# Patient Record
Sex: MALE | Race: OTHER | HISPANIC OR LATINO | URBAN - METROPOLITAN AREA
[De-identification: names, ages, dates, MRNs, and addresses within clinical notes are randomized per-mention and may not be internally consistent; named-entity substitution may affect disease eponyms.]

---

## 2019-05-22 ENCOUNTER — EMERGENCY (EMERGENCY)
Facility: HOSPITAL | Age: 49
LOS: 1 days | Discharge: HOME | End: 2019-05-22
Admitting: EMERGENCY MEDICINE
Payer: MEDICAID

## 2019-05-22 VITALS
WEIGHT: 223.11 LBS | OXYGEN SATURATION: 99 % | TEMPERATURE: 98 F | HEART RATE: 78 BPM | SYSTOLIC BLOOD PRESSURE: 109 MMHG | RESPIRATION RATE: 16 BRPM | DIASTOLIC BLOOD PRESSURE: 77 MMHG

## 2019-05-22 PROCEDURE — 99282 EMERGENCY DEPT VISIT SF MDM: CPT

## 2019-05-22 RX ORDER — BACITRACIN ZINC 500 UNIT/G
1 OINTMENT IN PACKET (EA) TOPICAL ONCE
Refills: 0 | Status: COMPLETED | OUTPATIENT
Start: 2019-05-22 | End: 2019-05-22

## 2019-05-22 RX ADMIN — Medication 1 APPLICATION(S): at 14:40

## 2019-05-22 NOTE — ED PROVIDER NOTE - NSFOLLOWUPINSTRUCTIONS_ED_ALL_ED_FT
please keep the wound care    please place bacitracin on the wound 2 times a day for the next week    please follow up with your primary care doctor    Acute Wounds    WHAT YOU NEED TO KNOW:    An acute wound is an injury that causes a break in the skin. As your wound begins to heal, it is normal to have some swelling, pain, and redness. Your body's immune system is working to keep your wound from getting infected. Your wound may develop a scab. The scab protects your wound as it heals.     DISCHARGE INSTRUCTIONS:    Call 911 for the following:     You suddenly have trouble breathing or have chest pain.        Return to the emergency department if:     Blood soaks through your bandage.      You have pus or a foul odor coming from the wound.      Your stitches come apart or your wound reopens.    Contact your healthcare provider if:     You continue to have pain even after you have taken pain medicine.      You have muscle, joint, or body aches, sweating, or a fever.      You have increased swelling, redness, or bleeding in your wound.      Your skin is itchy, swollen, or you have a rash.      You have questions or concerns about your condition or care.    Medicines: You may need any of the following:     Antibiotics may be given to prevent or treat an infection.       Prescription pain medicine may be given. Ask your how to take this medicine safely.      NSAIDs help decrease swelling and pain or fever. This medicine is available with or without a doctor's order. NSAIDs can cause stomach bleeding or kidney problems in certain people. If you take blood thinner medicine, always ask your healthcare provider if NSAIDs are safe for you. Always read the medicine label and follow directions.      Take your medicine as directed. Contact your healthcare provider if you think your medicine is not helping or if you have side effects. Tell him or her if you are allergic to any medicine. Keep a list of the medicines, vitamins, and herbs you take. Include the amounts, and when and why you take them. Bring the list or the pill bottles to follow-up visits. Carry your medicine list with you in case of an emergency.    Care for your wound as directed: Acute wounds can be in different locations and caused by different injuries. Follow your healthcare provider's instructions on caring for your type of wound. The following care items are for most wounds:    Keep your wound covered with a clean and dry bandage. Change your bandage if it becomes wet or dirty. This will decrease the risk for infection in your wound. Follow your healthcare provider's instructions for changing your dressing.       Do not soak in a tub or swim until your healthcare provider says it is okay. Your wound may open if you get it too wet. Dirt from the water can also get into your wound and cause an infection.      Keep pets away from your wound. Pets carry germs that can cause a wound infection.       Do not pick or scratch scabs. Let scabs fall off on their own. You may damage new skin that is forming under the scab. You may have a worse scar after the damage.      Eat healthy foods and drink liquids as directed. Healthy foods give your body the nutrients it needs to heal your wound. Liquids prevent dehydration that can decrease the blood supply to your wound. Healthy foods include fruits, vegetables, grains (breads and cereals), dairy, and protein foods. Protein foods include meat, fish, nuts, and soy products. Protein, calories, vitamin C, and zinc help wounds heal. Ask your healthcare provider for more information about the foods you should eat to improve healing.     Follow up with your healthcare provider as directed: Write down your questions so you remember to ask them during your visits.        © Copyright [email or web address] 2019 All illustrations and images included in CareNotes are the copyrighted property of [employer] or [employer].      back to top                      © Copyright [email or web address] 2019

## 2019-05-22 NOTE — ED PROVIDER NOTE - PHYSICAL EXAMINATION
General survey: Patient is well developed and well nourished. Patient is lying in stretcher, not diaphoretic and does not appear in acute distress.    Pulm: Breath sounds present throughout all lung fields. Chest expansion symmetric bilaterally. No evidence of wheezes, rales, rhonchi or retraction.    Skin: left distal tibia 2cm horizontal wound, no drainage. surrouding erythenam, no ttp. Warm dry and intact. No note of any edema, pallor, jaundice, erythema, ecchymosis, or purpura    Psych: Mood and affect appropriate

## 2019-05-22 NOTE — ED PROVIDER NOTE - OBJECTIVE STATEMENT
states that he has been scratching his left lower leg over the past 2 weeks. states that there is a small wound. no fevers or chills nausea. states there is pain in the area of the cut. states that he cut happened 10 days ago. unsure of how. states that he is concerned for infection given history of diabetes. no history of mrsa.

## 2019-05-22 NOTE — ED ADULT NURSE NOTE - OBJECTIVE STATEMENT
Pt coming from home due to "wound" to left shin. pt denies any drainage, or fever. 1.5 cm Healing lac with mild redness to area

## 2019-05-26 DIAGNOSIS — Z79.2 LONG TERM (CURRENT) USE OF ANTIBIOTICS: ICD-10-CM

## 2019-05-26 DIAGNOSIS — S81.802A UNSPECIFIED OPEN WOUND, LEFT LOWER LEG, INITIAL ENCOUNTER: ICD-10-CM
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
## 2019-05-26 DIAGNOSIS — Z79.899 OTHER LONG TERM (CURRENT) DRUG THERAPY: ICD-10-CM
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
## 2019-06-15 ENCOUNTER — EMERGENCY (EMERGENCY)
Facility: HOSPITAL | Age: 49
LOS: 1 days | Discharge: HOME | End: 2019-06-15
Attending: EMERGENCY MEDICINE | Admitting: EMERGENCY MEDICINE
Payer: MEDICAID

## 2019-06-15 VITALS
TEMPERATURE: 98 F | HEART RATE: 76 BPM | RESPIRATION RATE: 18 BRPM | DIASTOLIC BLOOD PRESSURE: 82 MMHG | SYSTOLIC BLOOD PRESSURE: 120 MMHG | OXYGEN SATURATION: 97 %

## 2019-06-15 VITALS
WEIGHT: 210.98 LBS | DIASTOLIC BLOOD PRESSURE: 87 MMHG | RESPIRATION RATE: 17 BRPM | HEART RATE: 78 BPM | SYSTOLIC BLOOD PRESSURE: 133 MMHG | TEMPERATURE: 98 F | OXYGEN SATURATION: 98 %

## 2019-06-15 PROBLEM — E78.5 HYPERLIPIDEMIA, UNSPECIFIED: Chronic | Status: ACTIVE | Noted: 2019-05-22

## 2019-06-15 PROBLEM — I10 ESSENTIAL (PRIMARY) HYPERTENSION: Chronic | Status: ACTIVE | Noted: 2019-05-22

## 2019-06-15 PROCEDURE — 99283 EMERGENCY DEPT VISIT LOW MDM: CPT

## 2019-06-15 RX ORDER — IBUPROFEN 200 MG
600 TABLET ORAL ONCE
Refills: 0 | Status: COMPLETED | OUTPATIENT
Start: 2019-06-15 | End: 2019-06-15

## 2019-06-15 RX ADMIN — Medication 600 MILLIGRAM(S): at 12:18

## 2019-06-15 NOTE — ED PROVIDER NOTE - CLINICAL SUMMARY MEDICAL DECISION MAKING FREE TEXT BOX
49 yo M with pmh of asthma, seizures and DM c/o L leg swelling x 1 month with darkening of this skin. Denies fever, chills, cp, sob, trauma. Pt seen in ED on 5/22 given bacitracin. +smoking. Denies recent travel. No hx of VTE. LLE +hyperpigmentation of skin around ankle and shin with scaling, no erythema, no warmth, nontender, no calf swelling, DP pulse 2+. Likely stasis dermatitis, low suspicion for DVT, however given second visit and smoker sono ordered. PT griffiths not want to wait, discussed risks and pt will f/u as an outpatient.

## 2019-06-15 NOTE — ED ADULT NURSE NOTE - OBJECTIVE STATEMENT
patient alert and oriented x 3 came c/o left lower leg pain with patient alert and oriented x 3 came c/o left lower leg pain with swelling for 1 mos, denies any injury , no numbness nor tingling sensation . Seen and examined by TATY Avalos , awaiting to go for ultrasound . Medicated for pain , will continue to monitor . patient alert and oriented x 3 came c/o left lower leg pain with swelling for 1 mos, denies any injury , no numbness nor tingling sensation . Seen and examined by TATY Avalos , awaiting to go for ultrasound . Medicated for pain , will continue to monitor .No son nor chest pain .

## 2019-06-15 NOTE — ED PROVIDER NOTE - NSFOLLOWUPINSTRUCTIONS_ED_ALL_ED_FT
Stasis Dermatitis    WHAT YOU NEED TO KNOW:    Stasis dermatitis is a condition that develops when blood pools in your lower legs. It is caused by poor blood flow back to your heart.    DISCHARGE INSTRUCTIONS:    Call 911 for any of the following:     You feel lightheaded, short of breath, and have chest pain.      You cough up blood.    Return to the emergency department if:     Your leg feels warm, tender, and painful. It may look swollen and red.        Contact your healthcare provider if:     You have a fever.      Your pain is not getting better, even with treatment.      You have new or worse open sores.      Your sores are draining pus.      Your movement is limited.      You have questions or concerns about your condition or care.    Medicines:     Medicines help improve blood flow from your legs to your heart and decrease swelling.      Take your medicine as directed. Contact your healthcare provider if you think your medicine is not helping or if you have side effects. Tell him of her if you are allergic to any medicine. Keep a list of the medicines, vitamins, and herbs you take. Include the amounts, and when and why you take them. Bring the list or the pill bottles to follow-up visits. Carry your medicine list with you in case of an emergency.    Manage your symptoms:     Wear pressure stockings. These tight elastic stockings put pressure on your legs. This improves blood flow and prevents blood from collecting in your legs.Pressure Stockings            Elevate your legs above the level of your heart for 30 minutes, 4 times a day. This will help decrease swelling and pain. Prop your legs on pillows or blankets to keep them elevated comfortably.      Apply unscented lotions or creams to help keep your skin moist and decrease itching.      Do not scratch your legs. Your skin can break open if you scratch. This can lead to sores or an infection.    Lifestyle changes:     Maintain a healthy weight. Ask your healthcare provider how much you should weigh. Ask him to help you create a weight loss plan if you are overweight. This will help improve your blood flow.      Eat a variety of healthy foods. Healthy foods include fruits, vegetables, whole-grain breads, low-fat dairy products, beans, lean meats, and fish. You may need to eat foods that are low in salt to help decrease swelling in your legs.      Exercise regularly. Ask your healthcare provider about the best exercise plan for you. Exercise improves the blood flow in your legs.    Follow up with your healthcare provider as directed: Write down your questions so you remember to ask them during your visits.

## 2019-06-15 NOTE — ED ADULT NURSE REASSESSMENT NOTE - NS ED NURSE REASSESS COMMENT FT1
Patient a/oX 3, no new pain or symptom complaint , vital signs stable, refused US, TATY Avalos spoke w/ patient and informed of risks of not getting US.  Discharged to home in stable condition.

## 2019-06-15 NOTE — ED PROVIDER NOTE - PHYSICAL EXAMINATION
CONSTITUTIONAL: Well-appearing; well-nourished; in no apparent distress.   HEAD: Normocephalic; atraumatic.   EYES: PERRL; EOM intact; conjunctiva and sclera clear  ENT: normal nose; no rhinorrhea; normal pharynx with no erythema or lesions.   NECK: Supple; non-tender; no LAD  CARDIOVASCULAR: Normal S1, S2; no murmurs, rubs, or gallops. Regular rate and rhythm.   RESPIRATORY: Breathing easily; breath sounds clear and equal bilaterally; no wheezes, rhonchi, or rales.  GI: Soft; non-distended; non-tender; no palpable organomegaly.   MSK: FROM at all extremities, normal tone   EXT: LLE +hyperpigmentation of skin around ankle and shin with scaling, no erythema, no warmth, nontender, no calf swelling, DP pulse 2+  SKIN: Normal for age and race; warm; dry; good turgor; no apparent lesions or rash.   NEURO: A & O x 3; face symmetric; grossly unremarkable.   PSYCHOLOGICAL: The patient’s mood and manner are appropriate.

## 2019-06-15 NOTE — ED ADULT NURSE REASSESSMENT NOTE - NS ED NURSE REASSESS COMMENT FT1
pt. refusing to go for sonogram , TATY Avalos made aware , spoke to the patient the risk of not going to sonogram for possible DVT , pt. still insisting to go home . Dr. [place] came and evaluate the patient .

## 2019-06-15 NOTE — ED ADULT NURSE NOTE - CHPI ED NUR SYMPTOMS NEG
no stiffness/no numbness/no difficulty bearing weight/no tingling/no fever/no bruising/no weakness/no abrasion/no deformity

## 2019-06-15 NOTE — ED PROVIDER NOTE - OBJECTIVE STATEMENT
47 yo M with pmh of asthma, seizures and DM c/o L leg swelling x 1 month with darkening of this skin. Denies fever, chills, cp, sob, trauma. Pt seen in ED on 5/22 given bacitracin. +smoking. Denies recent travel. No hx of VTE.

## 2019-06-15 NOTE — ED PROVIDER NOTE - CARE PROVIDER_API CALL
Mac Lee)  Surgery; Vascular Surgery  [address], [address] [place] [address]  Phone: [phone]  Fax: [phone]  Follow Up Time:

## 2019-06-15 NOTE — ED PROVIDER NOTE - ATTENDING CONTRIBUTION TO CARE
48M pmh DM, asthma, sz, DM p/w L leg swelling, darkening of skin. Offered US for r/o dvt, concern stasis dermatitis, pt seeknig discharge without further w/u. Has plan for outpt f/u. Not consistnet w/ cellulitis or sepsis. No bullae or e/o deep tissue infection.

## 2019-06-19 DIAGNOSIS — I87.2 VENOUS INSUFFICIENCY (CHRONIC) (PERIPHERAL): ICD-10-CM

## 2019-06-19 DIAGNOSIS — M79.89 OTHER SPECIFIED SOFT TISSUE DISORDERS: ICD-10-CM

## 2019-06-20 ENCOUNTER — EMERGENCY (EMERGENCY)
Facility: HOSPITAL | Age: 49
LOS: 1 days | Discharge: LEFT BEFORE DISCHARGE | End: 2019-06-20
Attending: EMERGENCY MEDICINE | Admitting: EMERGENCY MEDICINE
Payer: MEDICAID

## 2019-06-20 VITALS
SYSTOLIC BLOOD PRESSURE: 117 MMHG | OXYGEN SATURATION: 98 % | TEMPERATURE: 98 F | HEART RATE: 84 BPM | DIASTOLIC BLOOD PRESSURE: 78 MMHG | RESPIRATION RATE: 18 BRPM

## 2019-06-20 VITALS
SYSTOLIC BLOOD PRESSURE: 117 MMHG | HEART RATE: 68 BPM | RESPIRATION RATE: 16 BRPM | DIASTOLIC BLOOD PRESSURE: 83 MMHG | OXYGEN SATURATION: 99 %

## 2019-06-20 DIAGNOSIS — M79.89 OTHER SPECIFIED SOFT TISSUE DISORDERS: ICD-10-CM

## 2019-06-20 DIAGNOSIS — R07.89 OTHER CHEST PAIN: ICD-10-CM

## 2019-06-20 DIAGNOSIS — R06.02 SHORTNESS OF BREATH: ICD-10-CM

## 2019-06-20 LAB
ALBUMIN SERPL ELPH-MCNC: 4.4 G/DL — SIGNIFICANT CHANGE UP (ref 3.3–5)
ALP SERPL-CCNC: 78 U/L — SIGNIFICANT CHANGE UP (ref 40–120)
ALT FLD-CCNC: 19 U/L — SIGNIFICANT CHANGE UP (ref 10–45)
ANION GAP SERPL CALC-SCNC: 11 MMOL/L — SIGNIFICANT CHANGE UP (ref 5–17)
APPEARANCE UR: CLEAR — SIGNIFICANT CHANGE UP
AST SERPL-CCNC: 24 U/L — SIGNIFICANT CHANGE UP (ref 10–40)
BASOPHILS # BLD AUTO: 0.04 K/UL — SIGNIFICANT CHANGE UP (ref 0–0.2)
BASOPHILS NFR BLD AUTO: 0.4 % — SIGNIFICANT CHANGE UP (ref 0–2)
BILIRUB SERPL-MCNC: 0.3 MG/DL — SIGNIFICANT CHANGE UP (ref 0.2–1.2)
BILIRUB UR-MCNC: NEGATIVE — SIGNIFICANT CHANGE UP
BUN SERPL-MCNC: 11 MG/DL — SIGNIFICANT CHANGE UP (ref 7–23)
CALCIUM SERPL-MCNC: 9.8 MG/DL — SIGNIFICANT CHANGE UP (ref 8.4–10.5)
CHLORIDE SERPL-SCNC: 102 MMOL/L — SIGNIFICANT CHANGE UP (ref 96–108)
CO2 SERPL-SCNC: 29 MMOL/L — SIGNIFICANT CHANGE UP (ref 22–31)
COLOR SPEC: YELLOW — SIGNIFICANT CHANGE UP
CREAT SERPL-MCNC: 0.91 MG/DL — SIGNIFICANT CHANGE UP (ref 0.5–1.3)
D DIMER BLD IA.RAPID-MCNC: 311 NG/ML DDU — HIGH
DIFF PNL FLD: NEGATIVE — SIGNIFICANT CHANGE UP
EOSINOPHIL # BLD AUTO: 0.43 K/UL — SIGNIFICANT CHANGE UP (ref 0–0.5)
EOSINOPHIL NFR BLD AUTO: 4.4 % — SIGNIFICANT CHANGE UP (ref 0–6)
GLUCOSE SERPL-MCNC: 86 MG/DL — SIGNIFICANT CHANGE UP (ref 70–99)
GLUCOSE UR QL: NEGATIVE — SIGNIFICANT CHANGE UP
HCT VFR BLD CALC: 46.1 % — SIGNIFICANT CHANGE UP (ref 39–50)
HGB BLD-MCNC: 14.8 G/DL — SIGNIFICANT CHANGE UP (ref 13–17)
IMM GRANULOCYTES NFR BLD AUTO: 0.2 % — SIGNIFICANT CHANGE UP (ref 0–1.5)
KETONES UR-MCNC: NEGATIVE — SIGNIFICANT CHANGE UP
LEUKOCYTE ESTERASE UR-ACNC: NEGATIVE — SIGNIFICANT CHANGE UP
LYMPHOCYTES # BLD AUTO: 2.98 K/UL — SIGNIFICANT CHANGE UP (ref 1–3.3)
LYMPHOCYTES # BLD AUTO: 30.5 % — SIGNIFICANT CHANGE UP (ref 13–44)
MCHC RBC-ENTMCNC: 29 PG — SIGNIFICANT CHANGE UP (ref 27–34)
MCHC RBC-ENTMCNC: 32.1 GM/DL — SIGNIFICANT CHANGE UP (ref 32–36)
MCV RBC AUTO: 90.2 FL — SIGNIFICANT CHANGE UP (ref 80–100)
MONOCYTES # BLD AUTO: 0.72 K/UL — SIGNIFICANT CHANGE UP (ref 0–0.9)
MONOCYTES NFR BLD AUTO: 7.4 % — SIGNIFICANT CHANGE UP (ref 2–14)
NEUTROPHILS # BLD AUTO: 5.57 K/UL — SIGNIFICANT CHANGE UP (ref 1.8–7.4)
NEUTROPHILS NFR BLD AUTO: 57.1 % — SIGNIFICANT CHANGE UP (ref 43–77)
NITRITE UR-MCNC: NEGATIVE — SIGNIFICANT CHANGE UP
NRBC # BLD: 0 /100 WBCS — SIGNIFICANT CHANGE UP (ref 0–0)
PH UR: 6.5 — SIGNIFICANT CHANGE UP (ref 5–8)
PLATELET # BLD AUTO: 238 K/UL — SIGNIFICANT CHANGE UP (ref 150–400)
POTASSIUM SERPL-MCNC: 4.1 MMOL/L — SIGNIFICANT CHANGE UP (ref 3.5–5.3)
POTASSIUM SERPL-SCNC: 4.1 MMOL/L — SIGNIFICANT CHANGE UP (ref 3.5–5.3)
PROT SERPL-MCNC: 7.8 G/DL — SIGNIFICANT CHANGE UP (ref 6–8.3)
PROT UR-MCNC: NEGATIVE MG/DL — SIGNIFICANT CHANGE UP
RBC # BLD: 5.11 M/UL — SIGNIFICANT CHANGE UP (ref 4.2–5.8)
RBC # FLD: 14.4 % — SIGNIFICANT CHANGE UP (ref 10.3–14.5)
SODIUM SERPL-SCNC: 142 MMOL/L — SIGNIFICANT CHANGE UP (ref 135–145)
SP GR SPEC: <=1.005 — SIGNIFICANT CHANGE UP (ref 1–1.03)
TROPONIN T SERPL-MCNC: <0.01 NG/ML — SIGNIFICANT CHANGE UP (ref 0–0.01)
UROBILINOGEN FLD QL: 0.2 E.U./DL — SIGNIFICANT CHANGE UP
WBC # BLD: 9.76 K/UL — SIGNIFICANT CHANGE UP (ref 3.8–10.5)
WBC # FLD AUTO: 9.76 K/UL — SIGNIFICANT CHANGE UP (ref 3.8–10.5)

## 2019-06-20 PROCEDURE — 99285 EMERGENCY DEPT VISIT HI MDM: CPT

## 2019-06-20 PROCEDURE — 85379 FIBRIN DEGRADATION QUANT: CPT

## 2019-06-20 PROCEDURE — 71046 X-RAY EXAM CHEST 2 VIEWS: CPT

## 2019-06-20 PROCEDURE — 80053 COMPREHEN METABOLIC PANEL: CPT

## 2019-06-20 PROCEDURE — 36415 COLL VENOUS BLD VENIPUNCTURE: CPT

## 2019-06-20 PROCEDURE — 71046 X-RAY EXAM CHEST 2 VIEWS: CPT | Mod: 26

## 2019-06-20 PROCEDURE — 84484 ASSAY OF TROPONIN QUANT: CPT

## 2019-06-20 PROCEDURE — 99283 EMERGENCY DEPT VISIT LOW MDM: CPT | Mod: 25

## 2019-06-20 PROCEDURE — 81003 URINALYSIS AUTO W/O SCOPE: CPT

## 2019-06-20 PROCEDURE — 85025 COMPLETE CBC W/AUTO DIFF WBC: CPT

## 2019-06-20 NOTE — ED ADULT TRIAGE NOTE - CHIEF COMPLAINT QUOTE
CP and SOB x2 days, +smoker; also with LLE pain and swelling, was supposed to have US last visit but did not want to wait

## 2019-06-20 NOTE — ED PROVIDER NOTE - CLINICAL SUMMARY MEDICAL DECISION MAKING FREE TEXT BOX
Pt in ED w concern for chest pain, lower extremity swelling - ongoing.  Seen last week in ED and signed himself out prior to completion of work up.  Patient non toxic, no active CP on arrival to ED.  No appreciable swelling to lower extremities.  Labs and imaging ordered.  Patient unwilling to wait for return/completion of imaging studies and eloped from ED.  Patient is asked to sign AMA papers, however elopes prior to signing documents.  He is aware this will now be the second time he has been seen in an ER and left without completion of care/evaluation.  He is unwilling to stay in ED at leaves against medical advice.

## 2019-06-20 NOTE — ED ADULT NURSE NOTE - OBJECTIVE STATEMENT
Pt presents with complaints of intermittent epigastric chest pain and shortness of breath x4 days and left lower extremity swelling. Pt denies any fevers, no lightheadedness, no dizziness, no headache, no cp at this time, no sob at this time, no n/v, no changes to bowels, no urinary complaints.

## 2019-06-20 NOTE — ED PROVIDER NOTE - OBJECTIVE STATEMENT
48 year old male with history of HLD, HTN, Schizophrenia and seizures (on keppra with last seizure 4 months ago) presents to ED with concern for chest pain, and swelling to bilateral LEs, left greater than right.  Patient states the chest discomfort is ongoing, and notes he has been previously evaluated for same symptoms.  He believes swelling to legs is new, and states he came to the ED several days ago, however signed out against medical advice prior to completion of US imaging.  Patient denies chest pain currently, shortness of breath, fever, chills, cough, headache, visual changes, abdominal pain, nausea, emesis, rashes, or any additional acute complaints or concerns at this time.

## 2019-06-20 NOTE — ED PROVIDER NOTE - PSYCHIATRIC [+], MLM
Pt states Margarita Williamson requested wrong medication. She is out of this medication and needs this today. Pt is upset with Margarita Williamson and not us. Can she get today? + history of schizophrenia

## 2019-06-20 NOTE — ED PROVIDER NOTE - MUSCULOSKELETAL, MLM
Spine appears normal, range of motion is not limited, no muscle or joint tenderness.  there is no edema present to bilateral LEs.

## 2019-06-20 NOTE — ED ADULT NURSE NOTE - CHPI ED NUR SYMPTOMS NEG
no fever/no dizziness/no nausea/no weakness/no vomiting/no tingling/no chills/no decreased eating/drinking

## 2019-06-25 PROBLEM — Z00.00 ENCOUNTER FOR PREVENTIVE HEALTH EXAMINATION: Status: ACTIVE | Noted: 2019-06-25

## 2019-06-28 ENCOUNTER — APPOINTMENT (OUTPATIENT)
Dept: VASCULAR SURGERY | Facility: CLINIC | Age: 49
End: 2019-06-28
Payer: MEDICAID

## 2019-06-28 PROCEDURE — 99203 OFFICE O/P NEW LOW 30 MIN: CPT

## 2019-07-01 NOTE — PHYSICAL EXAM
[JVD] : no jugular venous distention  [2+] : left 2+ [Ankle Swelling (On Exam)] : present [Ankle Swelling Bilaterally] : bilaterally  [Ankle Swelling On The Right] : mild [Varicose Veins Of Lower Extremities] : not present [] : not present [de-identified] : quite

## 2019-07-01 NOTE — HISTORY OF PRESENT ILLNESS
[FreeTextEntry1] : pt comes in for evaluation of a bug bite on the left leg and discoloration of the leg\par no hs of dvt or svt\par no claudication \par no hx of ulcers\par \par pt states that he killed a centipede in his house and sometimes he sees other bugs\par

## 2021-09-02 NOTE — ED PROVIDER NOTE - NS ED NOTE AC HIGH RISK COUNTRIES
Lab Results   Component Value Date    EGFR 38 09/05/2021    EGFR 31 09/04/2021    EGFR 41 09/03/2021    CREATININE 1 33 (H) 09/05/2021    CREATININE 1 56 (H) 09/04/2021    CREATININE 1 24 09/03/2021     · Cotinue with home medications  · Avoid Nephrotoxic drugs  · Hydrate patient with 100cc/hr of 1/2 NS  No

## 2023-03-21 NOTE — ED ADULT NURSE NOTE - NSIMPLEMENTINTERV_GEN_ALL_ED
Patient has upcoming ureteroscopy on 3/24/2023. Patient complains of pain rating 9/10 and seeing very small blood clots 2 hours to 45 mins ago. Patient's pain is now a 1-2/10. Patient did take a tramadol as he did not want to take a narcotic while watching his grandson. Patient does have NORCO to take if needed for tonight. No fevers/chills. Patient did have nausea that came with his pain, but denies vomiting. Patient states he has been straining his urine religiously. MD to advise further if patient needs repeat imaging prior to surgery. Patient advised to stay well hydrated and continue to strain urine. Patient aware he will need to go to the ER with any inability to urinate, fever/chills, nausea with vomiting, and uncontrollable pain. Patient agrees with plan and will wait for call back regarding MD's recommendations.      Implemented All Universal Safety Interventions:  [place] to call system. Call bell, personal items and telephone within reach. Instruct patient to call for assistance. Room bathroom lighting operational. Non-slip footwear when patient is off stretcher. Physically safe environment: no spills, clutter or unnecessary equipment. Stretcher in lowest position, wheels locked, appropriate side rails in place.

## 2023-08-09 ENCOUNTER — INPATIENT (INPATIENT)
Facility: HOSPITAL | Age: 53
LOS: 1 days | Discharge: PSYCHIATRIC OR SUBSTANCE USE FACILITY | DRG: 311 | End: 2023-08-11
Attending: INTERNAL MEDICINE | Admitting: INTERNAL MEDICINE
Payer: MEDICAID

## 2023-08-09 VITALS
HEART RATE: 89 BPM | OXYGEN SATURATION: 98 % | TEMPERATURE: 99 F | HEIGHT: 66 IN | SYSTOLIC BLOOD PRESSURE: 128 MMHG | WEIGHT: 175.05 LBS | RESPIRATION RATE: 17 BRPM | DIASTOLIC BLOOD PRESSURE: 74 MMHG

## 2023-08-09 LAB
ANION GAP SERPL CALC-SCNC: 7 MMOL/L — SIGNIFICANT CHANGE UP (ref 5–17)
BASOPHILS # BLD AUTO: 0.03 K/UL — SIGNIFICANT CHANGE UP (ref 0–0.2)
BASOPHILS NFR BLD AUTO: 0.3 % — SIGNIFICANT CHANGE UP (ref 0–2)
BUN SERPL-MCNC: 18 MG/DL — SIGNIFICANT CHANGE UP (ref 7–23)
CALCIUM SERPL-MCNC: 9.7 MG/DL — SIGNIFICANT CHANGE UP (ref 8.4–10.5)
CHLORIDE SERPL-SCNC: 97 MMOL/L — SIGNIFICANT CHANGE UP (ref 96–108)
CK MB CFR SERPL CALC: 3.2 NG/ML — SIGNIFICANT CHANGE UP (ref 0–6.7)
CK SERPL-CCNC: 357 U/L — HIGH (ref 30–200)
CO2 SERPL-SCNC: 29 MMOL/L — SIGNIFICANT CHANGE UP (ref 22–31)
CREAT SERPL-MCNC: 0.86 MG/DL — SIGNIFICANT CHANGE UP (ref 0.5–1.3)
EGFR: 103 ML/MIN/1.73M2 — SIGNIFICANT CHANGE UP
EOSINOPHIL # BLD AUTO: 0.13 K/UL — SIGNIFICANT CHANGE UP (ref 0–0.5)
EOSINOPHIL NFR BLD AUTO: 1.2 % — SIGNIFICANT CHANGE UP (ref 0–6)
GLUCOSE SERPL-MCNC: 106 MG/DL — HIGH (ref 70–99)
HCT VFR BLD CALC: 32.1 % — LOW (ref 39–50)
HGB BLD-MCNC: 11.2 G/DL — LOW (ref 13–17)
IMM GRANULOCYTES NFR BLD AUTO: 0.2 % — SIGNIFICANT CHANGE UP (ref 0–0.9)
LIDOCAIN IGE QN: 32 U/L — SIGNIFICANT CHANGE UP (ref 7–60)
LYMPHOCYTES # BLD AUTO: 2.3 K/UL — SIGNIFICANT CHANGE UP (ref 1–3.3)
LYMPHOCYTES # BLD AUTO: 22 % — SIGNIFICANT CHANGE UP (ref 13–44)
MCHC RBC-ENTMCNC: 30.6 PG — SIGNIFICANT CHANGE UP (ref 27–34)
MCHC RBC-ENTMCNC: 34.9 GM/DL — SIGNIFICANT CHANGE UP (ref 32–36)
MCV RBC AUTO: 87.7 FL — SIGNIFICANT CHANGE UP (ref 80–100)
MONOCYTES # BLD AUTO: 0.8 K/UL — SIGNIFICANT CHANGE UP (ref 0–0.9)
MONOCYTES NFR BLD AUTO: 7.7 % — SIGNIFICANT CHANGE UP (ref 2–14)
NEUTROPHILS # BLD AUTO: 7.17 K/UL — SIGNIFICANT CHANGE UP (ref 1.8–7.4)
NEUTROPHILS NFR BLD AUTO: 68.6 % — SIGNIFICANT CHANGE UP (ref 43–77)
NRBC # BLD: 0 /100 WBCS — SIGNIFICANT CHANGE UP (ref 0–0)
NT-PROBNP SERPL-SCNC: 413 PG/ML — HIGH (ref 0–300)
PLATELET # BLD AUTO: 206 K/UL — SIGNIFICANT CHANGE UP (ref 150–400)
POTASSIUM SERPL-MCNC: 3.6 MMOL/L — SIGNIFICANT CHANGE UP (ref 3.5–5.3)
POTASSIUM SERPL-SCNC: 3.6 MMOL/L — SIGNIFICANT CHANGE UP (ref 3.5–5.3)
RBC # BLD: 3.66 M/UL — LOW (ref 4.2–5.8)
RBC # FLD: 13.4 % — SIGNIFICANT CHANGE UP (ref 10.3–14.5)
SODIUM SERPL-SCNC: 133 MMOL/L — LOW (ref 135–145)
TROPONIN T, HIGH SENSITIVITY RESULT: 11 NG/L — SIGNIFICANT CHANGE UP (ref 0–51)
TROPONIN T, HIGH SENSITIVITY RESULT: 11 NG/L — SIGNIFICANT CHANGE UP (ref 0–51)
WBC # BLD: 10.45 K/UL — SIGNIFICANT CHANGE UP (ref 3.8–10.5)
WBC # FLD AUTO: 10.45 K/UL — SIGNIFICANT CHANGE UP (ref 3.8–10.5)

## 2023-08-09 PROCEDURE — 99285 EMERGENCY DEPT VISIT HI MDM: CPT

## 2023-08-09 PROCEDURE — 71045 X-RAY EXAM CHEST 1 VIEW: CPT | Mod: 26

## 2023-08-09 RX ORDER — DEXTROSE 50 % IN WATER 50 %
25 SYRINGE (ML) INTRAVENOUS ONCE
Refills: 0 | Status: DISCONTINUED | OUTPATIENT
Start: 2023-08-09 | End: 2023-08-11

## 2023-08-09 RX ORDER — NITROGLYCERIN 6.5 MG
0.4 CAPSULE, EXTENDED RELEASE ORAL
Refills: 0 | Status: DISCONTINUED | OUTPATIENT
Start: 2023-08-09 | End: 2023-08-11

## 2023-08-09 RX ORDER — INSULIN LISPRO 100/ML
VIAL (ML) SUBCUTANEOUS
Refills: 0 | Status: DISCONTINUED | OUTPATIENT
Start: 2023-08-09 | End: 2023-08-11

## 2023-08-09 RX ORDER — ASPIRIN/CALCIUM CARB/MAGNESIUM 324 MG
162 TABLET ORAL DAILY
Refills: 0 | Status: DISCONTINUED | OUTPATIENT
Start: 2023-08-09 | End: 2023-08-10

## 2023-08-09 RX ORDER — GLUCAGON INJECTION, SOLUTION 0.5 MG/.1ML
1 INJECTION, SOLUTION SUBCUTANEOUS ONCE
Refills: 0 | Status: DISCONTINUED | OUTPATIENT
Start: 2023-08-09 | End: 2023-08-11

## 2023-08-09 RX ORDER — NITROGLYCERIN 6.5 MG
0.4 CAPSULE, EXTENDED RELEASE ORAL
Refills: 0 | Status: DISCONTINUED | OUTPATIENT
Start: 2023-08-09 | End: 2023-08-09

## 2023-08-09 RX ORDER — DEXTROSE 50 % IN WATER 50 %
12.5 SYRINGE (ML) INTRAVENOUS ONCE
Refills: 0 | Status: DISCONTINUED | OUTPATIENT
Start: 2023-08-09 | End: 2023-08-11

## 2023-08-09 RX ORDER — SODIUM CHLORIDE 9 MG/ML
1000 INJECTION, SOLUTION INTRAVENOUS
Refills: 0 | Status: DISCONTINUED | OUTPATIENT
Start: 2023-08-09 | End: 2023-08-11

## 2023-08-09 RX ORDER — DEXTROSE 50 % IN WATER 50 %
15 SYRINGE (ML) INTRAVENOUS ONCE
Refills: 0 | Status: DISCONTINUED | OUTPATIENT
Start: 2023-08-09 | End: 2023-08-11

## 2023-08-09 RX ADMIN — Medication 0.4 MILLIGRAM(S): at 22:43

## 2023-08-09 RX ADMIN — Medication 30 MILLILITER(S): at 21:02

## 2023-08-09 RX ADMIN — Medication 162 MILLIGRAM(S): at 21:02

## 2023-08-09 NOTE — H&P ADULT - PROBLEM SELECTOR PLAN 1
Class lll Angina  -Telemetry, ECG, serial CE   Troponin T Sensitivity neg X1   -non compliance with medication; does not take Atorvastatin 40mg daily (was prescribed in past)  -Aspirin 324mg PO X1 and ASA Ec 81mg PO daily  -NPO for further cardiac workup  -TTE in AM

## 2023-08-09 NOTE — H&P ADULT - NSICDXPASTMEDICALHX_GEN_ALL_CORE_FT
PAST MEDICAL HISTORY:  CAD (coronary artery disease)     DM2 (diabetes mellitus, type 2)     Dyslipidemia     HTN (hypertension)     Schizophrenia     Seizures

## 2023-08-09 NOTE — ED ADULT NURSE NOTE - OBJECTIVE STATEMENT
Pt presents to ED c/o chest pain "all over" since yesterday.   PMH HTN, DM and seizures. Denies sob,. abd pain, fever, chills, n/v/d, HA, dizziness. EKG complete. Pt presents to ED c/o chest pain "all over" and abdominal pain since yesterday. Pt takes Cardizem and aspirin daily. SOB on exertion. Last BM 2 days ago.  PMH HTN, angina, Afib, DM and seizures. Denies any radiation down extremities, fever, chills, n/v/d, HA, dizziness. EKG complete, ccm initiated, large bore IV access obtained and labs sent.

## 2023-08-09 NOTE — ED ADULT NURSE NOTE - CHIEF COMPLAINT QUOTE
Pt presents to ED c/o chest pain over a day. Pt has hx of HTN, DM and [id number]. Denies sob,. abd pain, fever, chills, n/v/d, HA, dizziness. EKG in prog.

## 2023-08-09 NOTE — H&P ADULT - NSHPSOCIALHISTORY_GEN_ALL_CORE
Single, lives alone, current smoker, 1ppd X 25yrs, former opioid abuse on Methadone 60mg daily [facility] [phone].denies EtOH use

## 2023-08-09 NOTE — ED PROVIDER NOTE - OBJECTIVE STATEMENT
51 yo M w PMH of CAD s/p stents, DM, HTN, seizure disorder, afib on ASA, p/w chest pain since last night. Pain started when he was walking in the park, has persisted since then at rest. He describes it as substernal, non-radiating, similar to prior CP but more intense. No SOB, n/v, abd pain, or lightheadedness. He sometimes takes nitro for angina, however did not take any today. No leg swelling, cough, hemoptysis, exogenous estrogen, recent travel, surgery, malignancy, personal or FHx of VTE.

## 2023-08-09 NOTE — H&P ADULT - NSHPOUTPATIENTPROVIDERS_GEN_ALL_CORE
Pharmacy [facility] [phone]  Dr. Eloy Rosen [facility]   [facility] [place] [address] [phone] ext [phone]

## 2023-08-09 NOTE — H&P ADULT - HISTORY OF PRESENT ILLNESS
A&O X3 Full Code    53 yo Male  w/PMHx of CAD s/p stents, DM2, HTN, seizure disorder, afib on ASA presents to [facility] ER this evening, 8/9/23, complaining of  chest pain X1 day.      In speaking with patient, he reports he was walking in the park when suddenly he began to experience intermittent, non-radiating substernal chest pressure, rated      lasting  with no associated symptoms.   A&O X3 Full Code    51 y/o male, current smoker (1ppd X25 yrs ) POOR HISTORIAN 2/2 to Schizophrenia   w/PMHx of Schizophrenia, hx of opioid abuse on Methadone 60mg daily ([facility])  CAD s/p stents, DM2, HTN, seizure disorder (pt. reports no longer taking Keppra, not listed from prior hx of medication, will need to confirm w/ pharmacy), afib on ASA presents to [facility] ER this evening, 8/9/23, complaining of  intermittent, exertional, non radiating,  substernal chest pain  X1 day.      In speaking with patient, he reports he was walking in the park, three to four blocks,  when suddenly he began to experience intermittent,   non-radiating substernal chest pain, described as "sharp", rated 6/10 lasting 5minuts  with no associated symptoms.  Patient also endorses b/l LE pain with ambulating 3-4 blocks with relief at rest, he denies numbness, temperature changes.    Pt. states he has not taken his medication for over a week and does not recall names or dosage of medication expect some of his Psych medication and Methadone 60mg daily ([facility] [phone]).  Pt. has poor medical follow up, he reports visiting several hospitals this week and signs out AMA. Patient denies fever, chills, HA, current chest pain, palpitations, SOB, dizziness, diaphoresis, abdominal discomfort and n/v/d.    In ER ECG reveals NSR@84bpm with non specific ST-T wave changes, Troponin T Sensitivity (11) negative X1, , CKMB 3.2,   , H/H 11.2/32.1 Plts 206, Na 133, BUN/Cr 18/0.86, Blood Glucose 166.  VSS Temp 97.6F, /72; HR 80, RR 18  O2 RA 97%       A&O X3 Full Code    51 y/o male, current smoker (1ppd X25 yrs ) POOR HISTORIAN 2/2 to Schizophrenia   w/PMHx of Schizophrenia, hx of opioid abuse on Methadone 60mg daily ([facility])  CAD s/p stents (does not recall when), DM2, HTN, seizure disorder (pt. reports no longer taking Keppra, not listed from prior hx of medication, will need to confirm w/ pharmacy), afib on ASA presents to [facility] ER this evening, 8/9/23, complaining of  intermittent, exertional, non radiating,  substernal chest pain  X1 day.      In speaking with patient, he reports he was walking in the park, three to four blocks,  when suddenly he began to experience intermittent,   non-radiating substernal chest pain, described as "sharp", rated 6/10 lasting 5minuts  with no associated symptoms.  Patient also endorses b/l LE pain with ambulating 3-4 blocks with relief at rest, he denies numbness, temperature changes.    Pt. states he has not taken his medication for over a week and does not recall names or dosage of medication expect some of his Psych medication and Methadone 60mg daily ([facility] [phone]).  Pt. has poor medical follow up, he reports visiting several hospitals this week and signs out AMA. Patient denies fever, chills, HA, current chest pain, palpitations, SOB, dizziness, diaphoresis, abdominal discomfort and n/v/d.    In ER ECG reveals NSR@84bpm with non specific ST-T wave changes, Troponin T Sensitivity (11) negative X1, , CKMB 3.2,   , H/H 11.2/32.1 Plts 206, Na 133, BUN/Cr 18/0.86, Blood Glucose 166.  VSS Temp 97.6F, /72; HR 80, RR 18  O2 RA 97%  Pt. received ASA 162mg X1 Maalox 30mgPO X 1 in ER.    In light of patient's risk factors and Class lll Angina symptoms, pt. is admitted to [facility], Cardiology for further cardiac workup to rule out ACS.

## 2023-08-09 NOTE — ED PROVIDER NOTE - CLINICAL SUMMARY MEDICAL DECISION MAKING FREE TEXT BOX
Concerned for ACS, given concerning history and physical and increased risk factors. ECG shows NHUNG in V1. Low suspicion for acute PE ([facility] low risk), PTX, aortic dissection, cardiac effusion/tamponade. Will most likely admit for inpatient risk stratification and possible stress testing with Cardiology.    Plan: Cardiac monitor, EKG, trop, CXR, ASA, pain control, reassess, Cardiology admit for unstable angina

## 2023-08-09 NOTE — H&P ADULT - PROBLEM SELECTOR PLAN 4
F/U Lipid panel and LFT's  -Started patient on Atorvastatin 40mg PO daily (was on in past)     Call patient's pharmacy for medication list

## 2023-08-09 NOTE — H&P ADULT - NSHPLABSRESULTS_GEN_ALL_CORE
11.2   10.45 )-----------( 206      ( 09 Aug 2023 20:33 )             32.1       08-09    133<L>  |  97  |  18  ----------------------------<  106<H>  3.6   |  29  |  0.86    Ca    9.7      09 Aug 2023 20:33            CARDIAC MARKERS ( 09 Aug 2023 20:33 )  x     / x     / 357 U/L / x     / 3.2 ng/mL        Urinalysis Basic - ( 09 Aug 2023 20:33 )    Color: x / Appearance: x / SG: x / pH: x  Gluc: 106 mg/dL / Ketone: x  / Bili: x / Urobili: x   Blood: x / Protein: x / Nitrite: x   Leuk Esterase: x / RBC: x / WBC x   Sq Epi: x / Non Sq Epi: x / Bacteria: x        EKG:NSR@84bpm with non specific ST-T wave changes

## 2023-08-09 NOTE — H&P ADULT - ASSESSMENT
53 y/o male, current smoker (1ppd X25 yrs ) POOR HISTORIAN 2/2 to Schizophrenia   w/PMHx of Schizophrenia, hx of opioid abuse on [facility]adone [facility]mg daily ([facility])  CAD s/p stents (does not recall when), DM2, HTN, seizure disorder (pt. reports no longer taking Keppra, not listed from prior hx of medication, will need to confirm w/ pharmacy), afib on ASA presents to [facility] ER this evening, 8/9/23, complaining of  intermittent, exertional, non radiating,  substernal chest pain  X1 day.

## 2023-08-09 NOTE — H&P ADULT - NSHPREVIEWOFSYSTEMS_GEN_ALL_CORE
GENERAL, CONSTITUTIONAL : denies recent weight loss, fever, chills  EYES, VISION: denies changes in vision   EARS, NOSE, THROAT: denies hearing loss  HEART, CARDIOVASCULAR: admtis to  chest pain, arrhythmia, palpitations,  RESPIRATORY: Denies cough, SOB, wheezing, PND, orthopnea  GASTROINTESTINAL: Denies abdominal pain, heartburn, bloody stool, dark tarry stool  GENITOURINARY: Denies frequent urination, urgency  MUSCULOSKELETAL denies joint pain or swelling, restricted motion, musculoskeletal pain.   SKIN & INTEGUMENTARY Denies rashes, sores, blisters, blisters, growths.  NEUROLOGICAL: Denies numbness or tingling sensations, sensation loss, burning.   PSYCHIATRIC: Denies nervousness, anxiety, depression  ENDOCRINE Denies heat or cold intolerance, excessive thirst  HEMATOLOGIC/LYMPHATIC: Denies abnormal bleeding, bleeding of any kind GENERAL, CONSTITUTIONAL : denies recent weight loss, fever, chills  EYES, VISION: denies changes in vision   EARS, NOSE, THROAT: denies hearing loss  HEART, CARDIOVASCULAR: admtis to  chest pain, arrhythmia, palpitations,  RESPIRATORY: Denies cough, SOB, wheezing, PND, orthopnea  GASTROINTESTINAL: Denies abdominal pain, heartburn, bloody stool, dark tarry stool  GENITOURINARY: Denies frequent urination, urgency  MUSCULOSKELETAL  admits to  b/l leg pain with ambulation denies  joint pain or swelling, restricted motion, musculoskeletal pain.   SKIN & INTEGUMENTARY Denies rashes, sores, blisters, blisters, growths.  NEUROLOGICAL: Denies numbness or tingling sensations, sensation loss, burning.   PSYCHIATRIC: Denies nervousness, anxiety, depression  ENDOCRINE Denies heat or cold intolerance, excessive thirst  HEMATOLOGIC/LYMPHATIC: Denies abnormal bleeding, bleeding of any kind

## 2023-08-09 NOTE — ED ADULT TRIAGE NOTE - CHIEF COMPLAINT QUOTE
Pt presents to ED c/o chest pain over a day. Pt has hx of HTN, DM and [id number]. Denies sob,. abd pain, fever, chills, n/v/d, HA, dizziness. EKG in prog.

## 2023-08-09 NOTE — H&P ADULT - NSHPPHYSICALEXAM_GEN_ALL_CORE
T(C): 37.1 (08-09-23 @ 19:56), Max: 37.1 (08-09-23 @ 19:56)  HR: 87 (08-09-23 @ 22:46) (87 - 89)  BP: 117/65 (08-09-23 @ 22:46) (117/65 - 128/74)  RR: 17 (08-09-23 @ 22:46) (17 - 17)  SpO2: 100% (08-09-23 @ 22:46) (98% - 100%)  Wt(kg): --    Appearance: NAD  HEENT:   Normal oral mucosa,  EOMI	  Neck: Supple,  - JVD; No Carotid Bruit and 2+ pulses B/L  Cardiovascular: Normal S1 S2, No JVD, No murmurs  Respiratory: Lungs clear to auscultation//No Rales, Rhonchi, Wheezing	  Gastrointestinal:  Soft, Non-tender, + BS	  Skin: No rashes, No ecchymoses, No cyanosis  Extremities: Normal range of motion, No clubbing, cyanosis or edema  Vascular: Femoral pulses 2+ b/l without bruit, DP 1+ b/l, PT 1+ b/l  Neurologic: Non-focal  Psychiatry: A & O x 3, Mood & affect appropriate T(C): 37.1 (08-09-23 @ 19:56), Max: 37.1 (08-09-23 @ 19:56)  HR: 87 (08-09-23 @ 22:46) (87 - 89)  BP: 117/65 (08-09-23 @ 22:46) (117/65 - 128/74)  RR: 17 (08-09-23 @ 22:46) (17 - 17)  SpO2: 100% (08-09-23 @ 22:46) (98% - 100%)  Wt(kg): --    Appearance: NAD  HEENT:   Normal oral mucosa,  EOMI	  Neck: Supple,  - JVD; No Carotid Bruit and 2+ pulses B/L  Cardiovascular: Normal S1 S2, No JVD, No murmurs  Respiratory: Lungs clear to auscultation//No Rales, Rhonchi, Wheezing	  Gastrointestinal:  Soft, Non-tender, + BS	  Skin: No rashes, No ecchymoses, No cyanosis  Extremities: Normal range of motion, No clubbing, cyanosis or edema  Vascular: Femoral pulses 2+ b/l without bruit, DP 2+ b/l, PT 1+ b/l  Neurologic: Non-focal  Psychiatry: A & O x 3, Mood & affect appropriate

## 2023-08-09 NOTE — H&P ADULT - PROBLEM SELECTOR PLAN 5
Monitor FS; F/U Hgb A1c  Insulin Lispro Med dose sliding scale  (past hx on Metformin 500mg daily)    Confirm medication list

## 2023-08-09 NOTE — ED ADULT NURSE NOTE - NSFALLUNIVINTERV_ED_ALL_ED
Bed/Stretcher in lowest position, wheels locked, appropriate side rails in place/Call bell, personal items and telephone in reach/Instruct patient to call for assistance before getting out of bed/chair/stretcher/Non-slip footwear applied when patient is off stretcher/[place] to call system/Physically safe environment - no spills, clutter or unnecessary equipment/Purposeful proactive rounding/Room/bathroom lighting operational, light cord in reach

## 2023-08-10 ENCOUNTER — TRANSCRIPTION ENCOUNTER (OUTPATIENT)
Age: 53
End: 2023-08-10

## 2023-08-10 DIAGNOSIS — I10 ESSENTIAL (PRIMARY) HYPERTENSION: ICD-10-CM

## 2023-08-10 DIAGNOSIS — E78.5 HYPERLIPIDEMIA, UNSPECIFIED: ICD-10-CM

## 2023-08-10 DIAGNOSIS — F11.20 OPIOID DEPENDENCE, UNCOMPLICATED: ICD-10-CM

## 2023-08-10 DIAGNOSIS — I48.0 PAROXYSMAL ATRIAL FIBRILLATION: ICD-10-CM

## 2023-08-10 DIAGNOSIS — Z78.9 OTHER SPECIFIED HEALTH STATUS: ICD-10-CM

## 2023-08-10 DIAGNOSIS — I20.9 ANGINA PECTORIS, UNSPECIFIED: ICD-10-CM

## 2023-08-10 DIAGNOSIS — G40.909 EPILEPSY, UNSPECIFIED, NOT INTRACTABLE, WITHOUT STATUS EPILEPTICUS: ICD-10-CM

## 2023-08-10 DIAGNOSIS — E11.9 TYPE 2 DIABETES MELLITUS WITHOUT COMPLICATIONS: ICD-10-CM

## 2023-08-10 DIAGNOSIS — I25.10 ATHEROSCLEROTIC HEART DISEASE OF NATIVE CORONARY ARTERY WITHOUT ANGINA PECTORIS: ICD-10-CM

## 2023-08-10 DIAGNOSIS — F20.9 SCHIZOPHRENIA, UNSPECIFIED: ICD-10-CM

## 2023-08-10 LAB
A1C WITH ESTIMATED AVERAGE GLUCOSE RESULT: 5.4 % — SIGNIFICANT CHANGE UP (ref 4–5.6)
ALBUMIN SERPL ELPH-MCNC: 3.6 G/DL — SIGNIFICANT CHANGE UP (ref 3.3–5)
ALP SERPL-CCNC: 74 U/L — SIGNIFICANT CHANGE UP (ref 40–120)
ALT FLD-CCNC: 16 U/L — SIGNIFICANT CHANGE UP (ref 10–45)
ANION GAP SERPL CALC-SCNC: 6 MMOL/L — SIGNIFICANT CHANGE UP (ref 5–17)
APTT BLD: 27.7 SEC — SIGNIFICANT CHANGE UP (ref 24.5–35.6)
AST SERPL-CCNC: 20 U/L — SIGNIFICANT CHANGE UP (ref 10–40)
BASOPHILS # BLD AUTO: 0.03 K/UL — SIGNIFICANT CHANGE UP (ref 0–0.2)
BASOPHILS NFR BLD AUTO: 0.5 % — SIGNIFICANT CHANGE UP (ref 0–2)
BILIRUB DIRECT SERPL-MCNC: 0.2 MG/DL — SIGNIFICANT CHANGE UP (ref 0–0.3)
BILIRUB INDIRECT FLD-MCNC: 0.2 MG/DL — SIGNIFICANT CHANGE UP (ref 0.2–1)
BILIRUB SERPL-MCNC: 0.4 MG/DL — SIGNIFICANT CHANGE UP (ref 0.2–1.2)
BUN SERPL-MCNC: 15 MG/DL — SIGNIFICANT CHANGE UP (ref 7–23)
CALCIUM SERPL-MCNC: 8.4 MG/DL — SIGNIFICANT CHANGE UP (ref 8.4–10.5)
CHLORIDE SERPL-SCNC: 98 MMOL/L — SIGNIFICANT CHANGE UP (ref 96–108)
CHOLEST SERPL-MCNC: 110 MG/DL — SIGNIFICANT CHANGE UP
CK MB CFR SERPL CALC: 2.2 NG/ML — SIGNIFICANT CHANGE UP (ref 0–6.7)
CK SERPL-CCNC: 214 U/L — HIGH (ref 30–200)
CO2 SERPL-SCNC: 31 MMOL/L — SIGNIFICANT CHANGE UP (ref 22–31)
CREAT SERPL-MCNC: 0.78 MG/DL — SIGNIFICANT CHANGE UP (ref 0.5–1.3)
EGFR: 106 ML/MIN/1.73M2 — SIGNIFICANT CHANGE UP
EOSINOPHIL # BLD AUTO: 0.15 K/UL — SIGNIFICANT CHANGE UP (ref 0–0.5)
EOSINOPHIL NFR BLD AUTO: 2.3 % — SIGNIFICANT CHANGE UP (ref 0–6)
ESTIMATED AVERAGE GLUCOSE: 108 MG/DL — SIGNIFICANT CHANGE UP (ref 68–114)
GLUCOSE BLDC GLUCOMTR-MCNC: 107 MG/DL — HIGH (ref 70–99)
GLUCOSE BLDC GLUCOMTR-MCNC: 113 MG/DL — HIGH (ref 70–99)
GLUCOSE BLDC GLUCOMTR-MCNC: 119 MG/DL — HIGH (ref 70–99)
GLUCOSE BLDC GLUCOMTR-MCNC: 95 MG/DL — SIGNIFICANT CHANGE UP (ref 70–99)
GLUCOSE SERPL-MCNC: 90 MG/DL — SIGNIFICANT CHANGE UP (ref 70–99)
HCT VFR BLD CALC: 32.2 % — LOW (ref 39–50)
HDLC SERPL-MCNC: 49 MG/DL — SIGNIFICANT CHANGE UP
HGB BLD-MCNC: 10.7 G/DL — LOW (ref 13–17)
IMM GRANULOCYTES NFR BLD AUTO: 0.2 % — SIGNIFICANT CHANGE UP (ref 0–0.9)
INR BLD: 0.99 — SIGNIFICANT CHANGE UP (ref 0.85–1.18)
LIPID PNL WITH DIRECT LDL SERPL: 48 MG/DL — SIGNIFICANT CHANGE UP
LYMPHOCYTES # BLD AUTO: 1.93 K/UL — SIGNIFICANT CHANGE UP (ref 1–3.3)
LYMPHOCYTES # BLD AUTO: 29.6 % — SIGNIFICANT CHANGE UP (ref 13–44)
MAGNESIUM SERPL-MCNC: 2.3 MG/DL — SIGNIFICANT CHANGE UP (ref 1.6–2.6)
MCHC RBC-ENTMCNC: 29.8 PG — SIGNIFICANT CHANGE UP (ref 27–34)
MCHC RBC-ENTMCNC: 33.2 GM/DL — SIGNIFICANT CHANGE UP (ref 32–36)
MCV RBC AUTO: 89.7 FL — SIGNIFICANT CHANGE UP (ref 80–100)
MONOCYTES # BLD AUTO: 0.57 K/UL — SIGNIFICANT CHANGE UP (ref 0–0.9)
MONOCYTES NFR BLD AUTO: 8.7 % — SIGNIFICANT CHANGE UP (ref 2–14)
NEUTROPHILS # BLD AUTO: 3.83 K/UL — SIGNIFICANT CHANGE UP (ref 1.8–7.4)
NEUTROPHILS NFR BLD AUTO: 58.7 % — SIGNIFICANT CHANGE UP (ref 43–77)
NON HDL CHOLESTEROL: 61 MG/DL — SIGNIFICANT CHANGE UP
NRBC # BLD: 0 /100 WBCS — SIGNIFICANT CHANGE UP (ref 0–0)
PLATELET # BLD AUTO: 187 K/UL — SIGNIFICANT CHANGE UP (ref 150–400)
POTASSIUM SERPL-MCNC: 3.6 MMOL/L — SIGNIFICANT CHANGE UP (ref 3.5–5.3)
POTASSIUM SERPL-SCNC: 3.6 MMOL/L — SIGNIFICANT CHANGE UP (ref 3.5–5.3)
PROT SERPL-MCNC: 5.8 G/DL — LOW (ref 6–8.3)
PROTHROM AB SERPL-ACNC: 11.3 SEC — SIGNIFICANT CHANGE UP (ref 9.5–13)
RBC # BLD: 3.59 M/UL — LOW (ref 4.2–5.8)
RBC # FLD: 13.6 % — SIGNIFICANT CHANGE UP (ref 10.3–14.5)
SODIUM SERPL-SCNC: 135 MMOL/L — SIGNIFICANT CHANGE UP (ref 135–145)
TRIGL SERPL-MCNC: 66 MG/DL — SIGNIFICANT CHANGE UP
TROPONIN T, HIGH SENSITIVITY RESULT: 9 NG/L — SIGNIFICANT CHANGE UP (ref 0–51)
TSH SERPL-MCNC: 3.18 UIU/ML — SIGNIFICANT CHANGE UP (ref 0.27–4.2)
WBC # BLD: 6.52 K/UL — SIGNIFICANT CHANGE UP (ref 3.8–10.5)
WBC # FLD AUTO: 6.52 K/UL — SIGNIFICANT CHANGE UP (ref 3.8–10.5)

## 2023-08-10 PROCEDURE — 93306 TTE W/DOPPLER COMPLETE: CPT | Mod: 26

## 2023-08-10 PROCEDURE — 99223 1ST HOSP IP/OBS HIGH 75: CPT

## 2023-08-10 RX ORDER — BENZTROPINE MESYLATE 1 MG
1 TABLET ORAL THREE TIMES A DAY
Refills: 0 | Status: DISCONTINUED | OUTPATIENT
Start: 2023-08-10 | End: 2023-08-11

## 2023-08-10 RX ORDER — ENOXAPARIN SODIUM 100 MG/ML
40 INJECTION SUBCUTANEOUS EVERY 24 HOURS
Refills: 0 | Status: DISCONTINUED | OUTPATIENT
Start: 2023-08-10 | End: 2023-08-11

## 2023-08-10 RX ORDER — CLONAZEPAM 1 MG
1 TABLET ORAL THREE TIMES A DAY
Refills: 0 | Status: DISCONTINUED | OUTPATIENT
Start: 2023-08-10 | End: 2023-08-11

## 2023-08-10 RX ORDER — ASPIRIN/CALCIUM CARB/MAGNESIUM 324 MG
81 TABLET ORAL DAILY
Refills: 0 | Status: DISCONTINUED | OUTPATIENT
Start: 2023-08-10 | End: 2023-08-11

## 2023-08-10 RX ORDER — QUETIAPINE FUMARATE 200 MG/1
100 TABLET, FILM COATED ORAL AT BEDTIME
Refills: 0 | Status: DISCONTINUED | OUTPATIENT
Start: 2023-08-10 | End: 2023-08-11

## 2023-08-10 RX ORDER — ASPIRIN/CALCIUM CARB/MAGNESIUM 324 MG
324 TABLET ORAL ONCE
Refills: 0 | Status: DISCONTINUED | OUTPATIENT
Start: 2023-08-10 | End: 2023-08-10

## 2023-08-10 RX ORDER — AMLODIPINE BESYLATE 2.5 MG/1
10 TABLET ORAL DAILY
Refills: 0 | Status: DISCONTINUED | OUTPATIENT
Start: 2023-08-10 | End: 2023-08-11

## 2023-08-10 RX ORDER — METHADONE HYDROCHLORIDE 40 MG/1
60 TABLET ORAL DAILY
Refills: 0 | Status: DISCONTINUED | OUTPATIENT
Start: 2023-08-10 | End: 2023-08-11

## 2023-08-10 RX ORDER — ASPIRIN/CALCIUM CARB/MAGNESIUM 324 MG
162 TABLET ORAL ONCE
Refills: 0 | Status: COMPLETED | OUTPATIENT
Start: 2023-08-10 | End: 2023-08-10

## 2023-08-10 RX ORDER — LEVETIRACETAM 250 MG/1
1000 TABLET, FILM COATED ORAL
Refills: 0 | Status: DISCONTINUED | OUTPATIENT
Start: 2023-08-10 | End: 2023-08-10

## 2023-08-10 RX ORDER — GABAPENTIN 400 MG/1
100 CAPSULE ORAL THREE TIMES A DAY
Refills: 0 | Status: DISCONTINUED | OUTPATIENT
Start: 2023-08-10 | End: 2023-08-10

## 2023-08-10 RX ORDER — ATORVASTATIN CALCIUM 80 MG/1
40 TABLET, FILM COATED ORAL AT BEDTIME
Refills: 0 | Status: DISCONTINUED | OUTPATIENT
Start: 2023-08-10 | End: 2023-08-11

## 2023-08-10 RX ORDER — ARIPIPRAZOLE 15 MG/1
15 TABLET ORAL DAILY
Refills: 0 | Status: DISCONTINUED | OUTPATIENT
Start: 2023-08-10 | End: 2023-08-11

## 2023-08-10 RX ADMIN — Medication 1 MILLIGRAM(S): at 13:43

## 2023-08-10 RX ADMIN — Medication 0.3 MILLIGRAM(S): at 01:17

## 2023-08-10 RX ADMIN — METHADONE HYDROCHLORIDE 60 MILLIGRAM(S): 40 TABLET ORAL at 14:24

## 2023-08-10 RX ADMIN — Medication 1 MILLIGRAM(S): at 21:03

## 2023-08-10 RX ADMIN — ARIPIPRAZOLE 15 MILLIGRAM(S): 15 TABLET ORAL at 11:12

## 2023-08-10 RX ADMIN — ENOXAPARIN SODIUM 40 MILLIGRAM(S): 100 INJECTION SUBCUTANEOUS at 17:22

## 2023-08-10 RX ADMIN — QUETIAPINE FUMARATE 100 MILLIGRAM(S): 200 TABLET, FILM COATED ORAL at 21:04

## 2023-08-10 RX ADMIN — AMLODIPINE BESYLATE 10 MILLIGRAM(S): 2.5 TABLET ORAL at 17:22

## 2023-08-10 RX ADMIN — ATORVASTATIN CALCIUM 40 MILLIGRAM(S): 80 TABLET, FILM COATED ORAL at 21:04

## 2023-08-10 RX ADMIN — GABAPENTIN 100 MILLIGRAM(S): 400 CAPSULE ORAL at 06:41

## 2023-08-10 RX ADMIN — Medication 162 MILLIGRAM(S): at 06:41

## 2023-08-10 RX ADMIN — Medication 1 MILLIGRAM(S): at 06:41

## 2023-08-10 RX ADMIN — GABAPENTIN 100 MILLIGRAM(S): 400 CAPSULE ORAL at 13:42

## 2023-08-10 RX ADMIN — Medication 81 MILLIGRAM(S): at 11:12

## 2023-08-10 RX ADMIN — Medication 1 MILLIGRAM(S): at 21:04

## 2023-08-10 NOTE — PATIENT PROFILE ADULT - FALL HARM RISK - HARM RISK INTERVENTIONS
Assistance OOB with selected safe patient handling equipment/Communicate Risk of Fall with Harm to all staff/Discuss with provider need for PT consult/Monitor gait and stability/Provide patient with walking aids - walker, cane, crutches/Reinforce activity limits and safety measures with patient and family/Review medications for side effects contributing to fall risk/Sit up slowly, dangle for a short time, stand at bedside before walking/Tailored Fall Risk Interventions/Toileting schedule using arm’s reach rule for commode and bathroom/Use of alarms - bed, chair and/or voice tab/Visual Cue: Yellow wristband and red socks/Bed in lowest position, wheels locked, appropriate side rails in place/Call bell, personal items and telephone in reach/Instruct patient to call for assistance before getting out of bed or chair/Non-slip footwear when patient is out of bed/[place] to call system/Physically safe environment - no spills, clutter or unnecessary equipment/Purposeful Proactive Rounding/Room/bathroom lighting operational, light cord in reach

## 2023-08-10 NOTE — PROGRESS NOTE ADULT - PROBLEM SELECTOR PLAN 1
Pt complaining of CP x 1 day that lasted 5 minutes occurring ambulation. Pt reports no CP since being on the floor.   Pt has hx of CAD with prior stents documented but pt does not recall.   -EKG NSR with rate of 85. NHUNG in V1. T wave flattening in V3 and V5. QTc 479ms.   -Trop T 11-->9. .   -CXR 8/9/23: No infiltrate pleural effusion or pneumothorax. Unremarkable cardiac silhouette. No acute bone abnormality. Similar appearance to prior exam 6/20/2019.  -f/u TTE results  -continue with ASA 81mg QD, Atorvastatin 40mg QHS

## 2023-08-10 NOTE — DISCHARGE NOTE PROVIDER - NSDCCPCAREPLAN_GEN_ALL_CORE_FT
PRINCIPAL DISCHARGE DIAGNOSIS  Diagnosis: Angina pectoris  Assessment and Plan of Treatment: You came to the ED complaining of chest pain. However,  your EKG, blood troponin levels showed no signs of a heart attack. You also got an echocardiogram of your heart that showed no signs of heart disease or heart failure.  Please continue Aspirin 81mg daily due to prior history of stents.      SECONDARY DISCHARGE DIAGNOSES  Diagnosis: Essential hypertension  Assessment and Plan of Treatment: You have a history of HTN. Please continue amlodipine 10mg daily.    Diagnosis: DM2 (diabetes mellitus, type 2)  Assessment and Plan of Treatment: Your Hemoglobin A1c is 5.8 and your goal A1c is less than 7.0%. This number measures your average blood sugar level over the last three months.  Maintain a low carbohydrate, low sugar diet, exercise, monitor your fingerstick blood sugars regarly and follow up with your Endocrinologist/Primary Care Doctor.    Diagnosis: Hyperlipidemia  Assessment and Plan of Treatment: Please continue atorvastatin 40mg at bedtime to keep your cholesterol low. High cholesterol contributes to heart disease.    Diagnosis: Chronic schizophrenia  Assessment and Plan of Treatment:     Diagnosis: Opioid dependence  Assessment and Plan of Treatment: Please continue methadone 60mg daily.    Diagnosis: Seizure disorder  Assessment and Plan of Treatment:     Diagnosis: Asthma  Assessment and Plan of Treatment:      PRINCIPAL DISCHARGE DIAGNOSIS  Diagnosis: Angina pectoris  Assessment and Plan of Treatment: You came to the ED complaining of chest pain. However,  your EKG, blood troponin levels showed no signs of a heart attack. You also got an echocardiogram of your heart that showed no signs of heart disease or heart failure.  Please continue Aspirin 81mg daily due to prior history of stents.      SECONDARY DISCHARGE DIAGNOSES  Diagnosis: Essential hypertension  Assessment and Plan of Treatment: You have a history of HTN. Please continue amlodipine 10mg daily.    Diagnosis: DM2 (diabetes mellitus, type 2)  Assessment and Plan of Treatment: Your Hemoglobin A1c is 5.8 and your goal A1c is less than 7.0%. This number measures your average blood sugar level over the last three months.  Maintain a low carbohydrate, low sugar diet, exercise, monitor your fingerstick blood sugars regarly and follow up with your Endocrinologist/Primary Care Doctor.    Diagnosis: Hyperlipidemia  Assessment and Plan of Treatment: Please continue atorvastatin 40mg at bedtime to keep your cholesterol low. High cholesterol contributes to heart disease.    Diagnosis: Chronic schizophrenia  Assessment and Plan of Treatment: You have a a history of schizophrenia. Please continue your home psychiatry meds: Seroquel 100mg at bedtime, clonazepam 1mg three times a day, aripiprazole 15mg oral daily.    Diagnosis: Opioid dependence  Assessment and Plan of Treatment: Please continue methadone 60mg daily.

## 2023-08-10 NOTE — PROGRESS NOTE ADULT - PROBLEM SELECTOR PLAN 3
LDL 48. Total Cholesterol 110.  -Pt not on atorvastatin 40mg at home, but has been on it in the past  -Continue atorvastatin 40mg QHS LDL 48. Total Cholesterol 110.  -Continue atorvastatin 40mg QHS due to ?history of CAD

## 2023-08-10 NOTE — DISCHARGE NOTE PROVIDER - NSDCFUADDINST_GEN_ALL_CORE_FT
Please continue to follow a heart healthy diet, low in sodium, cholesterol and fats.   We recommend a Mediterranean diet:  -Incorporate 2 or more servings per day of vegetables, fruits, and whole grains  -Increase intake of fish and legumes/beans to 2 or more servings per week  -Increase intake of healthy fats, such as olive oil and avocados, and have a handful of nuts/seeds most days  -Reduce red/processed meat consumption to 2 or fewer times per week

## 2023-08-10 NOTE — DISCHARGE NOTE PROVIDER - CARE PROVIDER_API CALL
Rabia Dodge  Cardiology  [address] [address]  Phone: [phone]  Fax: [phone]  Follow Up Time: 1 month

## 2023-08-10 NOTE — PROGRESS NOTE ADULT - PROBLEM SELECTOR PLAN 4
Pt has known history of schizophrenia  - Pt has known history of schizophrenia  -Continue Aripiprazole 15mg QD, benztropine 1mg TID, Quetiapine 100mg QHS, clonazepam 1mg TID  -start Sinequan 100mg daily at bedtime (home med per pharmacy)  -monitor QTC with daily EKGs (QTC currently 479) Pt has known history of schizophrenia  -Continue Aripiprazole 15mg QD, benztropine 1mg TID, Quetiapine 100mg QHS, clonazepam 1mg TID  -Home med: Sinequan 100mg daily at bedtime (home med per pharmacy) - non formulary   -monitor QTC with daily EKGs (QTC currently 479)

## 2023-08-10 NOTE — PROGRESS NOTE ADULT - SUBJECTIVE AND OBJECTIVE BOX
Interventional Cardiology PA Adult Progress Note    INCOMPLETE     C.C.:     Subjective Assessment:      ROS Negative except as per Subjective and HPI  	  MEDICATIONS:  cloNIDine 0.3 milliGRAM(s) Oral daily  nitroglycerin     SubLingual 0.4 milliGRAM(s) SubLingual every 5 minutes PRN        ARIPiprazole 15 milliGRAM(s) Oral daily  benztropine 1 milliGRAM(s) Oral three times a day  clonazePAM  Tablet 1 milliGRAM(s) Oral three times a day  gabapentin 100 milliGRAM(s) Oral three times a day  QUEtiapine 100 milliGRAM(s) Oral at bedtime      atorvastatin 40 milliGRAM(s) Oral at bedtime  dextrose 50% Injectable 25 Gram(s) IV Push once  dextrose 50% Injectable 25 Gram(s) IV Push once  dextrose 50% Injectable 12.5 Gram(s) IV Push once  dextrose Oral Gel 15 Gram(s) Oral once PRN  glucagon  Injectable 1 milliGRAM(s) IntraMuscular once  insulin lispro (ADMELOG) corrective regimen sliding scale   SubCutaneous Before meals and at bedtime    aspirin enteric coated 81 milliGRAM(s) Oral daily  dextrose 5%. 1000 milliLiter(s) IV Continuous <Continuous>  dextrose 5%. 1000 milliLiter(s) IV Continuous <Continuous>  enoxaparin Injectable 40 milliGRAM(s) SubCutaneous every 24 hours      	    [PHYSICAL EXAM:  TELEMETRY:  T(C): 36.7 (08-10-23 @ 08:30), Max: 37.1 (08-09-23 @ 19:56)  HR: 65 (08-10-23 @ 08:30) (65 - 89)  BP: 92/54 (08-10-23 @ 08:30) (92/54 - 128/74)  RR: 18 (08-10-23 @ 08:30) (17 - 18)  SpO2: 100% (08-10-23 @ 08:30) (97% - 100%)  Wt(kg): --  I&O's Summary    09 Aug 2023 07:01  -  10 Aug 2023 07:00  --------------------------------------------------------  IN: 0 mL / OUT: 0 mL / NET: 0 mL    10 Aug 2023 07:01  -  10 Aug 2023 10:44  --------------------------------------------------------  IN: 0 mL / OUT: 0 mL / NET: 0 mL      Height (cm): 167.6 (08-10 @ 00:22)  Weight (kg): 79.7 (08-10 @ 00:22)  BMI (kg/m2): 28.4 (08-10 @ 00:22)  BSA (m2): 1.89 (08-10 @ 00:22)  Mae:  Central/PICC/Mid Line:                                         Appearance: Normal	  HEENT:   Normal oral mucosa, PERRL, EOMI	  Neck: Supple, + JVD/ - JVD; Carotid Bruit   Cardiovascular: Normal S1 S2, No JVD, No murmurs,   Respiratory: Lungs clear to auscultation/Decreased Breath Sounds/No Rales, Rhonchi, Wheezing	  Gastrointestinal:  Soft, Non-tender, + BS	  Skin: No rashes, No ecchymoses, No cyanosis  Extremities: Normal range of motion, No clubbing, cyanosis or edema  Vascular: Peripheral pulses palpable 2+ bilaterally  Neurologic: Non-focal  Psychiatry: A & O x 3, Mood & affect appropriate      	    ECG:  	  RADIOLOGY:   DIAGNOSTIC TESTING:  [ ] Echocardiogram:  [ ]  Catheterization:  [ ] Stress Test:    [ ] CRISTINA  OTHER: 	    LABS:	 	  CARDIAC MARKERS:                                  10.7   6.52  )-----------( 187      ( 10 Aug 2023 05:30 )             32.2     08-10    135  |  98  |  15  ----------------------------<  90  3.6   |  31  |  0.78    Ca    8.4      10 Aug 2023 05:30  Mg     2.3     08-10    TPro  5.8<L>  /  Alb  3.6  /  TBili  0.4  /  DBili  0.2  /  AST  20  /  ALT  16  /  AlkPhos  74  08-10    proBNP:   Lipid Profile:   HgA1c:   TSH:   PT/INR - ( 10 Aug 2023 05:30 )   PT: 11.3 sec;   INR: 0.99          PTT - ( 10 Aug 2023 05:30 )  PTT:27.7 sec    ASSESSMENT/PLAN: 	        DVT ppx:  Dispo:     Interventional Cardiology PA Adult Progress Note        C.C.: Chest pain    Subjective Assessment:  Pt is sleeping in bed. Pt endorses headache and mild SOB. Pt denies CP, nausea, vomiting, palpitations.       ROS Negative except as per Subjective and HPI  	  MEDICATIONS:  cloNIDine 0.3 milliGRAM(s) Oral daily  nitroglycerin     SubLingual 0.4 milliGRAM(s) SubLingual every 5 minutes PRN        ARIPiprazole 15 milliGRAM(s) Oral daily  benztropine 1 milliGRAM(s) Oral three times a day  clonazePAM  Tablet 1 milliGRAM(s) Oral three times a day  gabapentin 100 milliGRAM(s) Oral three times a day  QUEtiapine 100 milliGRAM(s) Oral at bedtime      atorvastatin 40 milliGRAM(s) Oral at bedtime  dextrose 50% Injectable 25 Gram(s) IV Push once  dextrose 50% Injectable 25 Gram(s) IV Push once  dextrose 50% Injectable 12.5 Gram(s) IV Push once  dextrose Oral Gel 15 Gram(s) Oral once PRN  glucagon  Injectable 1 milliGRAM(s) IntraMuscular once  insulin lispro (ADMELOG) corrective regimen sliding scale   SubCutaneous Before meals and at bedtime    aspirin enteric coated 81 milliGRAM(s) Oral daily  dextrose 5%. 1000 milliLiter(s) IV Continuous <Continuous>  dextrose 5%. 1000 milliLiter(s) IV Continuous <Continuous>  enoxaparin Injectable 40 milliGRAM(s) SubCutaneous every 24 hours      	    [PHYSICAL EXAM:  TELEMETRY:  T(C): 36.7 (08-10-23 @ 08:30), Max: 37.1 (08-09-23 @ 19:56)  HR: 65 (08-10-23 @ 08:30) (65 - 89)  BP: 92/54 (08-10-23 @ 08:30) (92/54 - 128/74)  RR: 18 (08-10-23 @ 08:30) (17 - 18)  SpO2: 100% (08-10-23 @ 08:30) (97% - 100%)  Wt(kg): --  I&O's Summary    09 Aug 2023 07:01  -  10 Aug 2023 07:00  --------------------------------------------------------  IN: 0 mL / OUT: 0 mL / NET: 0 mL    10 Aug 2023 07:01  -  10 Aug 2023 10:44  --------------------------------------------------------  IN: 0 mL / OUT: 0 mL / NET: 0 mL      Height (cm): 167.6 (08-10 @ 00:22)  Weight (kg): 79.7 (08-10 @ 00:22)  BMI (kg/m2): 28.4 (08-10 @ 00:22)  BSA (m2): 1.89 (08-10 @ 00:22)                                       Appearance: Normal	  HEENT:   Normal oral mucosa, PERRL, EOMI	  Neck: Supple, - JVD   Cardiovascular: Normal S1 S2, No JVD, No murmurs  Respiratory: Lungs clear to auscultation  Gastrointestinal:  Soft, Non-tender, + BS	  Skin: No rashes, No ecchymoses, No cyanosis  Extremities: Normal range of motion, No clubbing, cyanosis or edema  Vascular: Peripheral pulses palpable 2+ bilaterally  Neurologic: Non-focal  Psychiatry: A & O x 3, Mood & affect appropriate                                      10.7   6.52  )-----------( 187      ( 10 Aug 2023 05:30 )             32.2     08-10    135  |  98  |  15  ----------------------------<  90  3.6   |  31  |  0.78    Ca    8.4      10 Aug 2023 05:30  Mg     2.3     08-10    TPro  5.8<L>  /  Alb  3.6  /  TBili  0.4  /  DBili  0.2  /  AST  20  /  ALT  16  /  AlkPhos  74  08-10    proBNP:   Lipid Profile:   HgA1c:   TSH:   PT/INR - ( 10 Aug 2023 05:30 )   PT: 11.3 sec;   INR: 0.99          PTT - ( 10 Aug 2023 05:30 )  PTT:27.7 sec    ASSESSMENT/PLAN: 	        DVT ppx:  Dispo:

## 2023-08-10 NOTE — DISCHARGE NOTE PROVIDER - NSDCMRMEDTOKEN_GEN_ALL_CORE_FT
amoxicillin-clavulanate 875 mg-125 mg oral tablet: 1 tab(s) orally every 12 hours  Keppra:  orally    amLODIPine 10 mg oral tablet: 1 tab(s) orally once a day  ARIPiprazole 15 mg oral tablet: 1 tab(s) orally once a day  aspirin 81 mg oral delayed release tablet: 1 tab(s) orally once a day  atorvastatin 40 mg oral tablet: 1 tab(s) orally once a day (at bedtime)  benztropine 1 mg oral tablet: 1 tab(s) orally 3 times a day  clonazePAM 1 mg oral tablet: 1 tab(s) orally 3 times a day  methadone 10 mg oral tablet: 6 tab(s) orally once a day  QUEtiapine 100 mg oral tablet: 1 tab(s) orally once a day (at bedtime)   amLODIPine 10 mg oral tablet: 1 tab(s) orally once a day  ARIPiprazole 15 mg oral tablet: 1 tab(s) orally once a day  aspirin 81 mg oral delayed release tablet: 1 tab(s) orally once a day  atorvastatin 40 mg oral tablet: 1 tab(s) orally once a day (at bedtime)  benztropine 1 mg oral tablet: 1 tab(s) orally 3 times a day  clonazePAM 1 mg oral tablet: 1 tab(s) orally 3 times a day  doxepin 100 mg oral capsule: 1 cap(s) orally once a day (at bedtime)  methadone 10 mg oral tablet: 6 tab(s) orally once a day  QUEtiapine 100 mg oral tablet: 1 tab(s) orally once a day (at bedtime)

## 2023-08-10 NOTE — PROGRESS NOTE ADULT - PROBLEM SELECTOR PLAN 7
Pt known to Methadone [facility] clinic.   -Continue with T Th S- 60mg schedule (confirmed dose with ALIZA Tsang LPN at [facility] [phone])    F: None  E: Replete if K<4 or Mag<2  N: DASH Diet  VTEppx: Lovenox  Dispo: pending clinical course Pt known to Methadone [facility] clinic.   -Continue 60mg Methadone daily schedule (confirmed dose with ALIZA Tsang LPN at [facility] [phone])  -monitor QTC with daily EKGs (QTC currently 479)      F: None  E: Replete if K<4 or Mag<2  N: DASH Diet  VTEppx: Lovenox  Dispo: pending clinical course

## 2023-08-10 NOTE — DISCHARGE NOTE PROVIDER - HOSPITAL COURSE
INCOMPLETE      51 y/o male, current smoker (1ppd X25 yrs ) POOR HISTORIAN 2/2 to Schizophrenia   w/PMHx of Schizophrenia, hx of opioid abuse on Methadone 60mg daily ([facility])  CAD s/p stents (does not recall when), DM2, HTN, seizure disorder (pt. reports no longer taking Keppra, not listed from prior hx of medication, will need to confirm w/ pharmacy), afib on ASA presents to [facility] ER this evening, 8/9/23, complaining of  intermittent, exertional, non radiating,  substernal chest pain  X1 day.      In speaking with patient, he reports he was walking in the park, three to four blocks,  when suddenly he began to experience intermittent,   non-radiating substernal chest pain, described as "sharp", rated 6/10 lasting 5minuts  with no associated symptoms.  Patient also endorses b/l LE pain with ambulating 3-4 blocks with relief at rest, he denies numbness, temperature changes.    Pt. states he has not taken his medication for over a week and does not recall names or dosage of medication expect some of his Psych medication and Methadone 60mg daily ([facility] [phone]).  Pt. has poor medical follow up, he reports visiting several hospitals this week and signs out AMA. Patient denies fever, chills, HA, current chest pain, palpitations, SOB, dizziness, diaphoresis, abdominal discomfort and n/v/d.    In ER ECG reveals NSR@84bpm with non specific ST-T wave changes, Troponin T Sensitivity (11) negative X1, , CKMB 3.2,   , H/H 11.2/32.1 Plts 206, Na 133, BUN/Cr 18/0.86, Blood Glucose 166.  VSS Temp 97.6F, /72; HR 80, RR 18  O2 RA 97%  Pt. received ASA 162mg X1 Maalox 30mgPO X 1 in ER. In light of patient's risk factors and Class lll Angina symptoms, pt. is admitted to [facility], Cardiology for further cardiac workup to rule out ACS.    Pt seen and examined at bedside this AM without any complaints or events overnight, VSS, labs and telemetry reviewed and pt stable for discharge as discussed with  ___. Pt has received appropriate discharge instructions, including medication regimen, access site management and follow up with  __ in 1-2 weeks.    Discharge medications: ASA 81mg QD, atorvastatin 40mg QD, aripiprazole 15mg QD, benztropine 1mg oral TID, amlodipine 10mg QD, doxepin 100mgQD   INCOMPLETE      53 y/o male, current smoker (1ppd X25 yrs ) POOR HISTORIAN 2/2 to Schizophrenia   w/PMHx of Schizophrenia, hx of opioid abuse on Methadone 60mg daily ([facility])  CAD s/p stents (does not recall when), DM2, HTN, seizure disorder (pt. reports no longer taking Keppra, not listed from prior hx of medication, afib on ASA presents to [facility] ER 8/9/23, complaining of  intermittent, exertional, non radiating,  substernal chest pain  X1 day.      In speaking with patient, he reports he was walking in the park, three to four blocks,  when suddenly he began to experience intermittent,   non-radiating substernal chest pain, described as "sharp", rated 6/10 lasting 5 minutes with no associated symptoms.  Patient also endorses b/l LE pain with ambulating 3-4 blocks with relief at rest, he denies numbness, temperature changes. Pt states he has not taken his medication for over a week and does not recall names or dosage of medication expect some of his Psych medication and Methadone 60mg daily ([facility] [phone]).  Pt has poor medical follow up and endorsed multiple visits to the  Patient denies fever, chills, HA, current chest pain, palpitations, SOB, dizziness, diaphoresis, abdominal discomfort and n/v/d.    In ER ECG reveals NSR@84bpm with NHUNG in V1. T wave flattening in V3 and V5. QTc 479ms. Troponin T Sensitivity (11) negative X1, , CKMB 3.2,   , H/H 11.2/32.1 Plts 206, Na 133, BUN/Cr 18/0.86, Blood Glucose 166.  VSS Temp 97.6F, /72; HR 80, RR 18  O2 RA 97%  Pt. received ASA 162mg X1 Maalox 30mgPO X 1 in ER. In light of patient's risk factors and Class lll Angina symptoms, pt. is admitted to [facility], Cardiology for further cardiac workup to rule out ACS.    Pt was admitted to cardiac telemetry, remained CP free and HD stable. Pt got a TTE 8/10/23 EF=60-65%, normal RV size and systolic fxn, normal atria, mild AR, aortic sclerosis without significant stenosis, no pericardial effusion. Pt was started on ASA 81mg QD and atorvastatin 40mg due to prior documented history of CAD.     Pt seen and examined at bedside this AM without any complaints or events overnight, VSS, labs and telemetry reviewed and pt stable for discharge as discussed with Dr. Mckeon.  Pt has received appropriate discharge instructions, including medication regimen, access site management and follow up with Dr. Dodge in 1-2 weeks.    Discharge medications: ASA 81mg QD, atorvastatin 40mg QD, amlodipine 10mg QD   53 y/o male, current smoker (1ppd X25 yrs ) POOR HISTORIAN 2/2 to Schizophrenia  w/PMHx of Schizophrenia, hx of opioid abuse on Methadone 60mg daily ([facility])  CAD s/p stents (does not recall when), DM2, HTN, seizure disorder (pt. reports no longer taking Keppra, not listed from prior hx of medication, afib on ASA presents to [facility] ER 8/9/23, complaining of  intermittent, exertional, non radiating,  substernal chest pain  X1 day.      In speaking with patient, he reports he was walking in the park, three to four blocks,  when suddenly he began to experience intermittent,   non-radiating substernal chest pain, described as "sharp", rated 6/10 lasting 5 minutes with no associated symptoms.  Patient also endorses b/l LE pain with ambulating 3-4 blocks with relief at rest, he denies numbness, temperature changes. Pt states he has not taken his medication for over a week and does not recall names or dosage of medication expect some of his Psych medication and Methadone 60mg daily ([facility] [phone]).  Pt has poor medical follow up and endorsed multiple visits to the ER at several hospitals. Patient denies fever, chills, HA, current chest pain, palpitations, SOB, dizziness, diaphoresis, abdominal discomfort and n/v/d.    In ER ECG reveals NSR@84bpm with NHUNG in V1. T wave flattening in V3 and V5. QTc 479ms. Troponin T Sensitivity (11) negative X1, , CKMB 3.2,   , H/H 11.2/32.1 Plts 206, Na 133, BUN/Cr 18/0.86, Blood Glucose 166.  VSS Temp 97.6F, /72; HR 80, RR 18  O2 RA 97%  Pt. received ASA 162mg X1 Maalox 30mgPO X 1 in ER. In light of patient's risk factors and Class lll Angina symptoms, pt was admitted to [facility], Cardiology for further cardiac workup to rule out ACS.    Upon arrival to the floor, pt remained CP free and HD stable. Pt got a TTE 8/10/23 EF=60-65%, normal RV size and systolic fxn, normal atria, mild AR, aortic sclerosis without significant stenosis, no pericardial effusion. Pt was started on ASA 81mg QD and atorvastatin 40mg due to prior documented history of CAD.     Pt seen and examined at bedside this AM without any acute complaints or events overnight, VSS, labs and telemetry reviewed and pt stable medically as discussed with Dr. Mckeon.  Pt has received appropriate discharge instructions, including medication regimen and follow up with Dr. Dodge in 1-2 weeks.     Pt prior to discharge started to express suicidal ideations with intent to kill himself if he were to leave the hospital. Pt was evaluated by the psychiatry team who accepted him for an inpatient psych admission.    Discharge medications: ASA 81mg QD, atorvastatin 40mg QD, amlodipine 10mg QD   51 y/o male, current smoker (1ppd X25 yrs ) POOR HISTORIAN 2/2 to Schizophrenia  w/PMHx of Schizophrenia, hx of opioid abuse on Methadone 60mg daily ([facility])  CAD s/p stents (does not recall when), DM2, HTN, seizure disorder (pt. reports no longer taking Keppra, not listed from prior hx of medication, afib on ASA presents to [facility] ER 8/9/23, complaining of  intermittent, exertional, non radiating,  substernal chest pain lasting 5 minutes with no associated symptoms. Patient also endorsed b/l LE pain with ambulating 3-4 blocks with relief at rest, he denies numbness, temperature changes. Pt states he has not taken his medication for over a week and does not recall names or dosage of medication expect some of his Psych medication and Methadone 60mg daily ([facility] [phone]).  Pt has poor medical follow up and endorsed multiple visits to the ER at several hospitals.     In ER ECG reveals NSR@84bpm with NHUNG in V1. T wave flattening in V3 and V5. QTc 479ms. Troponin T Sensitivity (11) negative X1, , CKMB 3.2,   , H/H 11.2/32.1 Plts 206, Na 133, BUN/Cr 18/0.86, Blood Glucose 166.  VSS Temp 97.6F, /72; HR 80, RR 18  O2 RA 97%  Pt. received ASA 162mg X1 Maalox 30mgPO X 1 in ER. In light of patient's risk factors and Class lll Angina symptoms, pt was admitted to [facility], Cardiology for further cardiac workup to rule out ACS.    Upon arrival to the floor, pt remained CP free and HD stable. Pt got a TTE 8/10/23 EF=60-65%, normal RV size and systolic fxn, normal atria, mild AR, aortic sclerosis without significant stenosis, no pericardial effusion. Pt was started on ASA 81mg QD and atorvastatin 40mg due to prior documented history of CAD. Pt refusing any further ischemic workup and chest pain unlikely to be cardiac in nature.     Pt prior to discharge started to express suicidal ideations with intent to kill himself if he were to leave the hospital. Pt was evaluated by the psychiatry team who accepted him for an inpatient psych admission.    Pt seen and examined at bedside this AM without any acute complaints or events overnight, VSS, labs and telemetry reviewed and pt stable medically as discussed with Dr. Mckeon.  Pt has received appropriate discharge instructions, including medication regimen and follow up with Dr. Dodge in 1-2 weeks.       Discharge medications: ASA 81mg QD, atorvastatin 40mg QD, amlodipine 10mg QD

## 2023-08-10 NOTE — PROGRESS NOTE ADULT - PROBLEM SELECTOR PLAN 2
Pt has history of HTN. /73.  - Pt has history of HTN. /73.  -Home meds: amlodipine 10mg  -continue amlodipine 10mg QD

## 2023-08-10 NOTE — PROGRESS NOTE ADULT - PROBLEM SELECTOR PLAN 5
MjF2d=9.4.  -Continue with moderate insulin sliding scale.  -No diabetic home meds SqI2w=6.4.  -No diabetic home meds  -Continue with moderate insulin sliding scale.

## 2023-08-10 NOTE — PROGRESS NOTE ADULT - ASSESSMENT
53 y/o male, current smoker (1ppd X25 yrs ) POOR HISTORIAN 2/2 to Schizophrenia w/PMHx of Schizophrenia, hx of opioid abuse on Methadone 60mg daily ([facility])  CAD s/p stents (does not recall when), DM2, HTN, seizure disorder (pt. reports no longer taking Keppra, not listed from prior hx of medication, will need to confirm w/ pharmacy), Afib on ASA presents to [facility] ER this evening, 8/9/23, complaining of intermittent, exertional, non-radiating, substernal chest pain x 1 day. Pt was admitted for cardiac telemetry, is getting TTE today and plan for discharge tomorrow.    neuro intensivist via code stroke Dr. Arthur Servin recommended 300 mg aspirin which the patient was given and transfer to [facility] to be admitted to the hospitalist service patient placed to the hospitalist-consultation undertaken with Clemente Vasquez hospitalist on-call will accept the patient on behalf of Dr. Morejon Courser determinants of health impacting treatment or disposition:    (Homelessness, uninsured, no doc, illiterate)     Disposition discussion with patient/family:      (Escalation of care - admission/observation)     Undertaken or considered even when the patient is discharged. De escalation of care (Code status)         Shared Decision Making: Shared decision making concerning patient to be transferred    Code Status Discussion: Full code               CRITICAL CARE TIME   Total Critical Care time was minutes, excluding separately reportable procedures. There was a high probability of clinically significant/life threatening deterioration in the patient's condition which required my urgent intervention. PROCEDURES:  Unless otherwise noted below, none     Procedures    FINAL IMPRESSION      1. TIA (transient ischemic attack)          DISPOSITION/PLAN   DISPOSITION  04/25/2023 01:35:51 PM      PATIENT REFERRED TO:  No follow-up provider specified. DISCHARGE MEDICATIONS:  New Prescriptions    No medications on file     Controlled Substances Monitoring:     No flowsheet data found.     (Please note that portions of this note were completed with a voice recognition program.  Efforts were made to edit the dictations but occasionally words are mis-transcribed.)    Sheila Marquez MD (electronically signed)  Attending Emergency Physician           Sheila Marquez MD  04/25/23 1966       Sheila Marquez MD  04/25/23 1919 53 y/o male, current smoker (1ppd X25 yrs ) POOR HISTORIAN 2/2 to Schizophrenia w/PMHx of Schizophrenia, hx of opioid abuse on Methadone 60mg daily ([facility])  CAD s/p stents (does not recall when), DM2, HTN, seizure disorder (pt. reports no longer taking Keppra, not listed from prior hx of medication, will need to confirm w/ pharmacy), Afib on ASA presents to [facility] ER this evening, 8/9/23, complaining of intermittent, exertional, non-radiating, substernal chest pain x 1 day. In the ED, VSS, EKG shows no ischemic changes, Trop T downtrended, pt asymptomatic. Pt was admitted for cardiac telemetry, is getting TTE today and plan for discharge tomorrow 8/11/23.

## 2023-08-11 ENCOUNTER — INPATIENT (INPATIENT)
Facility: HOSPITAL | Age: 53
LOS: 9 days | Discharge: NURSING, REHAB OR LONG-TERM CARE | DRG: 885 | End: 2023-08-21
Attending: PSYCHIATRY & NEUROLOGY | Admitting: PSYCHIATRY & NEUROLOGY
Payer: MEDICAID

## 2023-08-11 ENCOUNTER — TRANSCRIPTION ENCOUNTER (OUTPATIENT)
Age: 53
End: 2023-08-11

## 2023-08-11 VITALS
OXYGEN SATURATION: 98 % | TEMPERATURE: 98 F | HEART RATE: 68 BPM | SYSTOLIC BLOOD PRESSURE: 119 MMHG | RESPIRATION RATE: 18 BRPM | DIASTOLIC BLOOD PRESSURE: 70 MMHG

## 2023-08-11 DIAGNOSIS — J45.909 UNSPECIFIED ASTHMA, UNCOMPLICATED: ICD-10-CM

## 2023-08-11 PROBLEM — E11.9 TYPE 2 DIABETES MELLITUS WITHOUT COMPLICATIONS: Chronic | Status: ACTIVE | Noted: 2023-08-09

## 2023-08-11 PROBLEM — I25.10 ATHEROSCLEROTIC HEART DISEASE OF NATIVE CORONARY ARTERY WITHOUT ANGINA PECTORIS: Chronic | Status: ACTIVE | Noted: 2023-08-09

## 2023-08-11 LAB
ANION GAP SERPL CALC-SCNC: 6 MMOL/L — SIGNIFICANT CHANGE UP (ref 5–17)
BUN SERPL-MCNC: 14 MG/DL — SIGNIFICANT CHANGE UP (ref 7–23)
CALCIUM SERPL-MCNC: 8.8 MG/DL — SIGNIFICANT CHANGE UP (ref 8.4–10.5)
CHLORIDE SERPL-SCNC: 101 MMOL/L — SIGNIFICANT CHANGE UP (ref 96–108)
CO2 SERPL-SCNC: 29 MMOL/L — SIGNIFICANT CHANGE UP (ref 22–31)
CREAT SERPL-MCNC: 0.81 MG/DL — SIGNIFICANT CHANGE UP (ref 0.5–1.3)
EGFR: 105 ML/MIN/1.73M2 — SIGNIFICANT CHANGE UP
GLUCOSE BLDC GLUCOMTR-MCNC: 111 MG/DL — HIGH (ref 70–99)
GLUCOSE BLDC GLUCOMTR-MCNC: 116 MG/DL — HIGH (ref 70–99)
GLUCOSE BLDC GLUCOMTR-MCNC: 91 MG/DL — SIGNIFICANT CHANGE UP (ref 70–99)
GLUCOSE BLDC GLUCOMTR-MCNC: 94 MG/DL — SIGNIFICANT CHANGE UP (ref 70–99)
GLUCOSE SERPL-MCNC: 82 MG/DL — SIGNIFICANT CHANGE UP (ref 70–99)
HCT VFR BLD CALC: 35.6 % — LOW (ref 39–50)
HGB BLD-MCNC: 11.8 G/DL — LOW (ref 13–17)
MAGNESIUM SERPL-MCNC: 2.2 MG/DL — SIGNIFICANT CHANGE UP (ref 1.6–2.6)
MCHC RBC-ENTMCNC: 30.1 PG — SIGNIFICANT CHANGE UP (ref 27–34)
MCHC RBC-ENTMCNC: 33.1 GM/DL — SIGNIFICANT CHANGE UP (ref 32–36)
MCV RBC AUTO: 90.8 FL — SIGNIFICANT CHANGE UP (ref 80–100)
NRBC # BLD: 0 /100 WBCS — SIGNIFICANT CHANGE UP (ref 0–0)
PLATELET # BLD AUTO: 192 K/UL — SIGNIFICANT CHANGE UP (ref 150–400)
POTASSIUM SERPL-MCNC: 3.6 MMOL/L — SIGNIFICANT CHANGE UP (ref 3.5–5.3)
POTASSIUM SERPL-SCNC: 3.6 MMOL/L — SIGNIFICANT CHANGE UP (ref 3.5–5.3)
RBC # BLD: 3.92 M/UL — LOW (ref 4.2–5.8)
RBC # FLD: 13.8 % — SIGNIFICANT CHANGE UP (ref 10.3–14.5)
SODIUM SERPL-SCNC: 136 MMOL/L — SIGNIFICANT CHANGE UP (ref 135–145)
WBC # BLD: 5.97 K/UL — SIGNIFICANT CHANGE UP (ref 3.8–10.5)
WBC # FLD AUTO: 5.97 K/UL — SIGNIFICANT CHANGE UP (ref 3.8–10.5)

## 2023-08-11 PROCEDURE — 99222 1ST HOSP IP/OBS MODERATE 55: CPT

## 2023-08-11 PROCEDURE — 93010 ELECTROCARDIOGRAM REPORT: CPT

## 2023-08-11 PROCEDURE — 99232 SBSQ HOSP IP/OBS MODERATE 35: CPT

## 2023-08-11 RX ORDER — QUETIAPINE FUMARATE 200 MG/1
1 TABLET, FILM COATED ORAL
Qty: 0 | Refills: 0 | DISCHARGE
Start: 2023-08-11

## 2023-08-11 RX ORDER — ARIPIPRAZOLE 15 MG/1
1 TABLET ORAL
Qty: 0 | Refills: 0 | DISCHARGE
Start: 2023-08-11

## 2023-08-11 RX ORDER — METHADONE HYDROCHLORIDE 40 MG/1
6 TABLET ORAL
Qty: 0 | Refills: 0 | DISCHARGE
Start: 2023-08-11

## 2023-08-11 RX ORDER — CLONAZEPAM 1 MG
1 TABLET ORAL
Qty: 0 | Refills: 0 | DISCHARGE
Start: 2023-08-11

## 2023-08-11 RX ORDER — DOXEPIN HCL 100 MG
1 CAPSULE ORAL
Refills: 0 | DISCHARGE

## 2023-08-11 RX ORDER — DEXTROSE 50 % IN WATER 50 %
15 SYRINGE (ML) INTRAVENOUS ONCE
Refills: 0 | Status: DISCONTINUED | OUTPATIENT
Start: 2023-08-11 | End: 2023-08-21

## 2023-08-11 RX ORDER — BENZTROPINE MESYLATE 1 MG
1 TABLET ORAL THREE TIMES A DAY
Refills: 0 | Status: DISCONTINUED | OUTPATIENT
Start: 2023-08-11 | End: 2023-08-18

## 2023-08-11 RX ORDER — INSULIN LISPRO 100/ML
VIAL (ML) SUBCUTANEOUS
Refills: 0 | Status: DISCONTINUED | OUTPATIENT
Start: 2023-08-11 | End: 2023-08-21

## 2023-08-11 RX ORDER — AMLODIPINE BESYLATE 2.5 MG/1
1 TABLET ORAL
Qty: 0 | Refills: 0 | DISCHARGE
Start: 2023-08-11

## 2023-08-11 RX ORDER — AMLODIPINE BESYLATE 2.5 MG/1
10 TABLET ORAL DAILY
Refills: 0 | Status: DISCONTINUED | OUTPATIENT
Start: 2023-08-11 | End: 2023-08-21

## 2023-08-11 RX ORDER — NICOTINE POLACRILEX 2 MG
1 GUM BUCCAL DAILY
Refills: 0 | Status: DISCONTINUED | OUTPATIENT
Start: 2023-08-11 | End: 2023-08-21

## 2023-08-11 RX ORDER — ATORVASTATIN CALCIUM 80 MG/1
40 TABLET, FILM COATED ORAL AT BEDTIME
Refills: 0 | Status: DISCONTINUED | OUTPATIENT
Start: 2023-08-11 | End: 2023-08-21

## 2023-08-11 RX ORDER — ARIPIPRAZOLE 15 MG/1
15 TABLET ORAL DAILY
Refills: 0 | Status: DISCONTINUED | OUTPATIENT
Start: 2023-08-11 | End: 2023-08-15

## 2023-08-11 RX ORDER — POTASSIUM CHLORIDE 20 MEQ
40 PACKET (EA) ORAL ONCE
Refills: 0 | Status: COMPLETED | OUTPATIENT
Start: 2023-08-11 | End: 2023-08-11

## 2023-08-11 RX ORDER — ASPIRIN/CALCIUM CARB/MAGNESIUM 324 MG
1 TABLET ORAL
Qty: 0 | Refills: 0 | DISCHARGE
Start: 2023-08-11

## 2023-08-11 RX ORDER — CLONAZEPAM 1 MG
1 TABLET ORAL THREE TIMES A DAY
Refills: 0 | Status: DISCONTINUED | OUTPATIENT
Start: 2023-08-11 | End: 2023-08-15

## 2023-08-11 RX ORDER — BENZTROPINE MESYLATE 1 MG
1 TABLET ORAL
Qty: 0 | Refills: 0 | DISCHARGE
Start: 2023-08-11

## 2023-08-11 RX ORDER — ATORVASTATIN CALCIUM 80 MG/1
1 TABLET, FILM COATED ORAL
Qty: 0 | Refills: 0 | DISCHARGE
Start: 2023-08-11

## 2023-08-11 RX ORDER — METHADONE HYDROCHLORIDE 40 MG/1
60 TABLET ORAL DAILY
Refills: 0 | Status: DISCONTINUED | OUTPATIENT
Start: 2023-08-11 | End: 2023-08-16

## 2023-08-11 RX ORDER — ACETAMINOPHEN 500 MG
650 TABLET ORAL ONCE
Refills: 0 | Status: COMPLETED | OUTPATIENT
Start: 2023-08-11 | End: 2023-08-11

## 2023-08-11 RX ORDER — ASPIRIN/CALCIUM CARB/MAGNESIUM 324 MG
81 TABLET ORAL DAILY
Refills: 0 | Status: DISCONTINUED | OUTPATIENT
Start: 2023-08-11 | End: 2023-08-21

## 2023-08-11 RX ORDER — OLANZAPINE 15 MG/1
5 TABLET, FILM COATED ORAL EVERY 6 HOURS
Refills: 0 | Status: COMPLETED | OUTPATIENT
Start: 2023-08-11 | End: 2023-08-14

## 2023-08-11 RX ORDER — QUETIAPINE FUMARATE 200 MG/1
100 TABLET, FILM COATED ORAL AT BEDTIME
Refills: 0 | Status: DISCONTINUED | OUTPATIENT
Start: 2023-08-11 | End: 2023-08-21

## 2023-08-11 RX ADMIN — ATORVASTATIN CALCIUM 40 MILLIGRAM(S): 80 TABLET, FILM COATED ORAL at 21:08

## 2023-08-11 RX ADMIN — Medication 81 MILLIGRAM(S): at 09:57

## 2023-08-11 RX ADMIN — Medication 1 MILLIGRAM(S): at 05:21

## 2023-08-11 RX ADMIN — Medication 40 MILLIEQUIVALENT(S): at 09:57

## 2023-08-11 RX ADMIN — Medication 650 MILLIGRAM(S): at 10:57

## 2023-08-11 RX ADMIN — Medication 650 MILLIGRAM(S): at 09:57

## 2023-08-11 RX ADMIN — ARIPIPRAZOLE 15 MILLIGRAM(S): 15 TABLET ORAL at 09:57

## 2023-08-11 RX ADMIN — AMLODIPINE BESYLATE 10 MILLIGRAM(S): 2.5 TABLET ORAL at 05:21

## 2023-08-11 RX ADMIN — Medication 1 MILLIGRAM(S): at 21:09

## 2023-08-11 RX ADMIN — Medication 1 MILLIGRAM(S): at 21:08

## 2023-08-11 RX ADMIN — QUETIAPINE FUMARATE 100 MILLIGRAM(S): 200 TABLET, FILM COATED ORAL at 21:08

## 2023-08-11 RX ADMIN — Medication 1 MILLIGRAM(S): at 13:28

## 2023-08-11 RX ADMIN — METHADONE HYDROCHLORIDE 60 MILLIGRAM(S): 40 TABLET ORAL at 12:05

## 2023-08-11 NOTE — BH CONSULTATION LIAISON ASSESSMENT NOTE - DETAILS
Patient is unclear as to timeline of latest suicide attempt however he states he has attempted to kill himself "over 7 times" and that in the past 2 weeks he attempted to jump off a bridge and overdose on pills

## 2023-08-11 NOTE — BH CONSULTATION LIAISON ASSESSMENT NOTE - NSBHCHARTREVIEWVS_PSY_A_CORE FT
Vital Signs Last 24 Hrs  T(C): 36.5 (11 Aug 2023 12:08), Max: 36.9 (10 Aug 2023 17:19)  T(F): 97.7 (11 Aug 2023 12:08), Max: 98.5 (10 Aug 2023 17:19)  HR: 68 (11 Aug 2023 12:08) (52 - 80)  BP: 119/70 (11 Aug 2023 12:08) (107/65 - 119/70)  BP(mean): 81 (11 Aug 2023 05:47) (81 - 84)  RR: 18 (11 Aug 2023 12:08) (17 - 18)  SpO2: 98% (11 Aug 2023 12:08) (96% - 99%)    Parameters below as of 11 Aug 2023 12:08  Patient On (Oxygen Delivery Method): room air

## 2023-08-11 NOTE — BH CONSULTATION LIAISON ASSESSMENT NOTE - HPI (INCLUDE ILLNESS QUALITY, SEVERITY, DURATION, TIMING, CONTEXT, MODIFYING FACTORS, ASSOCIATED SIGNS AND SYMPTOMS)
52 year old M, no current residence, single,  PPHx of schizophrenia, hx of opioid abuse on Methadone 60mg daily ([facility])  CAD s/p stents, DM2, HTN, currently admitted to medicine and complaining of Suicidal ideation after being told this morning that he was clear for discharge. Upon approach, patient is lying in bed comfortably. Patient is alert and oriented and engages with interviewer. Patient is cooperative and answers most questions appropriately.The patient is a poor historian and stated he was feeling confused during the interview. Patient states that until 2 weeks ago he was staying with his sister in the city but that she told him he can no longer stay with her and since then he has been wandering the streets, trying to stay with friends and reports a suicide attempt "about a week ago" first by trying to climb a fense to jump off an overpass and then by "taking some benzo's I found on the street". The patient reports that this morning when he was told there was a possibility he could be discharged he felt too ashamed to tell the medical team that he didn't have a place to stay and insisted that he would hurt himself if discharged. He denies HI, admits to "a little" VH and AH, and denies persecutory delusions.         52 year old M, no current residence, single,  PPHx of schizophrenia, hx of opioid abuse on Methadone 60mg daily ([facility])  CAD s/p stents, DM2, HTN, currently admitted to medicine and complaining of Suicidal ideation after being told this morning that he was clear for discharge. Upon approach, patient is lying in bed comfortably. Patient is alert and oriented and engages with interviewer. Patient is cooperative and answers most questions appropriately.The patient is a poor historian and stated he was feeling confused during the interview. Patient states that until 2 weeks ago he was staying with his sister in the city but that she told him he can no longer stay with her and since then he has been wandering the streets, trying to stay with friends. Suicidal ideation started after being kicked out his sisters house. He reports a suicide attempt "about a week ago" first by trying to climb a fense to jump off an overpass and then by "taking some benzo's I found on the street". The patient reports that this morning when he was told there was a possibility he could be discharged he felt too ashamed to tell the medical team that he didn't feel he would be able to maintain his safety if discharged. He denies HI, admits to "a little" VH and AH, and endorses some paranoia questioning if his medications have been poisoned. Patient denies racing thoughts, grandiosity, rapid speech, thought insertion. Patient denies current alcohol or illicit drug use but timelines is unclear as to his previous attempts at suicide via fentanyl and other drugs.          52 year old M, no current residence, single,  PPHx of schizophrenia, hx of opioid abuse on Methadone 60mg daily ([facility])  CAD s/p stents, DM2, HTN, currently admitted to medicine and complaining of Suicidal ideation after being told this morning that he was clear for discharge. History is collected from chart review and from the patient who is a poor historian. Upon approach, patient is lying in bed comfortably. Patient is alert and oriented and engages with interviewer. Patient states that until 2 weeks ago he was staying with his sister in the city but that she told him he can no longer stay with her and since then he has been wandering the streets, trying to stay with friends. Suicidal ideation started after being kicked out his sisters house. He reports a suicide attempt in the past two weeks in which he tried to jump off an over pass but could not climb the fence. He also reports taking some benzos that he found on the street in an attempt to overdose. He denies telling anyone about these attempts. The patient reports that this morning when he was told there was a possibility he could be discharged he felt too ashamed to tell the medical team that he didn't feel he would be able to maintain his safety if discharged. He denies HI, endorses AH and paranoia questioning if his medications have been poisoned. Patient denies racing thoughts, grandiosity, rapid speech, increased goal-directed activity, decreased need for sleep. Patient denies current alcohol or illicit drug use and has been on methadone for 8 months

## 2023-08-11 NOTE — BH CONSULTATION LIAISON ASSESSMENT NOTE - OTHER
patients history/timelines of recent events is disorganized patient responded "A little" to questions regarding Ah, Vh, States that he was best controlled when on a different antipsychotic than the one he is on now patient responded "A little" to questions regarding Ah, States that he was best controlled when on Olanzapine before being taken off for metabolic side effects

## 2023-08-11 NOTE — BH CONSULTATION LIAISON ASSESSMENT NOTE - RISK ASSESSMENT
Protective factors include seeking out treatment.  Modifiable risk factors include SI/I/P, psychiatric illness, impulsivity, recent psychosocial stressors of being kicked out of his sisters place, lack of social support/living alone/single, unemployment.  Static risk factors include prior SA,.   Protective factors include seeking out treatment.  Modifiable risk factors include SI/I/P, psychiatric illness, impulsivity, recent psychosocial stressors of being kicked out of his sisters place, lack of social support/living alone/single, unemployment.  Static risk factors include prior SA.

## 2023-08-11 NOTE — PROGRESS NOTE ADULT - NS ATTEND AMEND GEN_ALL_CORE FT
Patient is a 53 yo Male, with Pmhx of CAD s/p PCI (unclear when/where), Afib on ASA, HTN,  Schizophrenia, opioid abuse on Methadone 60mg daily ([facility])  DM2, HTN, seizure disorder, who presented to [facility] ER  complaining of 1 brief episode of exertional, non-radiating, substernal chest pain of short duration, that resolved on its own, Admitted to tele and R/O for ACS, deferred further ischemic evaluation, pending DC on 8/11 when he reported active suicidal ideations    Physical Exam: NAD; RRR, Normal S1S2, CTABL, no LE edema    Review of Studies:  - ECG: NSR, non specific ST-T wave changes  - Tele: No events  - Echo: Normal Biventricular function without valvular heart disease    -Suicidal Ideation: Patient with Schizophrenia, well known to psych service who reported active suicidal ideation at time of DC. Reports he would go to [facility] and kill himself. Psych to come evaluate. Will consider medicine for transfer to [facility] if not deemed suitable for inpatient psych, as pt with no cardiac needs, with no need for tele  - CAD: Unclear History of CAD with PCI?. Reports chest discomfort occurred while walking, was brief in nature and subsided. He is unable to tell is this is similar to when he needed the stents, however it is unclear if the patient has any stents at all. Reports multiple ER visits this week for various reasons and medical problems where he has left AMA. Yesterday Reported he would leave hospital today or tomorrow morning ''depending on his mood'' and independent of our clinical assessment. Clinically he has not any repeated anginal symptoms since admission. EKG reviewed and non ischemic. Troponin negative X2 on admission. Echo with normal Biventricular function without valvular heart disease. No events on tele. It is possible patient has stable angina however will not pursue ischemic workup as he has deferred and no further episodes since admission. Cont with ASA 81 mg po daily and Lipitor 40 mg po daily.   - pAfib?: Questionable Hx of paroxysmal Afib, on ASA 81 mg po daily? However patient has remained in NSR here. OSH documents have no records of atrial fibrillation and patient himself is not sure if he has been diagnosed with any arrythmia. As such will defer AC for now. Patient would have a CHADVASC of 2, if he has documented CAD.  - HTN: Cont with Norvasc 10 mg and Lipitor 40  - Opiod Dependance: Cont with Methadone and Klonopin. Confirmed outpatient Meds  - DISPO; Pending psych eval. No cardiac needs if accepted to 8 uris
Patient is a 53 yo Male, with Pmhx of CAD s/p PCI (unclear when/where), Afib on ASA, HTN,  Schizophrenia, opioid abuse on Methadone 60mg daily ([facility])  DM2, HTN, seizure disorder, who presented to [facility] ER  complaining of 1 brief episode of exertional, non-radiating, substernal chest pain of short duration, that resolved on its own, Admitted for ACS r/O    Physical Exam: NAD; RRR, Normal S1S2, CTABL, no LE edema    Review of Studies:  - ECG: NSR, non specific ST-T wave changes  - Tele: No events  - Echo: Pending    - CAD: Unclear History of CAD with PCI?. Reports chest discomfort occurred while walking, was brief in nature and subsided. He is unable to tell is this is similar to when he needed the stents, however it is unclear if the patient has any stents at all. Reports multiple ER visits this week where he has left AMA. Reports he will leave hospital today or tomorrow morning ''depending on his mood'' and independent of our clinical assessment. Clinically he has not any repeated anginal symptoms since admission. EKG reviewed and non ischemic. Troponin negative X2. It is possible patient has stable angina however will not pursue ischemic workup as he has clearly stated he will soon leave and will likely not take the medications prescribed. Cont with ASA 81 mg po daily and lipitor 40 mg po daily. Will obtain Echo for structural assessment  - pAfib?: Questionable Hx of paroxysmal Afib, on ASA 81 mg po daily? However patient has remained in NSR here. OSH documents have no records of atrial fibrillation and patient himself is not sure if he has been diagnosed with any arrythmia. As such will defer AC for now. Patient would have a CHADVASC of 2, if he has documented CAD.  - HTN: Cont with Norvasc 10 mg and Lipitor 40  - Opiod Dependance: Cont with Methadone and Klonopin. Confirmed outpatient Meds  - Possibility patient might AMA today or tomorrow am.

## 2023-08-11 NOTE — BH PATIENT PROFILE - FALL HARM RISK - UNIVERSAL INTERVENTIONS
Bed in lowest position, wheels locked, appropriate side rails in place/Call bell, personal items and telephone in reach/Instruct patient to call for assistance before getting out of bed or chair/Non-slip footwear when patient is out of bed/[place] to call system/Physically safe environment - no spills, clutter or unnecessary equipment/Purposeful Proactive Rounding/Room/bathroom lighting operational, light cord in reach

## 2023-08-11 NOTE — BH CONSULTATION LIAISON ASSESSMENT NOTE - ADDITIONAL DETAILS / COMMENTS
Orientation: Time Place Person  Attention: WNL  Registration: WNL  Recall at 3 minutes: WNL  Unable to assess due to:

## 2023-08-11 NOTE — PROGRESS NOTE ADULT - SUBJECTIVE AND OBJECTIVE BOX
Left ear infection: amoxicillin 3 times daily for 10 days.       Otitis media aguda con infección (ernesto)    Gaona hijo tiene jeffry infección del oído (otitis media aguda) causada por bacterias o un hongo.  El oído medio es el espacio que se encuentra detrás del tímpano. La trompa de Familia conecta el oído con el pasaje nasal. Las trompas de Familia ayudan a drenar el líquido de los oídos. También mantienen el equilibrio entre la presión dentro de los oídos y fuera de los oídos. Estas trompas son más cortas y están ubicadas de manera más horizontal en los niños, por eso, es más probable que se bloqueen. Un bloqueo hace que se acumulen líquido y presión en el oído medio. En lefty líquido, pueden crecer bacterias u hongos y provocar jeffry infección de oído. Esta infección suele conocerse luke “dolor de oído”.  Gaona principal síntoma es el dolor en el oído. Otros síntomas pueden incluir tirarse de la oreja, estar más molesto que lo habitual, tener menos apetito, vómito o diarrea. También puede que gaona hijo tenga dificultades para oír scarlett. Es posible que gaona hijo haya tenido lori jeffry infección respiratoria.  Jeffry infección de oído puede desaparecer por sí marita. O puede que gaona hijo necesite doron medicamentos. Jeffry vez que se vaya la infección, es posible que gaona hijo siga teniendo líquido en el oído medio, el cual puede tardar semanas o meses en desaparecer. Gus lana período, es posible que gaona hijo tenga jeffry pérdida temporal de la audición, denise el jr de los síntomas del dolor de oído deberían desaparecer.  Cuidados en gaona casa  Siga estos consejos para cuidar de gaona hijo en gaona casa:  · El proveedor de atención médica probablemente le recetará medicamentos para aliviar el dolor. También es posible que le recete antibióticos o antifúngicos para tratar la infección. Pueden ser medicamentos líquidos que el ernesto deberá doron por la boca. O puede que jason gotas para colocarle en los oídos. Siga las instrucciones del proveedor  al darle estos medicamentos a gaona hijo.  · Dado que las infecciones de oído pueden desaparecer por sí solas, es posible que el proveedor sugiera esperar algunos días antes de darle medicamentos para la infección a gaona hijo.  · Para aliviar el dolor, benjamin que gaona hijo descanse sentado en posición recta. Puede colocarle compresas calientes o frías contra la oreja para ayudar a calmar el dolor.  · Mantenga el oído seco. Benjamin que gaona hijo use jeffry gorra de baño al ducharse o bañarse.  · Evite fumar cerca de gaona hijo, ya que el humo de segunda mano aumenta los riesgos de tener infecciones de oído en los niños.  · Asegúrese de que gaona hijo reciba todas las vacunas que corresponda.  · Cuando le dé el biberón, gaona bebé no debe estar acostado boca arriba. (Esta posición puede causar infección del oído medio porque permite que la leche pase hacia las trompas de Familia).  · Si está amamantando a gaona bebé, siga haciéndolo hasta que gaona hijo tenga entre seis y doce meses de edad.  Para aplicar las gotas en los oídos:  1. Coloque el frasco en [place] si guarda el medicamento en el refrigerador. Las gotas frías en el oído causan molestias.  2. Benjamin que el ernesto se acueste sobre jeffry superficie plana. Suavemente, sostenga la marielena del ernesto hacia un lado.  3. Quite toda secreción que pudiese tener el oído con un pañuelo de papel o un hisopo de algodón limpios. Limpie solo el oído externo. No coloque el hisopo de algodón dentro del canal auditivo.  4. Con suavidad, tire del lóbulo de la oreja hacia arriba y hacia atrás para enderezar el canal auditivo.  5. Sostenga el gotero a alrededor de un centímetro del canal auditivo para evitar que el gotero se contamine. Coloque las gotas contra el costado del canal auditivo.  6. Benjamin que gaona hijo se quede acostado giancarlo dos o hitesh minutos para que el medicamento pueda entrar en el canal auditivo. Si gaona hijo no tiene dolor, masajéele suavemente el oído externo, cerca de la abertura.  7. Limpie  Interventional Cardiology PA Adult Progress Note    C.C.:   INCOMPLETE    Subjective Assessment:      ROS Negative except as per Subjective and HPI  	  MEDICATIONS:  amLODIPine   Tablet 10 milliGRAM(s) Oral daily  nitroglycerin     SubLingual 0.4 milliGRAM(s) SubLingual every 5 minutes PRN        ARIPiprazole 15 milliGRAM(s) Oral daily  benztropine 1 milliGRAM(s) Oral three times a day  clonazePAM  Tablet 1 milliGRAM(s) Oral three times a day  methadone    Tablet 60 milliGRAM(s) Oral daily  QUEtiapine 100 milliGRAM(s) Oral at bedtime      atorvastatin 40 milliGRAM(s) Oral at bedtime  dextrose 50% Injectable 25 Gram(s) IV Push once  dextrose 50% Injectable 25 Gram(s) IV Push once  dextrose 50% Injectable 12.5 Gram(s) IV Push once  dextrose Oral Gel 15 Gram(s) Oral once PRN  glucagon  Injectable 1 milliGRAM(s) IntraMuscular once  insulin lispro (ADMELOG) corrective regimen sliding scale   SubCutaneous Before meals and at bedtime    aspirin enteric coated 81 milliGRAM(s) Oral daily  dextrose 5%. 1000 milliLiter(s) IV Continuous <Continuous>  dextrose 5%. 1000 milliLiter(s) IV Continuous <Continuous>  enoxaparin Injectable 40 milliGRAM(s) SubCutaneous every 24 hours      	    [PHYSICAL EXAM:  TELEMETRY:  T(C): 36.5 (08-11-23 @ 12:08), Max: 36.9 (08-10-23 @ 17:19)  HR: 68 (08-11-23 @ 12:08) (52 - 80)  BP: 119/70 (08-11-23 @ 12:08) (107/65 - 119/70)  RR: 18 (08-11-23 @ 12:08) (17 - 18)  SpO2: 98% (08-11-23 @ 12:08) (96% - 99%)  Wt(kg): --  I&O's Summary    10 Aug 2023 07:01  -  11 Aug 2023 07:00  --------------------------------------------------------  IN: 0 mL / OUT: 0 mL / NET: 0 mL    11 Aug 2023 07:01  -  11 Aug 2023 12:09  --------------------------------------------------------  IN: 180 mL / OUT: 0 mL / NET: 180 mL        Mae:  Central/PICC/Mid Line:                                         Appearance: Normal	  HEENT:   Normal oral mucosa, PERRL, EOMI	  Neck: Supple, + JVD/ - JVD; Carotid Bruit   Cardiovascular: Normal S1 S2, No JVD, No murmurs,   Respiratory: Lungs clear to auscultation/Decreased Breath Sounds/No Rales, Rhonchi, Wheezing	  Gastrointestinal:  Soft, Non-tender, + BS	  Skin: No rashes, No ecchymoses, No cyanosis  Extremities: Normal range of motion, No clubbing, cyanosis or edema  Vascular: Peripheral pulses palpable 2+ bilaterally  Neurologic: Non-focal  Psychiatry: A & O x 3, Mood & affect appropriate      	    ECG:  	  RADIOLOGY:   DIAGNOSTIC TESTING:  [ ] Echocardiogram:  [ ]  Catheterization:  [ ] Stress Test:    [ ] CRISTINA  OTHER: 	    LABS:	 	  CARDIAC MARKERS:                                  11.8   5.97  )-----------( 192      ( 11 Aug 2023 06:14 )             35.6     08-11    136  |  101  |  14  ----------------------------<  82  3.6   |  29  |  0.81    Ca    8.8      11 Aug 2023 06:14  Mg     2.2     08-11    TPro  5.8<L>  /  Alb  3.6  /  TBili  0.4  /  DBili  0.2  /  AST  20  /  ALT  16  /  AlkPhos  74  08-10    proBNP:   Lipid Profile:   HgA1c:   TSH:   PT/INR - ( 10 Aug 2023 05:30 )   PT: 11.3 sec;   INR: 0.99          PTT - ( 10 Aug 2023 05:30 )  PTT:27.7 sec    ASSESSMENT/PLAN: 	        DVT ppx:  Dispo:     el exceso de medicamento del oído externo con jeffry olimpia de algodón limpia.  Visitas de control  Programe jeffry visita de control con el proveedor de atención médica de gaona hijo o según se le haya indicado. Gaona hijo necesitará que vuelvan a revisarle el oído para asegurarse de que la infección se ha resuelto. Consulte a gaona médico para saber cuándo querría volver a tae a gaona hijo.  Nota especial para los padres  Si gaona hijo sigue teniendo dolor de oído, puede que deban colocarle tubos en los oídos. El proveedor le colocará pequeños tubos en el tímpano de gaona hijo para ayudar a impedir que el líquido siga acumulándose. Kelley procedimiento es simple y funciona scarlett.  Cuándo debe buscar atención médica  A menos que el proveedor de atención médica de gaona hijo le haya indicado otra cosa, llame enseguida al proveedor de atención médica de gaona hijo si el ernesto presenta cualquiera de los siguientes síntomas:  · Gaona hijo tiene hitesh meses de edad o menos y tiene jeffry temperatura de 100.4º F (38º C) o más. (Busque atención médica de inmediato. La fiebre en un bebé pequeño puede significar jeffry infección peligrosa).  · Gaona hijo tiene menos de dos años y gaona fiebre de 100.4° F (38° C) continúa por más de un día.  · Gaona hijo tiene dos años o más y tiene fiebre de 100.4º F (38º C) que continúa por más de hitesh días.  · Gaona hijo, de cualquier edad, tiene fiebre a repetición por encima de los 104° F (40° C).  También llame inmediatamente al proveedor de atención médica de gaona hijo si se presenta cualquiera de las siguientes situaciones:  · Síntomas nuevos, especialmente, inflamación alrededor de la oreja o debilidad en los músculos de la cordell.  · Dolor muy farnaz.  · La infección parece empeorar en lugar de mejorar.  · Dolor en el rashi.  · Gaona hijo se ve muy enfermo (no actúa luke siempre).  · Fiebre o dolor que no mejora con antibióticos después de 48 horas.  © 0078-6774 [employer] [address] [place] [address], [place] [address]. Todos los  derechos reservados. Esta información no pretende sustituir la atención médica profesional. Sólo gaona médico puede diagnosticar y tratar un problema de juni.

## 2023-08-11 NOTE — BH CONSULTATION LIAISON ASSESSMENT NOTE - CURRENT MEDICATION
MEDICATIONS  (STANDING):  amLODIPine   Tablet 10 milliGRAM(s) Oral daily  ARIPiprazole 15 milliGRAM(s) Oral daily  aspirin enteric coated 81 milliGRAM(s) Oral daily  atorvastatin 40 milliGRAM(s) Oral at bedtime  benztropine 1 milliGRAM(s) Oral three times a day  clonazePAM  Tablet 1 milliGRAM(s) Oral three times a day  dextrose 5%. 1000 milliLiter(s) (100 mL/Hr) IV Continuous <Continuous>  dextrose 5%. 1000 milliLiter(s) (50 mL/Hr) IV Continuous <Continuous>  dextrose 50% Injectable 25 Gram(s) IV Push once  dextrose 50% Injectable 25 Gram(s) IV Push once  dextrose 50% Injectable 12.5 Gram(s) IV Push once  enoxaparin Injectable 40 milliGRAM(s) SubCutaneous every 24 hours  glucagon  Injectable 1 milliGRAM(s) IntraMuscular once  insulin lispro (ADMELOG) corrective regimen sliding scale   SubCutaneous Before meals and at bedtime  methadone    Tablet 60 milliGRAM(s) Oral daily  QUEtiapine 100 milliGRAM(s) Oral at bedtime    MEDICATIONS  (PRN):  dextrose Oral Gel 15 Gram(s) Oral once PRN Blood Glucose LESS THAN 70 milliGRAM(s)/deciliter  nitroglycerin     SubLingual 0.4 milliGRAM(s) SubLingual every 5 minutes PRN Chest Pain

## 2023-08-11 NOTE — BH CONSULTATION LIAISON ASSESSMENT NOTE - NSBHATTESTCOMMENTATTENDFT_PSY_A_CORE
Patient is a 51 y/o man with history of schizoaffective disorder and opioid use disorder on methadone 60mg, currently in outpatient treatment at [facility], [place], operated by [facility], admitted to [facility] for chest pain on the cardiac floor, with a negative workup. Upon discharge patient verbalized to the [facility] with plan to jump off a bridge. He reports low mood and AH in context of multiple psychosocial stressors and poor adherence with treatment. He is requesting a voluntary admission to address his current symptoms. Secondary gain cannot be ruled out at this point, however per psyckes there is not prior dg of malingering. Plan:- admit to inpatient psychiatry, voluntary status; -continue home medications Patient is a 51 y/o man with history of schizoaffective disorder and opioid use disorder on methadone 60mg, currently in outpatient treatment at [facility], admitted to [facility] for chest pain on the cardiac floor, with a negative workup. Upon discharge patient verbalized to the  SI with plan to jump off a bridge. He reports low mood and AH in context of multiple psychosocial stressors and poor adherence with treatment. He is requesting a voluntary admission to address his current symptoms. Secondary gain cannot be ruled out at this point, however per psyckes there is not prior dg of malingering. Plan:- admit to inpatient psychiatry, voluntary status; -continue home medications abilify 15mg po daily, clonazepam 1mg TID prn, quetiapine 100mg po qhs; -prns for agitation with olanzapine 5mg po q6h; -CL to follow until transfer. Patient is a 51 y/o man with history of schizoaffective disorder and opioid use disorder on methadone 60mg, currently in outpatient treatment at [facility], admitted to [facility] for chest pain on the cardiac floor, with a negative workup. Upon discharge patient verbalized to the  SI with plan to jump off a bridge. He reports low mood and AH in context of multiple psychosocial stressors and poor adherence with treatment. He is requesting a voluntary admission to address his current symptoms. Secondary gain cannot be ruled out at this point, however per psyckes there is not prior dg of malingering. Plan:- admit to inpatient psychiatry, voluntary status; -continue home medications abilify 15mg po daily, clonazepam 1mg TID prn, quetiapine 100mg po qhs, cogentin 1mg TID ; -prns for agitation with olanzapine 5mg po q6h; -CL to follow until transfer.

## 2023-08-11 NOTE — BH CHART NOTE - NSEVENTNOTEFT_PSY_ALL_CORE
Mata Jones  MRN: [id number]  : 1970    ACCEPTANCE NOTE    HPI:  52 year old male, single, undomiciled, unemployed with PPH HTN, HLD, DM2, CAD s/p stents and PPH schizoaffective disorder bipolar type, SIMD, Clust-B Personality Traits, Polysubstance Use Disorder (Tobacco, Alcohol, Cocaine, Stimulant), Opioid Use Disorder (currently prescribed Methadone 60mg daily at [facility]), history of prior inpatient psychiatric hospitalizations most recently at [facility] (22 – 22) for SIMD, currently engaged in outpatient psychiatric treatment at [facility], patient-reported history of suicide attempts, no history of NSSIB, no history of violence, initially presenting to [facility] ED on the evening of 23 complaining of intermittent, exertional, non-radiating, substernal chest pain x 1 day and admitted to [facility] medicine for cardiac telemetry, now reporting suicidal ideation when informed of plan for discharge and admitted voluntarily to [address] for safety, medication optimization and psychiatric stabilization.    On evaluation, the patient is calm, cooperative, fairly-related, soft-spoken with fair eye contact, constricted affect and demonstrating good behavioral control and linear thought processes.  The patient states that he wanted to be admitted so that he can "get better and be on medications."  The patient then expresses that he heard that his sister and nephew were killed and it is imperative to get in contact with them.  When asked where he received such information, the patient only states "I heard it" but declines to elaborate further.  The patient reports recent SI and a history of AH but currently denies SI/HI, intent, plan and AVH.  The patient commits to safety while on [address].  The patient's medications are reviewed.  All questions and concerns addressed.      COLLATERAL:    Catherine Person (Sister): [phone]    PSYCKES:  MEDICAID ID: [id number]  TX FOR SI: SI x11 (First Date 2012) most recently at [facility] (22), Treatment for suicide attempt & Self-Inflicted Poisoning (2017) at [facility], history of opioid overdose (22) at [facility] ED  SOCIAL DETERMINANTS OF HEALTH (SDOH): Disruption Of Family By Separation And Divorce, Unemployment, Unspecified, Food Insecurity Sheltered Homelessness Other Specified Lack Of Adequate Food Homelessness Unspecified  QUALITY FLAGS: High Utilization - Inpt/ER, Polypharmacy (Antipsychotic Two Plus  Psychotropics Four Plus Aripiprazole + Clonazepam + Clonidine Hcl + Doxepin Hcl + Gabapentin + Olanzapine + Quetiapine Fumarate), Medication Assisted Treatment (MAT) for Opioid Use Disorder (OUD) Not Sustained 6 Months  No Utilization of Pharmacotherapy for Alcohol Abuse or Dependence, Low Antipsychotic Medication Adherence - Schizophrenia  BEHAVIORAL HEALTH DIAGNOSES: Opioid related disorders Schizoaffective Disorder Other psychoactive substance related disorders Schizophrenia Borderline Personality Disorder Unspecified/Other Anxiety Disorder Adjustment Disorder Cocaine related disorders Alcohol related disorders Sedative, hypnotic, or anxiolytic related disorders Substance-Induced Depressive Disorder Tobacco related disorder Bipolar I Other stimulant related disorders Panic Disorder Unspecified/Other Bipolar Unspecified/Other Depressive Disorder Unspecified/Other Personality Disorder  BEHAVIORAL HEALTH MEDICATIONS: Clonazepam 1 mg PO TID (22 – 23), Zolpidem Tartrate 10 mg PO daily (18 – 19), Buprenorphine Hydrochloride Injection 0.1 mg (18 – 10/17/19), Clonidine HCl 0.3 mg PO TID (22 – 23), Gabapentin 100 mg PO TID (22 – 23), Doxepin HCL 50 mg PO BID (22 – 23), Aripiprazole 15 mg PO daily (10/03/22 – 23), Quetiapine Fumarate 100 mg PO daily (23 – 23), Olanzapine 5 mg PO daily (22 – 23), Olanzapine 20 mg PO daily (22 – 23), Amitriptyline HCL 50 mg Po daily (22), Hydroxyzine Pamoate 50 mg PO QID (12/15/21 – 22), lorazepam 1 mg PO QID (22), Nicotine Polacrilex  4 mg/27.5 daily (22)  OUTPATIENT: [facility] (21 – 23) for Opioid Dependence Uncomplicated, [facility] (10/21/21 – 23) for Schizoaffective Disorder, bipolar type  INPATIENT: MH x1 most recently at [facility] (22 – 22) for Other Psychoactive Substance Abuse with Psychoactive SIMD  ED: MH x2 most recently at [facility] (23-23) for Schizoaffective Disorder Unspecified, and KIM x3 most recently at [facility] (22) for Opioid Abuse Uncomplicated, [facility] () for Opioid Use Unspecified Uncomplicated, [facility] (22) for Poisoning by Unspecified Narcotics Accidental    Vital Signs Last 24 Hrs  T(C): 36.5 (11 Aug 2023 12:08), Max: 36.9 (11 Aug 2023 08:11)  T(F): 97.7 (11 Aug 2023 12:08), Max: 98.5 (11 Aug 2023 08:11)  HR: 68 (11 Aug 2023 12:08) (52 - 76)  BP: 119/70 (11 Aug 2023 12:08) (107/65 - 119/70)  BP(mean): 81 (11 Aug 2023 05:47) (81 - 84)  RR: 18 (11 Aug 2023 12:08) (17 - 18)  SpO2: 98% (11 Aug 2023 12:08) (96% - 99%)    Physical Exam:  Appearance & Skin: middle-aged lame in NAD, skin warm, dry, no rashes  Head & Neck; ENT: head normocephalic/atraumatic, EOMI, sclera anicteric, no conjunctival injection bilaterally, mucous membranes moist, nares patent  Chest: CTAB, no wheezes, rales, rhonchi, no increased work of breathing  Cardiac: regular rate and rhythm, normal S1, S2, no murmurs, rubs or gallops  Abdomen: soft, non-tender, non-distended  Neurological: AOx3, moving all four extremities against gravity  Extremities: no peripheral cyanosis or edema bilaterally    Mental Status Exam:  Appearance: middle-aged male, appears stated age, adequate hygiene/grooming  Behavior: calm, cooperative, fairly-related, fair eye contact, no PMA/PMR noted, no tremor, no abnormal movements, steady gait  Speech: low volume, decreased productivity and rate, normal tone  Mood: " I'm depressed. "  Affect: constricted range, mood congruent, stable, and minimally reactive  Thought Process: linear and goal directed without loosening of associations, tangential thought, thought blocking or illogical thought  Thought Content: Denies SI/HI/thoughts of harming self or others, impulse control WNL, patient expresses some paranoia, patient does not appear internally preoccupied over the course of the interview  Perception: Patient denies auditory and visual hallucinations, illusions  Cognition: Oriented to person, place and time, attn/conc/recent and remote memory/fund of knowledge WNL  Insight: Fair  Judgement: Fair    ASSESSMENT/PLAN:  52 year old male, single, undomiciled, unemployed with PPH HTN, HLD, DM2, CAD s/p stents and PPH schizoaffective disorder bipolar type, SIMD, Clust-B Personality Traits, Polysubstance Use Disorder (Tobacco, Alcohol, Cocaine, Stimulant), Opioid Use Disorder ([facility]), history of prior inpatient psychiatric hospitalizations most recently at [facility] (22 – 22) for SIMD, currently engaged in outpatient psychiatric treatment at [facility], patient-reported history of suicide attempts, no history of NSSIB, no history of violence, initially presenting to [facility] ED on the evening of 23 complaining of intermittent, exertional, non-radiating, substernal chest pain x 1 day and admitted to [facility] medicine for cardiac telemetry, now reporting suicidal ideation when informed of plan for discharge.      On evaluation, the patient is calm, cooperative, fairly-related, soft-spoken with fair eye contact, constricted affect and demonstrating good behavioral control and linear thought processes.  On initial interview by  Psychiatry, the patient reported low mood, SI with intent, a recent history of a suicide attempt and was unable to contract for safety.  The patient also reported a history of auditory hallucinations, although, he currently denies them.  Differential includes Schizoaffective Disorder vs SIMD vs Adjustment Disorder with Disturbance of Conduct.  The patient would benefit from a voluntary inpatient psychiatric hospitalization for safety, medication optimization, psychiatric stabilization, diagnostic clarification and coordination with outpatient providers.    Plan:    #Schizophrenia vs Schizoaffective Disorder vs SIMD vs Adjustment Disorder with Disturbance of Conduct:  ·  Admit Voluntarily to [facility]  ·  CO 1:1 not psychiatrically indicated at this time; q15 min checks  ·  Most recent qtc 479 ms  ·  Continue aripiprazole (Abilify) 15 mg PO daily  ·  Continue benztropine (Cogentin) 1 mg PO TID  ·  Continue clonazepam (Klonopin) 1 mg PO TID  ·  Continue Doxepin 100 mg PO BID  ·  Continue quetiapine (Seroquel) 100 mg PO qHs  ·  PRNs: Olanzapine 5 mg PO q6h prn, with escalation to IM if patient refusing PO and remains an imminent danger to self or others; if IM antipsychotic is administered, please perform follow-up ECG for QTc monitoring (<500).    #Opioid Use Disorder:   ·  Continue Methadone 60mg daily  ·  Plan for follow-up at [facility] upon discharge ([phone])    #Essential Hypertension:  ·  Continue amlodipine 10 mg PO daily    #Hyperlipidemia (HLD):   -Continue Atorvastatin 40mg PO daily  -Lipid Panel (08/10/23): Cholesterol 110, Triglycerides 66, HDL 49, Non-HDL 61, LDL 48     #Diabetes mellitus, type 2:  ·  Hgb A1c (08/10/23): 5.4  ·  Monitor FS Blood Glucose  ·  Continue Insulin Lispro Med dose sliding scale  (past hx on Metformin 500mg daily) per Cardiology recommendations    #CAD (coronary artery disease):  #Paroxysmal atrial fibrillation:  ·  Most recent ECG demonstrates sinus rhythm  ·  Continue ASA 81mg daily       Mata Jones  MRN: [id number]  : 1970    ACCEPTANCE NOTE    HPI:  52 year old male, single, undomiciled, unemployed with PMH HTN, HLD, DM2, CAD s/p stents and PPH schizoaffective disorder bipolar type, SIMD, Cluster-B Personality Traits, Polysubstance Use Disorder (Tobacco, Alcohol, Cocaine, Stimulant), Opioid Use Disorder (currently prescribed Methadone 60mg daily at [facility]), history of prior inpatient psychiatric hospitalizations most recently at [facility] (22 – 22) for SIMD, currently engaged in outpatient psychiatric treatment at [facility], patient-reported history of suicide attempts, no history of NSSIB, no history of violence, initially presenting to [facility] ED on the evening of 23 complaining of intermittent, exertional, non-radiating, substernal chest pain x 1 day and admitted to [facility] medicine for cardiac telemetry, now reporting suicidal ideation when informed of plan for discharge and admitted voluntarily to [address] for safety, medication optimization and psychiatric stabilization.    On evaluation, the patient is calm, cooperative, fairly-related, soft-spoken with fair eye contact, constricted affect and demonstrating good behavioral control and linear thought processes.  The patient states that he wanted to be admitted so that he can "get better and be on medications."  The patient then expresses that he heard that his sister and nephew were killed and it is imperative to get in contact with them.  When asked where he received such information, the patient only states "I heard it" but declines to elaborate further.  The patient reports recent SI and a history of AH but currently denies SI/HI, intent, plan and AVH.  The patient commits to safety while on [address].  The patient's medications are reviewed.  All questions and concerns addressed.      COLLATERAL:    Catherine Person (Sister): [phone]    PSYCKES:  MEDICAID ID: [id number]  TX FOR SI: SI x11 (First Date 2012) most recently at [facility] (22), Treatment for suicide attempt & Self-Inflicted Poisoning (2017) at [facility], history of opioid overdose (22) at [facility] ED  SOCIAL DETERMINANTS OF HEALTH (SDOH): Disruption Of Family By Separation And Divorce, Unemployment, Unspecified, Food Insecurity Sheltered Homelessness Other Specified Lack Of Adequate Food Homelessness Unspecified  QUALITY FLAGS: High Utilization - Inpt/ER, Polypharmacy (Antipsychotic Two Plus  Psychotropics Four Plus Aripiprazole + Clonazepam + Clonidine Hcl + Doxepin Hcl + Gabapentin + Olanzapine + Quetiapine Fumarate), Medication Assisted Treatment (MAT) for Opioid Use Disorder (OUD) Not Sustained 6 Months  No Utilization of Pharmacotherapy for Alcohol Abuse or Dependence, Low Antipsychotic Medication Adherence - Schizophrenia  BEHAVIORAL HEALTH DIAGNOSES: Opioid related disorders Schizoaffective Disorder Other psychoactive substance related disorders Schizophrenia Borderline Personality Disorder Unspecified/Other Anxiety Disorder Adjustment Disorder Cocaine related disorders Alcohol related disorders Sedative, hypnotic, or anxiolytic related disorders Substance-Induced Depressive Disorder Tobacco related disorder Bipolar I Other stimulant related disorders Panic Disorder Unspecified/Other Bipolar Unspecified/Other Depressive Disorder Unspecified/Other Personality Disorder  BEHAVIORAL HEALTH MEDICATIONS: Clonazepam 1 mg PO TID (22 – 23), Zolpidem Tartrate 10 mg PO daily (18 – 19), Buprenorphine Hydrochloride Injection 0.1 mg (18 – 10/17/19), Clonidine HCl 0.3 mg PO TID (22 – 23), Gabapentin 100 mg PO TID (22 – 23), Doxepin HCL 50 mg PO BID (22 – 23), Aripiprazole 15 mg PO daily (10/03/22 – 23), Quetiapine Fumarate 100 mg PO daily (23 – 23), Olanzapine 5 mg PO daily (22 – 23), Olanzapine 20 mg PO daily (22 – 23), Amitriptyline HCL 50 mg Po daily (22), Hydroxyzine Pamoate 50 mg PO QID (12/15/21 – 22), lorazepam 1 mg PO QID (22), Nicotine Polacrilex  4 mg/27.5 daily (22)  OUTPATIENT: [facility] (21 – 23) for Opioid Dependence Uncomplicated, [facility] (10/21/21 – 23) for Schizoaffective Disorder, bipolar type  INPATIENT: MH x1 most recently at [facility] (22 – 22) for Other Psychoactive Substance Abuse with Psychoactive SIMD  ED: MH x2 most recently at [facility] (23-23) for Schizoaffective Disorder Unspecified, and KIM x3 most recently at [facility] (22) for Opioid Abuse Uncomplicated, [facility] () for Opioid Use Unspecified Uncomplicated, [facility] (22) for Poisoning by Unspecified Narcotics Accidental    Vital Signs Last 24 Hrs  T(C): 36.5 (11 Aug 2023 12:08), Max: 36.9 (11 Aug 2023 08:11)  T(F): 97.7 (11 Aug 2023 12:08), Max: 98.5 (11 Aug 2023 08:11)  HR: 68 (11 Aug 2023 12:08) (52 - 76)  BP: 119/70 (11 Aug 2023 12:08) (107/65 - 119/70)  BP(mean): 81 (11 Aug 2023 05:47) (81 - 84)  RR: 18 (11 Aug 2023 12:08) (17 - 18)  SpO2: 98% (11 Aug 2023 12:08) (96% - 99%)    Physical Exam:  Appearance & Skin: middle-aged lame in NAD, skin warm, dry, no rashes  Head & Neck; ENT: head normocephalic/atraumatic, EOMI, sclera anicteric, no conjunctival injection bilaterally, mucous membranes moist, nares patent  Chest: CTAB, no wheezes, rales, rhonchi, no increased work of breathing  Cardiac: regular rate and rhythm, normal S1, S2, no murmurs, rubs or gallops  Abdomen: soft, non-tender, non-distended  Neurological: AOx3, moving all four extremities against gravity  Extremities: no peripheral cyanosis or edema bilaterally    Mental Status Exam:  Appearance: middle-aged male, appears stated age, adequate hygiene/grooming  Behavior: calm, cooperative, fairly-related, fair eye contact, no PMA/PMR noted, no tremor, no abnormal movements, steady gait  Speech: low volume, decreased productivity and rate, normal tone  Mood: " I'm depressed. "  Affect: constricted range, mood congruent, stable, and minimally reactive  Thought Process: linear and goal directed without loosening of associations, tangential thought, thought blocking or illogical thought  Thought Content: Denies SI/HI/thoughts of harming self or others, impulse control WNL, patient expresses some paranoia, patient does not appear internally preoccupied over the course of the interview  Perception: Patient denies auditory and visual hallucinations, illusions  Cognition: Oriented to person, place and time, attn/conc/recent and remote memory/fund of knowledge WNL  Insight: Fair  Judgement: Fair    ASSESSMENT/PLAN:  52 year old male, single, undomiciled, unemployed with PMH HTN, HLD, DM2, CAD s/p stents and PPH schizoaffective disorder bipolar type, SIMD, Cluster-B Personality Traits, Polysubstance Use Disorder (Tobacco, Alcohol, Cocaine, Stimulant), Opioid Use Disorder (currently prescribed [facility]), history of prior inpatient psychiatric hospitalizations most recently at [facility] (22 – 22) for SIMD, currently engaged in outpatient psychiatric treatment at [facility], patient-reported history of suicide attempts, no history of NSSIB, no history of violence, initially presenting to [facility] ED on the evening of 23 complaining of intermittent, exertional, non-radiating, substernal chest pain x 1 day and admitted to [facility] medicine for cardiac telemetry, now reporting suicidal ideation when informed of plan for discharge and admitted voluntarily to [facility] [address] for safety, medication optimization and psychiatric stabilization.    On evaluation, the patient is calm, cooperative, fairly-related, soft-spoken with fair eye contact, constricted affect and demonstrating good behavioral control and linear thought processes.  On initial interview by  Psychiatry, the patient reported low mood, SI with intent, a recent history of a suicide attempt and was unable to contract for safety.  The patient also reported a history of auditory hallucinations, although, he currently denies them.  Differential includes Schizoaffective Disorder vs SIMD vs Adjustment Disorder with Disturbance of Conduct.  The patient would benefit from a voluntary inpatient psychiatric hospitalization for safety, medication optimization, psychiatric stabilization, diagnostic clarification and coordination with outpatient providers.    Plan:    #Schizophrenia vs Schizoaffective Disorder vs SIMD vs Adjustment Disorder with Disturbance of Conduct:  ·  Admit Voluntarily to [facility]  ·  CO 1:1 not psychiatrically indicated at this time; q15 min checks  ·  Most recent qtc 479 ms  ·  Continue aripiprazole (Abilify) 15 mg PO daily  ·  Continue benztropine (Cogentin) 1 mg PO TID  ·  Continue clonazepam (Klonopin) 1 mg PO TID  ·  Continue Doxepin 100 mg PO BID  ·  Continue quetiapine (Seroquel) 100 mg PO qHs  ·  PRNs: Olanzapine 5 mg PO q6h prn, with escalation to IM if patient refusing PO and remains an imminent danger to self or others; if IM antipsychotic is administered, please perform follow-up ECG for QTc monitoring (<500).    #Opioid Use Disorder:   ·  Continue Methadone 60mg daily  ·  Plan for follow-up at [facility] upon discharge ([phone])    #Essential Hypertension:  ·  Continue amlodipine 10 mg PO daily    #Hyperlipidemia (HLD):   -Continue Atorvastatin 40mg PO daily  -Lipid Panel (08/10/23): Cholesterol 110, Triglycerides 66, HDL 49, Non-HDL 61, LDL 48     #Diabetes mellitus, type 2:  ·  Hgb A1c (08/10/23): 5.4  ·  Monitor FS Blood Glucose  ·  Continue Insulin Lispro Med dose sliding scale  (past hx on Metformin 500mg daily) per Cardiology recommendations    #CAD (coronary artery disease):  #Paroxysmal atrial fibrillation:  ·  Most recent ECG demonstrates sinus rhythm  ·  Continue ASA 81mg daily       Mata Jones  MRN: [id number]  : 1970    ACCEPTANCE NOTE    HPI:  52 year old male, single, undomiciled, unemployed with PMH HTN, HLD, DM2, CAD s/p stents and PPH schizoaffective disorder bipolar type, SIMD, Cluster-B Personality Traits, Polysubstance Use Disorder (Tobacco, Alcohol, Cocaine, Stimulant), Opioid Use Disorder (currently prescribed Methadone 60mg daily at [facility]), history of prior inpatient psychiatric hospitalizations most recently at [facility] (22 – 22) for SIMD, currently engaged in outpatient psychiatric treatment at [facility], patient-reported history of suicide attempts, no history of NSSIB, no history of violence, initially presenting to [facility] ED on the evening of 23 complaining of intermittent, exertional, non-radiating, substernal chest pain x 1 day and admitted to [facility] medicine for cardiac telemetry, now reporting suicidal ideation when informed of plan for discharge and admitted voluntarily to [address] for safety, medication optimization and psychiatric stabilization.    On evaluation, the patient is calm, cooperative, fairly-related, soft-spoken with fair eye contact, constricted affect and demonstrating good behavioral control and linear thought processes.  The patient states that he wanted to be admitted so that he can "get better and be on medications."  The patient then expresses that he heard that his sister and nephew were killed and it is imperative to get in contact with them.  When asked where he received such information, the patient only states "I heard it" but declines to elaborate further.  The patient reports recent SI and a history of AH but currently denies SI/HI, intent, plan and AVH.  The patient commits to safety while on [address].  The patient's medications are reviewed.  All questions and concerns addressed.      COLLATERAL:    Catherine Person (Sister): [phone]    PSYCKES:  MEDICAID ID: [id number]  TX FOR SI: SI x11 (First Date 2012) most recently at [facility] (22), Treatment for suicide attempt & Self-Inflicted Poisoning (2017) at [facility], history of opioid overdose (22) at [facility]  SOCIAL DETERMINANTS OF HEALTH (SDOH): Disruption Of Family By Separation And Divorce, Unemployment, Unspecified, Food Insecurity Sheltered Homelessness Other Specified Lack Of Adequate Food Homelessness Unspecified  QUALITY FLAGS: High Utilization - Inpt/ER, Polypharmacy (Antipsychotic Two Plus  Psychotropics Four Plus Aripiprazole + Clonazepam + Clonidine Hcl + Doxepin Hcl + Gabapentin + Olanzapine + Quetiapine Fumarate), Medication Assisted Treatment (MAT) for Opioid Use Disorder (OUD) Not Sustained 6 Months  No Utilization of Pharmacotherapy for Alcohol Abuse or Dependence, Low Antipsychotic Medication Adherence - Schizophrenia  BEHAVIORAL HEALTH DIAGNOSES: Opioid related disorders Schizoaffective Disorder Other psychoactive substance related disorders Schizophrenia Borderline Personality Disorder Unspecified/Other Anxiety Disorder Adjustment Disorder Cocaine related disorders Alcohol related disorders Sedative, hypnotic, or anxiolytic related disorders Substance-Induced Depressive Disorder Tobacco related disorder Bipolar I Other stimulant related disorders Panic Disorder Unspecified/Other Bipolar Unspecified/Other Depressive Disorder Unspecified/Other Personality Disorder  BEHAVIORAL HEALTH MEDICATIONS: Clonazepam 1 mg PO TID (22 – 23), Zolpidem Tartrate 10 mg PO daily (18 – 19), Buprenorphine Hydrochloride Injection 0.1 mg (18 – 10/17/19), Clonidine HCl 0.3 mg PO TID (22 – 23), Gabapentin 100 mg PO TID (22 – 23), Doxepin HCL 50 mg PO BID (22 – 23), Aripiprazole 15 mg PO daily (10/03/22 – 23), Quetiapine Fumarate 100 mg PO daily (23 – 23), Olanzapine 5 mg PO daily (22 – 23), Olanzapine 20 mg PO daily (22 – 23), Amitriptyline HCL 50 mg Po daily (22), Hydroxyzine Pamoate 50 mg PO QID (12/15/21 – 22), lorazepam 1 mg PO QID (22), Nicotine Polacrilex  4 mg/27.5 daily (22)  OUTPATIENT: [facility] (21 – 23) for Opioid Dependence Uncomplicated, [facility] (10/21/21 – 23) for Schizoaffective Disorder, bipolar type  INPATIENT: MH x1 most recently at [facility] (22 – 22) for Other Psychoactive Substance Abuse with Psychoactive SIMD  ED: MH x2 most recently at [facility] (23-23) for Schizoaffective Disorder Unspecified, and KIM x3 most recently at [facility] (22) for Opioid Abuse Uncomplicated, [facility] () for Opioid Use Unspecified Uncomplicated, [facility] (22) for Poisoning by Unspecified Narcotics Accidental    Vital Signs Last 24 Hrs  T(C): 36.5 (11 Aug 2023 12:08), Max: 36.9 (11 Aug 2023 08:11)  T(F): 97.7 (11 Aug 2023 12:08), Max: 98.5 (11 Aug 2023 08:11)  HR: 68 (11 Aug 2023 12:08) (52 - 76)  BP: 119/70 (11 Aug 2023 12:08) (107/65 - 119/70)  BP(mean): 81 (11 Aug 2023 05:47) (81 - 84)  RR: 18 (11 Aug 2023 12:08) (17 - 18)  SpO2: 98% (11 Aug 2023 12:08) (96% - 99%)    Physical Exam:  Appearance & Skin: middle-aged lame in NAD, skin warm, dry, no rashes  Head & Neck; ENT: head normocephalic/atraumatic, EOMI, sclera anicteric, no conjunctival injection bilaterally, mucous membranes moist, nares patent  Chest: CTAB, no wheezes, rales, rhonchi, no increased work of breathing  Cardiac: regular rate and rhythm, normal S1, S2, no murmurs, rubs or gallops  Abdomen: soft, non-tender, non-distended  Neurological: AOx3, moving all four extremities against gravity  Extremities: no peripheral cyanosis or edema bilaterally    Mental Status Exam:  Appearance: middle-aged male, appears stated age, adequate hygiene/grooming  Behavior: calm, cooperative, fairly-related, fair eye contact, no PMA/PMR noted, no tremor, no abnormal movements, steady gait  Speech: low volume, decreased productivity and rate, normal tone  Mood: " I'm depressed. "  Affect: constricted range, mood congruent, stable, and minimally reactive  Thought Process: linear and goal directed without loosening of associations, tangential thought, thought blocking or illogical thought  Thought Content: Denies SI/HI/thoughts of harming self or others, impulse control WNL, patient expresses some paranoia, patient does not appear internally preoccupied over the course of the interview  Perception: Patient denies auditory and visual hallucinations, illusions  Cognition: Oriented to person, place and time, attn/conc/recent and remote memory/fund of knowledge WNL  Insight: Fair  Judgement: Fair    ASSESSMENT/PLAN:  52 year old male, single, undomiciled, unemployed with PMH HTN, HLD, DM2, CAD s/p stents and PPH schizoaffective disorder bipolar type, SIMD, Cluster-B Personality Traits, Polysubstance Use Disorder (Tobacco, Alcohol, Cocaine, Stimulant), Opioid Use Disorder (currently prescribed Methadone [facility]mg daily at [facility]), history of prior inpatient psychiatric hospitalizations most recently at [facility] (22 – 22) for SIMD, currently engaged in outpatient psychiatric treatment at [facility], patient-reported history of suicide attempts, no history of NSSIB, no history of violence, initially presenting to [facility] ED on the evening of 23 complaining of intermittent, exertional, non-radiating, substernal chest pain x 1 day and admitted to [facility] medicine for cardiac telemetry, now reporting suicidal ideation when informed of plan for discharge and admitted voluntarily to [facility] for safety, medication optimization and psychiatric stabilization.    On evaluation, the patient is calm, cooperative, fairly-related, soft-spoken with fair eye contact, constricted affect and demonstrating good behavioral control and linear thought processes.  On initial interview by  Psychiatry, the patient reported low mood, SI with intent, a recent history of a suicide attempt and was unable to contract for safety.  The patient also reported a history of auditory hallucinations, although, he currently denies them.  Differential includes Schizoaffective Disorder vs SIMD vs Adjustment Disorder with Disturbance of Conduct.  The patient would benefit from a voluntary inpatient psychiatric hospitalization for safety, medication optimization, psychiatric stabilization, diagnostic clarification and coordination with outpatient providers.    Plan:    #Schizophrenia vs Schizoaffective Disorder vs SIMD vs Adjustment Disorder with Disturbance of Conduct:  ·  Admit Voluntarily to [facility]  ·  CO 1:1 not psychiatrically indicated at this time; q15 min checks  ·  Most recent qtc 479 ms  ·  Continue aripiprazole (Abilify) 15 mg PO daily  ·  Continue benztropine (Cogentin) 1 mg PO TID  ·  Continue clonazepam (Klonopin) 1 mg PO TID  ·  Continue Doxepin 100 mg PO BID  ·  Continue quetiapine (Seroquel) 100 mg PO qHs  ·  PRNs: Olanzapine 5 mg PO q6h prn, with escalation to IM if patient refusing PO and remains an imminent danger to self or others; if IM antipsychotic is administered, please perform follow-up ECG for QTc monitoring (<500).    #Polysubstance Use Disorder:  #Tobacco Use Disorder:  #Opioid Use Disorder:   ·  Continue Methadone 60mg daily for opioid dependence  ·  Plan for follow-up at [facility] upon discharge ([phone]) for management of Opioid Use Disorder  ·  start nicotine 21mg/24h patch transdermal daily for NRT    #Essential Hypertension:  ·  Continue amlodipine 10 mg PO daily    #Hyperlipidemia (HLD):   -Continue Atorvastatin 40mg PO daily  -Lipid Panel (08/10/23): Cholesterol 110, Triglycerides 66, HDL 49, Non-HDL 61, LDL 48     #Diabetes mellitus, type 2:  ·  Hgb A1c (08/10/23): 5.4  ·  Monitor FS Blood Glucose  ·  Continue Insulin Lispro Med dose sliding scale  (past hx on Metformin 500mg daily) per Cardiology recommendations    #CAD (coronary artery disease):  #Paroxysmal atrial fibrillation:  ·  Most recent ECG demonstrates sinus rhythm  ·  Continue ASA 81mg daily

## 2023-08-11 NOTE — DISCHARGE NOTE NURSING/CASE MANAGEMENT/SOCIAL WORK - PATIENT PORTAL LINK FT
You can access the FollowMyHealth Patient Portal offered by [facility] by registering at the following website: [email or web address]. By joining [facility]’s FollowMyHealth portal, you will also be able to view your health information using other applications (apps) compatible with our system.

## 2023-08-11 NOTE — BH PATIENT PROFILE - FUNCTIONAL ASSESSMENT - BASIC MOBILITY 6.
4-calculated by average/Not able to assess (calculate score using [facility] averaging method)

## 2023-08-11 NOTE — DISCHARGE NOTE NURSING/CASE MANAGEMENT/SOCIAL WORK - NSDCVIVACCINE_GEN_ALL_CORE_FT
Tdap; 26-Feb-2016 23:27; Yoselin Gardner (ELIZABETH); [employer]; [id number]; IntraMuscular; Deltoid Right.; 0.5 milliLiter(s); VIS (VIS Published: 09-May-2013, VIS Presented: 26-Feb-2016);

## 2023-08-11 NOTE — BH CONSULTATION LIAISON ASSESSMENT NOTE - SUMMARY
Patient was seen and evaluated this morning. Chart was reviewed and no acute events were noted. No PRN medications were administered overnight. Upon approach, patient is lying in bed comfortably. Patient is alert and oriented and engages with interviewer. Patient is cooperative and answers most questions appropriately. Patient complains of current suicidal ideation with intent and denies any homicidal ideation. Patient denies any persecutory delusions and admits to auditory and visual hallucinations. Given the patients significant history of SA, current SI with intent and lack of housing a safe discharge plan can not be completed at this time.     Plan:  -Patient would possibly benefit from inpatient psychiatry admission for safety, medication management, and symptom remission, pending re-eval  -Admit to inpatient psychiatry on 9.13 legal status (voluntary) once medically cleared  -will check Psyches and discuss with Inpatient Psych team,     #Severe agitation/aggression  -For severe agitation not responding to behavioral intervention, may give haldol 5 mg po q6h prn, with escalation to IM if patient refusing PO and remains an imminent danger to self or others.   -If IM antipsychotic is administered, please perform follow-up ECG for QTc monitoring (<500).     Patient was seen and evaluated this morning. Chart was reviewed and no acute events were noted. No PRN medications were administered overnight. Upon approach, patient is lying in bed comfortably. Patient is alert and oriented and engages with interviewer. Patient is cooperative and answers most questions appropriately. Patient complains of current suicidal ideation with intent and denies any homicidal ideation. Patient denies any persecutory delusions and admits to auditory and visual hallucinations. Given the patients significant history of SA, current SI with intent and lack of housing a safe discharge plan can not be completed at this time. Differential includes Schizoaffective disorder which he has a previous diagnosis of an current presentation is consistent with this diagnosis patient has low mood unclear if it is a depressive episode due to schizoaffective disorder vs substance induced mood disorder vs secondary gain due to unstable housing. [facility] and requires hospitalization for stabilization of mood and collateral from [facility] [place].     Plan:  -Admit to inpatient psychiatry on 9.13 legal status (voluntary) once medically cleared  -continue abilify 15 mg, seroquel 100 mg at night, benztropine 1 mg TID, Clonzapam 1 mg TID, Methadone 60 mg daily  -continue to follow cardiology recommendations regarding medical diagnoses: Cont with Norvasc 10 mg and Lipitor 40      #Severe agitation/aggression  -For severe agitation not responding to behavioral intervention, may give haldol 5 mg po q6h prn, with escalation to IM if patient refusing PO and remains an imminent danger to self or others.   -If IM antipsychotic is administered, please perform follow-up ECG for QTc monitoring (<500).     Patient was seen and evaluated this morning. Chart was reviewed and no acute events were noted. No PRN medications were administered overnight. Upon approach, patient is lying in bed comfortably. Patient is alert and oriented and engages with interviewer. Patient is cooperative and answers most questions appropriately. Patient complains of current suicidal ideation with intent and denies any homicidal ideation. Patient denies any persecutory delusions and admits to auditory and visual hallucinations. Given the patients significant history of SA, current SI with intent and lack of housing a safe discharge plan can not be completed at this time. Differential includes Schizoaffective disorder which he has a previous diagnosis of an current presentation is consistent with this diagnosis patient has low mood unclear if it is a depressive episode due to schizoaffective disorder vs substance induced mood disorder vs secondary gain due to unstable housing. He receives treatment at [facility] and requires hospitalization for stabilization of mood and collateral from [facility].     Plan:  -Admit to inpatient psychiatry on 9.13 legal status (voluntary) once medically cleared  -continue abilify 15 mg, seroquel 100 mg at night, benztropine 1 mg TID, Clonzapam 1 mg TID, Methadone 60 mg daily  -continue to follow cardiology recommendations regarding medical diagnoses: Cont with Norvasc 10 mg and Lipitor 40      #Severe agitation/aggression  -For severe agitation not responding to behavioral intervention, may give olanzapine 5 mg po q6h prn, with escalation to IM if patient refusing PO and remains an imminent danger to self or others.   -If IM antipsychotic is administered, please perform follow-up ECG for QTc monitoring (<500).

## 2023-08-11 NOTE — BH PATIENT PROFILE - HOME MEDICATIONS
amLODIPine 10 mg oral tablet , 1 tab(s) orally once a day  clonazePAM 1 mg oral tablet , 1 tab(s) orally 3 times a day  QUEtiapine 100 mg oral tablet , 1 tab(s) orally once a day (at bedtime)  ARIPiprazole 15 mg oral tablet , 1 tab(s) orally once a day  aspirin 81 mg oral delayed release tablet , 1 tab(s) orally once a day  benztropine 1 mg oral tablet , 1 tab(s) orally 3 times a day  atorvastatin 40 mg oral tablet , 1 tab(s) orally once a day (at bedtime)  methadone 10 mg oral tablet , 6 tab(s) orally once a day   No

## 2023-08-12 VITALS
DIASTOLIC BLOOD PRESSURE: 87 MMHG | HEART RATE: 73 BPM | SYSTOLIC BLOOD PRESSURE: 147 MMHG | RESPIRATION RATE: 18 BRPM | TEMPERATURE: 98 F | OXYGEN SATURATION: 100 %

## 2023-08-12 DIAGNOSIS — F11.90 OPIOID USE, UNSPECIFIED, UNCOMPLICATED: ICD-10-CM

## 2023-08-12 DIAGNOSIS — F19.90 OTHER PSYCHOACTIVE SUBSTANCE USE, UNSPECIFIED, UNCOMPLICATED: ICD-10-CM

## 2023-08-12 LAB
GLUCOSE BLDC GLUCOMTR-MCNC: 106 MG/DL — HIGH (ref 70–99)
GLUCOSE BLDC GLUCOMTR-MCNC: 87 MG/DL — SIGNIFICANT CHANGE UP (ref 70–99)
GLUCOSE BLDC GLUCOMTR-MCNC: 92 MG/DL — SIGNIFICANT CHANGE UP (ref 70–99)

## 2023-08-12 PROCEDURE — 90792 PSYCH DIAG EVAL W/MED SRVCS: CPT

## 2023-08-12 RX ORDER — ACETAMINOPHEN 500 MG
650 TABLET ORAL EVERY 6 HOURS
Refills: 0 | Status: DISCONTINUED | OUTPATIENT
Start: 2023-08-12 | End: 2023-08-13

## 2023-08-12 RX ADMIN — Medication 1 MILLIGRAM(S): at 21:24

## 2023-08-12 RX ADMIN — Medication 650 MILLIGRAM(S): at 19:25

## 2023-08-12 RX ADMIN — METHADONE HYDROCHLORIDE 60 MILLIGRAM(S): 40 TABLET ORAL at 10:32

## 2023-08-12 RX ADMIN — OLANZAPINE 5 MILLIGRAM(S): 15 TABLET, FILM COATED ORAL at 21:23

## 2023-08-12 RX ADMIN — Medication 81 MILLIGRAM(S): at 09:55

## 2023-08-12 RX ADMIN — ATORVASTATIN CALCIUM 40 MILLIGRAM(S): 80 TABLET, FILM COATED ORAL at 21:23

## 2023-08-12 RX ADMIN — Medication 1 MILLIGRAM(S): at 14:50

## 2023-08-12 RX ADMIN — Medication 1 PATCH: at 09:54

## 2023-08-12 RX ADMIN — Medication 650 MILLIGRAM(S): at 19:50

## 2023-08-12 RX ADMIN — Medication 1 MILLIGRAM(S): at 09:54

## 2023-08-12 RX ADMIN — QUETIAPINE FUMARATE 100 MILLIGRAM(S): 200 TABLET, FILM COATED ORAL at 21:23

## 2023-08-12 RX ADMIN — ARIPIPRAZOLE 15 MILLIGRAM(S): 15 TABLET ORAL at 09:54

## 2023-08-12 RX ADMIN — AMLODIPINE BESYLATE 10 MILLIGRAM(S): 2.5 TABLET ORAL at 09:55

## 2023-08-12 RX ADMIN — Medication 1 PATCH: at 19:09

## 2023-08-12 NOTE — BH INPATIENT PSYCHIATRY ASSESSMENT NOTE - NSBHMETABOLIC_PSY_ALL_CORE_FT
BMI: BMI (kg/m2): 28.4 (08-10-23 @ 00:22)  HbA1c: A1C with Estimated Average Glucose Result: 5.4 % (08-10-23 @ 05:30)    Glucose: POCT Blood Glucose.: 87 mg/dL (08-12-23 @ 07:05)    BP: 147/87 (08-12-23 @ 10:29) (147/87 - 147/87)  Lipid Panel: Date/Time: 08-10-23 @ 05:30  Cholesterol, Serum: 110  Direct LDL: --  HDL Cholesterol, Serum: 49  Total Cholesterol/HDL Ration Measurement: --  Triglycerides, Serum: 66   BMI: BMI (kg/m2): 28.4 (08-10-23 @ 00:22)  HbA1c: A1C with Estimated Average Glucose Result: 5.4 % (08-10-23 @ 05:30)    Glucose: POCT Blood Glucose.: 106 mg/dL (08-12-23 @ 17:05)    BP: 144/87 (08-12-23 @ 16:16) (144/87 - 147/87)  Lipid Panel: Date/Time: 08-10-23 @ 05:30  Cholesterol, Serum: 110  Direct LDL: --  HDL Cholesterol, Serum: 49  Total Cholesterol/HDL Ration Measurement: --  Triglycerides, Serum: 66
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
## 2023-08-12 NOTE — BH INPATIENT PSYCHIATRY ASSESSMENT NOTE - OTHER PAST PSYCHIATRIC HISTORY (INCLUDE DETAILS REGARDING ONSET, COURSE OF ILLNESS, INPATIENT/OUTPATIENT TREATMENT)
[place]:  MEDICAID ID: [id number]  [place] FOR SI: SI x11 (First Date 01/14/2012) most recently at [facility] (02/19/22), Treatment for suicide attempt & Self-Inflicted Poisoning (04/04/2017) at [facility], history of opioid overdose (01/13/22) at [facility] ED  SOCIAL DETERMINANTS OF HEALTH (SDOH): Disruption Of Family By Separation And Divorce, Unemployment, Unspecified, Food Insecurity Sheltered Homelessness Other Specified Lack Of Adequate Food Homelessness Unspecified  QUALITY FLAGS: High Utilization - Inpt/ER, Polypharmacy (Antipsychotic Two Plus  Psychotropics Four Plus Aripiprazole + Clonazepam + Clonidine Hcl + Doxepin Hcl + Gabapentin + Olanzapine + Quetiapine Fumarate), Medication Assisted Treatment (MAT) for Opioid Use Disorder (OUD) Not Sustained 6 Months  No Utilization of Pharmacotherapy for Alcohol Abuse or Dependence, Low Antipsychotic Medication Adherence - Schizophrenia  BEHAVIORAL HEALTH DIAGNOSES: Opioid related disorders Schizoaffective Disorder Other psychoactive substance related disorders Schizophrenia Borderline Personality Disorder Unspecified/Other Anxiety Disorder Adjustment Disorder Cocaine related disorders Alcohol related disorders Sedative, hypnotic, or anxiolytic related disorders Substance-Induced Depressive Disorder Tobacco related disorder Bipolar I Other stimulant related disorders Panic Disorder Unspecified/Other Bipolar Unspecified/Other Depressive Disorder Unspecified/Other Personality Disorder  BEHAVIORAL HEALTH MEDICATIONS: Clonazepam 1 mg PO TID (09/02/22 – 06/19/23), Zolpidem Tartrate 10 mg PO daily (08/23/18 – 12/04/19), Buprenorphine Hydrochloride Injection 0.1 mg (07/31/18 – 10/17/19), Clonidine HCl 0.3 mg PO TID (01/12/22 – 06/28/23), Gabapentin 100 mg PO TID (07/25/22 – 06/28/23), Doxepin HCL 50 mg PO BID (09/02/22 – 06/20/23), Aripiprazole 15 mg PO daily (10/03/22 – 06/16/23), Quetiapine Fumarate 100 mg PO daily (05/22/23 – 06/16/23), Olanzapine 5 mg PO daily (09/02/22 – 04/14/23), Olanzapine 20 mg PO daily (09/02/22 – 04/14/23), Amitriptyline HCL 50 mg Po daily (01/21/22), Hydroxyzine Pamoate 50 mg PO QID (12/15/21 – 01/14/22), lorazepam 1 mg PO QID (01/14/22), Nicotine Polacrilex  4 mg/27.5 daily (01/14/22)  OUTPATIENT: [facility] (11/01/21 – 07/13/23) for Opioid Dependence Uncomplicated, [facility] (10/21/21 – 06/16/23) for Schizoaffective Disorder, bipolar type  INPATIENT: MH x1 most recently at [facility] (02/21/22 – 02/24/22) for Other Psychoactive Substance Abuse with Psychoactive SIMD  ED: MH x2 most recently at [facility] (06/20/23-06/21/23) for Schizoaffective Disorder Unspecified, and KIM x3 most recently at [facility] (11/04/22) for Opioid Abuse Uncomplicated, [facility] (02/29/22) for Opioid Use Unspecified Uncomplicated, [facility] (01/13/22) for Poisoning by Unspecified Narcotics Accidental
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
## 2023-08-12 NOTE — BH INPATIENT PSYCHIATRY ASSESSMENT NOTE - DESCRIPTION
The patient was born in [place] and raised by his mother along with five siblings.  He states that he went to [employer] until the 7th grade when his mother then moved the family to [place] to help their aging maternal grandfather.  He states that he lived in [place] for 11 years.  He states that he dropped out of high school but achieved a GED.  He states that he moved back to [place] when he was 21 years old.  He reports that he is unemployed and receives SSI/SSD.  He was previously domiciled with a friend for approximately one month prior to this hospitalization.  He identifies as An[employer]ist.

## 2023-08-12 NOTE — BH INPATIENT PSYCHIATRY ASSESSMENT NOTE - NSBHASSESSSUMMFT_PSY_ALL_CORE
Mata Jones  MRN: [id number]  : 1970    ACCEPTANCE NOTE    HPI:  52 year old male, single, undomiciled, unemployed with PMH HTN, HLD, DM2, CAD s/p stents and PPH schizoaffective disorder bipolar type, SIMD, Cluster-B Personality Traits, Polysubstance Use Disorder (Tobacco, Alcohol, Cocaine, Stimulant), Opioid Use Disorder (currently prescribed Methadone 60mg daily at [facility]), history of prior inpatient psychiatric hospitalizations most recently at [facility] (22 – 22) for SIMD, currently engaged in outpatient psychiatric treatment at [facility], patient-reported history of suicide attempts, no history of NSSIB, no history of violence, initially presenting to [facility] ED on the evening of 23 complaining of intermittent, exertional, non-radiating, substernal chest pain x 1 day and admitted to [facility] medicine for cardiac telemetry, now reporting suicidal ideation when informed of plan for discharge and admitted voluntarily to [address] for safety, medication optimization and psychiatric stabilization.    On evaluation, the patient is calm, cooperative, fairly-related, soft-spoken with fair eye contact, constricted affect and demonstrating good behavioral control and linear thought processes.  The patient states that he wanted to be admitted so that he can "get better and be on medications."  The patient then expresses that he heard that his sister and nephew were killed and it is imperative to get in contact with them.  When asked where he received such information, the patient only states "I heard it" but declines to elaborate further.  The patient reports recent SI and a history of AH but currently denies SI/HI, intent, plan and AVH.  The patient commits to safety while on [address].  The patient's medications are reviewed.  All questions and concerns addressed.      COLLATERAL:    Catherine Person (Sister): [phone]    PSYCKES:  MEDICAID ID: [id number]  TX FOR SI: SI x11 (First Date 2012) most recently at [facility] ED (22), Treatment for suicide attempt & Self-Inflicted Poisoning (2017) at [facility] ED, history of opioid overdose (22) at [facility] ED  SOCIAL DETERMINANTS OF HEALTH (SDOH): Disruption Of Family By Separation And Divorce, Unemployment, Unspecified, Food Insecurity Sheltered Homelessness Other Specified Lack Of Adequate Food Homelessness Unspecified  QUALITY FLAGS: High Utilization - Inpt/ER, Polypharmacy (Antipsychotic Two Plus  Psychotropics Four Plus Aripiprazole + Clonazepam + Clonidine Hcl + Doxepin Hcl + Gabapentin + Olanzapine + Quetiapine Fumarate), Medication Assisted Treatment (MAT) for Opioid Use Disorder (OUD) Not Sustained 6 Months  No Utilization of Pharmacotherapy for Alcohol Abuse or Dependence, Low Antipsychotic Medication Adherence - Schizophrenia  BEHAVIORAL HEALTH DIAGNOSES: Opioid related disorders Schizoaffective Disorder Other psychoactive substance related disorders Schizophrenia Borderline Personality Disorder Unspecified/Other Anxiety Disorder Adjustment Disorder Cocaine related disorders Alcohol related disorders Sedative, hypnotic, or anxiolytic related disorders Substance-Induced Depressive Disorder Tobacco related disorder Bipolar I Other stimulant related disorders Panic Disorder Unspecified/Other Bipolar Unspecified/Other Depressive Disorder Unspecified/Other Personality Disorder  BEHAVIORAL HEALTH MEDICATIONS: Clonazepam 1 mg PO TID (22 – 23), Zolpidem Tartrate 10 mg PO daily (18 – 19), Buprenorphine Hydrochloride Injection 0.1 mg (18 – 10/17/19), Clonidine HCl 0.3 mg PO TID (22 – 23), Gabapentin 100 mg PO TID (22 – 23), Doxepin HCL 50 mg PO BID (22 – 23), Aripiprazole 15 mg PO daily (10/03/22 – 23), Quetiapine Fumarate 100 mg PO daily (23 – 23), Olanzapine 5 mg PO daily (22 – 23), Olanzapine 20 mg PO daily (22 – 23), Amitriptyline HCL 50 mg Po daily (22), Hydroxyzine Pamoate 50 mg PO QID (12/15/21 – 22), lorazepam 1 mg PO QID (22), Nicotine Polacrilex  4 mg/27.5 daily (22)  OUTPATIENT: [facility] (21 – 23) for Opioid Dependence Uncomplicated, [facility] (10/21/21 – 23) for Schizoaffective Disorder, bipolar type  INPATIENT: MH x1 most recently at [facility] (22 – 22) for Other Psychoactive Substance Abuse with Psychoactive SIMD  ED: MH x2 most recently at [facility] (23-23) for Schizoaffective Disorder Unspecified, and KIM x3 most recently at [facility] (22) for Opioid Abuse Uncomplicated, [facility] () for Opioid Use Unspecified Uncomplicated, [facility] (22) for Poisoning by Unspecified Narcotics Accidental    Vital Signs Last 24 Hrs  T(C): 36.5 (11 Aug 2023 12:08), Max: 36.9 (11 Aug 2023 08:11)  T(F): 97.7 (11 Aug 2023 12:08), Max: 98.5 (11 Aug 2023 08:11)  HR: 68 (11 Aug 2023 12:08) (52 - 76)  BP: 119/70 (11 Aug 2023 12:08) (107/65 - 119/70)  BP(mean): 81 (11 Aug 2023 05:47) (81 - 84)  RR: 18 (11 Aug 2023 12:08) (17 - 18)  SpO2: 98% (11 Aug 2023 12:08) (96% - 99%)    Physical Exam:  Appearance & Skin: middle-aged lame in NAD, skin warm, dry, no rashes  Head & Neck; ENT: head normocephalic/atraumatic, EOMI, sclera anicteric, no conjunctival injection bilaterally, mucous membranes moist, nares patent  Chest: CTAB, no wheezes, rales, rhonchi, no increased work of breathing  Cardiac: regular rate and rhythm, normal S1, S2, no murmurs, rubs or gallops  Abdomen: soft, non-tender, non-distended  Neurological: AOx3, moving all four extremities against gravity  Extremities: no peripheral cyanosis or edema bilaterally    Mental Status Exam:  Appearance: middle-aged male, appears stated age, adequate hygiene/grooming  Behavior: calm, cooperative, fairly-related, fair eye contact, no PMA/PMR noted, no tremor, no abnormal movements, steady gait  Speech: low volume, decreased productivity and rate, normal tone  Mood: " I'm depressed. "  Affect: constricted range, mood congruent, stable, and minimally reactive  Thought Process: linear and goal directed without loosening of associations, tangential thought, thought blocking or illogical thought  Thought Content: Denies SI/HI/thoughts of harming self or others, impulse control WNL, patient expresses some paranoia, patient does not appear internally preoccupied over the course of the interview  Perception: Patient denies auditory and visual hallucinations, illusions  Cognition: Oriented to person, place and time, attn/conc/recent and remote memory/fund of knowledge WNL  Insight: Fair  Judgement: Fair    ASSESSMENT/PLAN:  52 year old male, single, undomiciled, unemployed with PMH HTN, HLD, DM2, CAD s/p stents and PPH schizoaffective disorder bipolar type, SIMD, Cluster-B Personality Traits, Polysubstance Use Disorder (Tobacco, Alcohol, Cocaine, Stimulant), Opioid Use Disorder (currently prescribed Methadone 60mg daily at [facility]), history of prior inpatient psychiatric hospitalizations most recently at [facility] (22 – 22) for SIMD, currently engaged in outpatient psychiatric treatment at [facility], patient-reported history of suicide attempts, no history of NSSIB, no history of violence, initially presenting to [facility] ED on the evening of 23 complaining of intermittent, exertional, non-radiating, substernal chest pain x 1 day and admitted to [facility] medicine for cardiac telemetry, now reporting suicidal ideation when informed of plan for discharge and admitted voluntarily to [facility] [address] for safety, medication optimization and psychiatric stabilization.    On evaluation, the patient is calm, cooperative, fairly-related, soft-spoken with fair eye contact, constricted affect and demonstrating good behavioral control and linear thought processes.  On initial interview by  Psychiatry, the patient reported low mood, SI with intent, a recent history of a suicide attempt and was unable to contract for safety.  The patient also reported a history of auditory hallucinations, although, he currently denies them.  Differential includes Schizoaffective Disorder vs SIMD vs Adjustment Disorder with Disturbance of Conduct.  The patient would benefit from a voluntary inpatient psychiatric hospitalization for safety, medication optimization, psychiatric stabilization, diagnostic clarification and coordination with outpatient providers.    Plan:    #Schizophrenia vs Schizoaffective Disorder vs SIMD vs Adjustment Disorder with Disturbance of Conduct:  ·  Admit Voluntarily to [facility] [address]  ·  CO 1:1 not psychiatrically indicated at this time; q15 min checks  ·  Most recent qtc 479 ms  ·  Continue aripiprazole (Abilify) 15 mg PO daily  ·  Continue benztropine (Cogentin) 1 mg PO TID  ·  Continue clonazepam (Klonopin) 1 mg PO TID  ·  Continue Doxepin 100 mg PO BID  ·  Continue quetiapine (Seroquel) 100 mg PO qHs  ·  PRNs: Olanzapine 5 mg PO q6h prn, with escalation to IM if patient refusing PO and remains an imminent danger to self or others; if IM antipsychotic is administered, please perform follow-up ECG for QTc monitoring (<500).    #Polysubstance Use Disorder:  #Tobacco Use Disorder:  #Opioid Use Disorder:   ·  Continue Methadone 60mg daily for opioid dependence  ·  Plan for follow-up at [facility] upon discharge ([phone]) for management of Opioid Use Disorder  ·  start nicotine 21mg/24h patch transdermal daily for NRT    #Essential Hypertension:  ·  Continue amlodipine 10 mg PO daily    #Hyperlipidemia (HLD):   -Continue Atorvastatin 40mg PO daily  -Lipid Panel (08/10/23): Cholesterol 110, Triglycerides 66, HDL 49, Non-HDL 61, LDL 48     #Diabetes mellitus, type 2:  ·  Hgb A1c (08/10/23): 5.4  ·  Monitor FS Blood Glucose  ·  Continue Insulin Lispro Med dose sliding scale  (past hx on Metformin 500mg daily) per Cardiology recommendations    #CAD (coronary artery disease):  #Paroxysmal atrial fibrillation:  ·  Most recent ECG demonstrates sinus rhythm  ·  Continue ASA 81mg daily   52 year old male, single, undomiciled, unemployed with PMH HTN, HLD, DM2, CAD s/p stents and PPH schizoaffective disorder bipolar type, SIMD, Cluster-B Personality Traits, Polysubstance Use Disorder (Tobacco, Alcohol, Cocaine, Stimulant), Opioid Use Disorder (currently prescribed Methadone 60mg daily at [facility]), history of prior inpatient psychiatric hospitalizations most recently at [facility] (02/21/22 – 02/24/22) for SIMD, currently engaged in outpatient psychiatric treatment at [facility], patient-reported history of suicide attempts, no history of NSSIB, no history of violence, initially presenting to [facility] ED on the evening of 08/09/23 complaining of intermittent, exertional, non-radiating, substernal chest pain x 1 day and admitted to [facility] medicine for cardiac telemetry, now reporting suicidal ideation when informed of plan for discharge and admitted voluntarily to [address] for safety, medication optimization and psychiatric stabilization.    On evaluation, the patient is calm, cooperative, fairly-related, soft-spoken with fair eye contact, constricted affect and demonstrating good behavioral control and linear thought processes.  On initial interview by  Psychiatry, the patient reported low mood, SI with intent, a recent history of a suicide attempt and was unable to contract for safety.  The patient also reported a history of auditory hallucinations, although, he currently denies them.  Differential includes Schizoaffective Disorder vs SIMD vs Adjustment Disorder with Disturbance of Conduct.  The patient would benefit from a voluntary inpatient psychiatric hospitalization for safety, medication optimization, psychiatric stabilization, diagnostic clarification and coordination with outpatient providers.      Plan:    #Schizophrenia vs Schizoaffective Disorder vs SIMD vs Adjustment Disorder with Disturbance of Conduct:  ·  Admit Voluntarily to [address]  ·  CO 1:1 not psychiatrically indicated at this time; q15 min checks  ·  Most recent qtc 479 ms  ·  Continue aripiprazole (Abilify) 15 mg PO daily  ·  Continue benztropine (Cogentin) 1 mg PO TID  ·  Continue clonazepam (Klonopin) 1 mg PO TID  ·  Continue Doxepin 100 mg PO BID  ·  Continue quetiapine (Seroquel) 100 mg PO qHs  ·  PRNs: Olanzapine 5 mg PO q6h prn, with escalation to IM if patient refusing PO and remains an imminent danger to self or others; if IM antipsychotic is administered, please perform follow-up ECG for QTc monitoring (<500).  · Obtain collateral from Catherine Person (Sister): [phone]    #Polysubstance Use Disorder:  #Tobacco Use Disorder:  #Opioid Use Disorder:   ·  Continue Methadone 60mg daily for opioid dependence  ·  Plan for follow-up at [facility] upon discharge ([phone]) for management of Opioid Use Disorder  ·  start nicotine 21mg/24h patch transdermal daily for NRT    #Essential Hypertension:  ·  Continue amlodipine 10 mg PO daily    #Hyperlipidemia (HLD):   -Continue Atorvastatin 40mg PO daily  -Lipid Panel (08/10/23): Cholesterol 110, Triglycerides 66, HDL 49, Non-HDL 61, LDL 48     #Diabetes mellitus, type 2:  ·  Hgb A1c (08/10/23): 5.4  ·  Monitor FS Blood Glucose  ·  Continue Insulin Lispro Med dose sliding scale  (past hx on Metformin 500mg daily) per Cardiology recommendations    #CAD (coronary artery disease):  #Paroxysmal atrial fibrillation:  ·  Most recent ECG demonstrates sinus rhythm  ·  Continue ASA 81mg daily   52 year old male, single, undomiciled, unemployed with PMH HTN, HLD, DM2, CAD s/p stents and PPH schizoaffective disorder bipolar type, SIMD, Cluster-B Personality Traits, Polysubstance Use Disorder (Tobacco, Alcohol, Cocaine, Stimulant), Opioid Use Disorder (currently prescribed Methadone 60mg daily at [facility]), history of prior inpatient psychiatric hospitalizations most recently at [facility] (02/21/22 – 02/24/22) for SIMD, currently engaged in outpatient psychiatric treatment at [facility], patient-reported history of suicide attempts, no history of NSSIB, no history of violence, initially presenting to [facility] ED on the evening of 08/09/23 complaining of intermittent, exertional, non-radiating, substernal chest pain x 1 day and admitted to [facility] medicine for cardiac telemetry, now reporting suicidal ideation when informed of plan for discharge and admitted voluntarily to [address] for safety, medication optimization and psychiatric stabilization.    On evaluation, the patient is calm, cooperative, fairly-related, soft-spoken with fair eye contact, constricted affect and demonstrating good behavioral control and linear thought processes.  On initial interview by  Psychiatry, the patient reported low mood, SI with intent, a recent history of a suicide attempt and was unable to contract for safety.  The patient also reported a history of auditory hallucinations, although, he currently denies them.  Differential includes Schizoaffective Disorder vs SIMD vs Adjustment Disorder with Disturbance of Conduct.  The patient would benefit from a voluntary inpatient psychiatric hospitalization for safety, medication optimization, psychiatric stabilization, diagnostic clarification and coordination with outpatient providers.      Plan:    #Schizophrenia vs Schizoaffective Disorder vs SIMD vs Adjustment Disorder with Disturbance of Conduct:  ·  Admit Voluntarily to [address]  ·  CO 1:1 not psychiatrically indicated at this time; q15 min checks  ·  Most recent qtc 479 ms  ·  Continue aripiprazole (Abilify) 15 mg PO daily  ·  Continue benztropine (Cogentin) 1 mg PO TID  ·  Continue clonazepam (Klonopin) 1 mg PO TID  ·  Hold home Doxepin 100 mg PO BID given non-formulary  ·  Continue quetiapine (Seroquel) 100 mg PO qHs  ·  PRNs: Olanzapine 5 mg PO q6h prn, with escalation to IM if patient refusing PO and remains an imminent danger to self or others; if IM antipsychotic is administered, please perform follow-up ECG for QTc monitoring (<500).  · Obtain collateral from Catherine Person (Sister): [phone]    #Polysubstance Use Disorder:  #Tobacco Use Disorder:  #Opioid Use Disorder:   ·  Continue Methadone 60mg daily for opioid dependence  ·  Plan for follow-up at [facility] upon discharge ([phone]) for management of Opioid Use Disorder  ·  start nicotine 21mg/24h patch transdermal daily for NRT    #Essential Hypertension:  ·  Continue amlodipine 10 mg PO daily    #Hyperlipidemia (HLD):   -Continue Atorvastatin 40mg PO daily  -Lipid Panel (08/10/23): Cholesterol 110, Triglycerides 66, HDL 49, Non-HDL 61, LDL 48     #Diabetes mellitus, type 2:  ·  Hgb A1c (08/10/23): 5.4  ·  Monitor FS Blood Glucose  ·  Continue Insulin Lispro Med dose sliding scale  (past hx on Metformin 500mg daily) per Cardiology recommendations    #CAD (coronary artery disease):  #Paroxysmal atrial fibrillation:  ·  Most recent ECG demonstrates sinus rhythm  ·  Continue ASA 81mg daily

## 2023-08-12 NOTE — BH INPATIENT PSYCHIATRY ASSESSMENT NOTE - VETERAN
December 21, 2020        Ulises Friedman  [address] [facility] [address]  Phone: [phone]  Fax: [phone]             [facility]  [address]  [facility] [address]  Phone: [phone]   Patient: Alan Fairbanks Jr.   MR Number: [id number]   YOB: 1950   Date of Visit: 12/21/2020       Dear Dr. Ulises Friedman    Thank you for referring Alan Fairbanks to me for evaluation. Attached you will find relevant portions of my assessment and plan of care.    If you have questions, please do not hesitate to call me. I look forward to following Alan Fairbanks along with you.    Sincerely,    Tomy Daly MD    Enclosure    If you would like to receive this communication electronically, please contact [email or web address] or [phone] to request [employer] access.    [employer] is a tool which provides read-only access to select patient information with whom you have a relationship. Its easy to use and provides real time access to review your patients record including encounter summaries, notes, results, and demographic information.    If you feel you have received this communication in error or would no longer like to receive these types of communications, please e-mail [email or web address]       
No

## 2023-08-12 NOTE — BH INPATIENT PSYCHIATRY ASSESSMENT NOTE - CURRENT MEDICATION
MEDICATIONS  (STANDING):  amLODIPine   Tablet 10 milliGRAM(s) Oral daily  ARIPiprazole 15 milliGRAM(s) Oral daily  aspirin  chewable 81 milliGRAM(s) Oral daily  atorvastatin 40 milliGRAM(s) Oral at bedtime  benztropine 1 milliGRAM(s) Oral three times a day  clonazePAM  Tablet 1 milliGRAM(s) Oral three times a day  insulin lispro (ADMELOG) corrective regimen sliding scale   SubCutaneous three times a day before meals  methadone    Tablet 60 milliGRAM(s) Oral daily  nicotine - 21 mG/24Hr(s) Patch 1 Patch Transdermal daily  QUEtiapine 100 milliGRAM(s) Oral at bedtime    MEDICATIONS  (PRN):  dextrose Oral Gel 15 Gram(s) Oral once PRN Blood Glucose LESS THAN 70 milliGRAM(s)/deciliter  OLANZapine 5 milliGRAM(s) Oral every 6 hours PRN agitation   MEDICATIONS  (STANDING):  amLODIPine   Tablet 10 milliGRAM(s) Oral daily  ARIPiprazole 15 milliGRAM(s) Oral daily  aspirin  chewable 81 milliGRAM(s) Oral daily  atorvastatin 40 milliGRAM(s) Oral at bedtime  benztropine 1 milliGRAM(s) Oral three times a day  clonazePAM  Tablet 1 milliGRAM(s) Oral three times a day  insulin lispro (ADMELOG) corrective regimen sliding scale   SubCutaneous three times a day before meals  methadone    Tablet 60 milliGRAM(s) Oral daily  nicotine - 21 mG/24Hr(s) Patch 1 Patch Transdermal daily  QUEtiapine 100 milliGRAM(s) Oral at bedtime    MEDICATIONS  (PRN):  acetaminophen     Tablet .. 650 milliGRAM(s) Oral every 6 hours PRN Mild Pain (1 - 3), Moderate Pain (4 - 6)  dextrose Oral Gel 15 Gram(s) Oral once PRN Blood Glucose LESS THAN 70 milliGRAM(s)/deciliter  OLANZapine 5 milliGRAM(s) Oral every 6 hours PRN agitation

## 2023-08-12 NOTE — BH INPATIENT PSYCHIATRY ASSESSMENT NOTE - NSBHSAALC_PSY_A_CORE FT
The patient reports that he first consumed alcohol at the age of 13.  He denies current alcohol use and states that he drinks "socially."   Rosanna

## 2023-08-12 NOTE — BH INPATIENT PSYCHIATRY ASSESSMENT NOTE - NSBHSATHC_PSY_A_CORE FT
The patient first tried cannabis "a long time ago" and reports current abstinence because it makes him feel paranoid.

## 2023-08-12 NOTE — BH INPATIENT PSYCHIATRY ASSESSMENT NOTE - PAST PSYCHOTROPIC MEDICATION
Clonazepam 1 mg PO TID (09/02/22 – 06/19/23), Zolpidem Tartrate 10 mg PO daily (08/23/18 – 12/04/19), Buprenorphine Hydrochloride Injection 0.1 mg (07/31/18 – 10/17/19), Clonidine HCl 0.3 mg PO TID (01/12/22 – 06/28/23), Gabapentin 100 mg PO TID (07/25/22 – 06/28/23), Doxepin HCL 50 mg PO BID (09/02/22 – 06/20/23), Aripiprazole 15 mg PO daily (10/03/22 – 06/16/23), Quetiapine Fumarate 100 mg PO daily (05/22/23 – 06/16/23), Olanzapine 5 mg PO daily (09/02/22 – 04/14/23), Olanzapine 20 mg PO daily (09/02/22 – 04/14/23), Amitriptyline HCL 50 mg Po daily (01/21/22), Hydroxyzine Pamoate 50 mg PO QID (12/15/21 – 01/14/22), lorazepam 1 mg PO QID (01/14/22), Nicotine Polacrilex  4 mg/27.5 daily (01/14/22)

## 2023-08-12 NOTE — BH INPATIENT PSYCHIATRY ASSESSMENT NOTE - NSBHPHYSICALEXAM_PSY_ALL_CORE
Physical Exam:  Appearance & Skin: middle-aged lame in NAD, skin warm, dry, no rashes  Head & Neck; ENT: head normocephalic/atraumatic, EOMI, sclera anicteric, no conjunctival injection bilaterally, mucous membranes moist, nares patent  Chest: CTAB, no wheezes, rales, rhonchi, no increased work of breathing  Cardiac: regular rate and rhythm, normal S1, S2, no murmurs, rubs or gallops  Abdomen: soft, non-tender, non-distended  Neurological: AOx3, moving all four extremities against gravity  Extremities: no peripheral cyanosis or edema bilaterally

## 2023-08-12 NOTE — BH INPATIENT PSYCHIATRY ASSESSMENT NOTE - NSCOMMENTSUICRISKFACT_PSY_ALL_CORE
Elevated chronic risk for suicide/self-harm given male gender, history of suicide attempts, substance use, schizoaffective disorder diagnosis, cluster-B personality traits, history of trauma, poor coping mechanisms and poor insight which is acutely elevated given worsening SI and psychotic symptoms and hopelessness.

## 2023-08-12 NOTE — BH INPATIENT PSYCHIATRY ASSESSMENT NOTE - NSICDXBHTERTIARYDX_PSY_ALL_CORE
R/O Substance induced mood disorder   F19.94  R/O Adjustment disorder with disturbance of conduct   F43.24

## 2023-08-12 NOTE — BH INPATIENT PSYCHIATRY ASSESSMENT NOTE - NSSUICPROTFACT_PSY_ALL_CORE
Identifies reasons for living/Cultural, spiritual and/or moral attitudes against suicide/Buddhism beliefs

## 2023-08-12 NOTE — BH INPATIENT PSYCHIATRY ASSESSMENT NOTE - MSE UNSTRUCTURED FT
Appearance: middle-aged male, appears stated age, adequate hygiene/grooming  Behavior: calm, cooperative, fairly-related, fair eye contact, no PMA/PMR noted, no tremor, no abnormal movements, steady gait  Speech: low volume, decreased productivity and rate, normal tone  Mood: " I'm depressed. "  Affect: constricted range, mood congruent, stable, and minimally reactive  Thought Process: linear and goal directed without loosening of associations, tangential thought, thought blocking or illogical thought  Thought Content: Denies SI/HI/thoughts of harming self or others, impulse control WNL, patient expresses some paranoia, patient does not appear internally preoccupied over the course of the interview  Perception: Patient denies auditory and visual hallucinations, illusions  Cognition: Oriented to person, place and time, attn/conc/recent and remote memory/fund of knowledge WNL  Insight: Fair  Judgement: Fair

## 2023-08-12 NOTE — BH INPATIENT PSYCHIATRY ASSESSMENT NOTE - HPI (INCLUDE ILLNESS QUALITY, SEVERITY, DURATION, TIMING, CONTEXT, MODIFYING FACTORS, ASSOCIATED SIGNS AND SYMPTOMS)
52 year old male, single, undomiciled, unemployed with PMH HTN, HLD, DM2, CAD s/p stents and PPH schizoaffective disorder bipolar type, SIMD, Cluster-B Personality Traits, Polysubstance Use Disorder (Tobacco, Alcohol, Cocaine, Stimulant), Opioid Use Disorder (currently prescribed [facility]), history of prior inpatient psychiatric hospitalizations most recently at [facility] (02/21/22 – 02/24/22) for SIMD, currently engaged in outpatient psychiatric treatment at [facility], patient-reported history of suicide attempts, no history of NSSIB, no history of violence, initially presenting to [facility] ED on the evening of 08/09/23 complaining of intermittent, exertional, non-radiating, substernal chest pain x 1 day and admitted to [facility] medicine for cardiac telemetry, now reporting suicidal ideation when informed of plan for discharge and admitted voluntarily to [address] for safety, medication optimization and psychiatric stabilization.    On evaluation, the patient is calm, cooperative, fairly-related, soft-spoken with fair eye contact, constricted affect and demonstrating good behavioral control and linear thought processes.  The patient states that he wanted to be admitted so that he can "get better and be on medications."  The patient then expresses that he heard that his sister and nephew were killed and it is imperative to get in contact with them.  When asked where he received such information, the patient only states "I heard it" but declines to elaborate further.  The patient reports recent SI and a history of AH but currently denies SI/HI, intent, plan and AVH.  The patient commits to safety while on [address].  The patient's medications are reviewed.  All questions and concerns addressed.      COLLATERAL:    Catherine Person (Sister): [phone]   52 year old male, single, undomiciled, unemployed with PMH HTN, HLD, DM2, CAD s/p stents and PPH schizoaffective disorder bipolar type, SIMD, Cluster-B Personality Traits, Polysubstance Use Disorder (Tobacco, Alcohol, Cocaine, Stimulant), Opioid Use Disorder (currently prescribed [facility]), history of prior inpatient psychiatric hospitalizations most recently at [facility] (02/21/22 – 02/24/22) for SIMD, currently engaged in outpatient psychiatric treatment at [facility], patient-reported history of suicide attempts, no history of NSSIB, no history of violence, history of trauma, initially presenting to [facility] ED on the evening of 08/09/23 complaining of intermittent, exertional, non-radiating, substernal chest pain x 1 day and admitted to [facility] medicine for cardiac telemetry, now reporting suicidal ideation when informed of plan for discharge and admitted voluntarily to [facility] for safety, medication optimization and psychiatric stabilization.    On evaluation, the patient is calm, cooperative, fairly-related, soft-spoken with fair eye contact, constricted affect and demonstrating good behavioral control and linear thought processes.  The patient states that he wanted to be admitted so that he can "work on getting better."  The patient then expresses that he heard that his sister and nephew were killed prior to his admission but states that he has since confirmed that they are safe.  The patient states that he has been depressed because he does not "know what [he] is doing."  He states that he wants to move out of [facility] to make a better life for himself.  He states that, prior to his admission, he "tried to climb a fence near [address] to jump" but states that he could not climb it because of the difficulty.  He denies auditory hallucinations but reports seeing "shadows" at night.  He also reports vague paranoia stating "I always feel people are after me but I don't know who."  The patient states that he hopes that social work can "get [him] into a [facility] program [facility]."  The patient currently denies SI/HI, intent, plan and AVH.  The patient commits to safety while on [facility].  All questions and concerns addressed.      COLLATERAL:    Catherine Person (Sister): [phone]

## 2023-08-12 NOTE — BH INPATIENT PSYCHIATRY ASSESSMENT NOTE - NSBHCHARTREVIEWLAB_PSY_A_CORE FT
CBC:            11.8   5.97  )-----------( 192      ( 08-11-23 @ 06:14 )             35.6     Chem:         ( 08-11-23 @ 06:14 )  136  |  101  |  14  ----------------------------<  82  3.6   |  29  |  0.81

## 2023-08-12 NOTE — BH INPATIENT PSYCHIATRY ASSESSMENT NOTE - RISK ASSESSMENT
The patient is at an elevated chronic risk for suicide/self-harm given male gender, history of suicide attempts, substance use, schizoaffective disorder diagnosis, cluster-B personality traits, history of trauma, poor coping mechanisms and poor insight which is acutely elevated given worsening SI and psychotic symptoms and hopelessness.  Protective factors that mitigate this risk is a capacity to advocate for self, future-oriented, [employer] beliefs (Evangel[employer]), identifies reasons for living, and help-seeking.

## 2023-08-12 NOTE — BH INPATIENT PSYCHIATRY ASSESSMENT NOTE - NSBHCHARTREVIEWVS_PSY_A_CORE FT
Vital Signs Last 24 Hrs  T(C): 36.8 (08-12-23 @ 10:29), Max: 36.8 (08-12-23 @ 10:29)  T(F): 98.3 (08-12-23 @ 10:29), Max: 98.3 (08-12-23 @ 10:29)  HR: 73 (08-12-23 @ 10:29) (68 - 73)  BP: 147/87 (08-12-23 @ 10:29) (119/70 - 147/87)  BP(mean): --  RR: 18 (08-12-23 @ 10:29) (18 - 18)  SpO2: 100% (08-12-23 @ 10:29) (98% - 100%)     Vital Signs Last 24 Hrs  T(C): 36.9 (08-12-23 @ 16:16), Max: 36.9 (08-12-23 @ 16:16)  T(F): 98.5 (08-12-23 @ 16:16), Max: 98.5 (08-12-23 @ 16:16)  HR: 62 (08-12-23 @ 16:16) (62 - 73)  BP: 144/87 (08-12-23 @ 16:16) (144/87 - 147/87)  BP(mean): --  RR: 18 (08-12-23 @ 16:16) (18 - 18)  SpO2: 99% (08-12-23 @ 16:16) (99% - 100%)

## 2023-08-13 LAB
GLUCOSE BLDC GLUCOMTR-MCNC: 136 MG/DL — HIGH (ref 70–99)
GLUCOSE BLDC GLUCOMTR-MCNC: 154 MG/DL — HIGH (ref 70–99)
GLUCOSE BLDC GLUCOMTR-MCNC: 79 MG/DL — SIGNIFICANT CHANGE UP (ref 70–99)
GLUCOSE BLDC GLUCOMTR-MCNC: 95 MG/DL — SIGNIFICANT CHANGE UP (ref 70–99)

## 2023-08-13 RX ORDER — ACETAMINOPHEN 500 MG
650 TABLET ORAL EVERY 6 HOURS
Refills: 0 | Status: DISCONTINUED | OUTPATIENT
Start: 2023-08-13 | End: 2023-08-21

## 2023-08-13 RX ORDER — IBUPROFEN 200 MG
600 TABLET ORAL EVERY 6 HOURS
Refills: 0 | Status: DISCONTINUED | OUTPATIENT
Start: 2023-08-13 | End: 2023-08-21

## 2023-08-13 RX ADMIN — Medication 1 PATCH: at 10:00

## 2023-08-13 RX ADMIN — Medication 1 PATCH: at 18:02

## 2023-08-13 RX ADMIN — Medication 1 MILLIGRAM(S): at 13:31

## 2023-08-13 RX ADMIN — Medication 1 MILLIGRAM(S): at 10:11

## 2023-08-13 RX ADMIN — AMLODIPINE BESYLATE 10 MILLIGRAM(S): 2.5 TABLET ORAL at 10:11

## 2023-08-13 RX ADMIN — Medication 650 MILLIGRAM(S): at 10:10

## 2023-08-13 RX ADMIN — QUETIAPINE FUMARATE 100 MILLIGRAM(S): 200 TABLET, FILM COATED ORAL at 21:08

## 2023-08-13 RX ADMIN — OLANZAPINE 5 MILLIGRAM(S): 15 TABLET, FILM COATED ORAL at 21:08

## 2023-08-13 RX ADMIN — Medication 81 MILLIGRAM(S): at 10:11

## 2023-08-13 RX ADMIN — ATORVASTATIN CALCIUM 40 MILLIGRAM(S): 80 TABLET, FILM COATED ORAL at 21:08

## 2023-08-13 RX ADMIN — Medication 1 MILLIGRAM(S): at 21:08

## 2023-08-13 RX ADMIN — Medication 1 PATCH: at 10:10

## 2023-08-13 RX ADMIN — METHADONE HYDROCHLORIDE 60 MILLIGRAM(S): 40 TABLET ORAL at 10:11

## 2023-08-13 RX ADMIN — Medication 650 MILLIGRAM(S): at 16:51

## 2023-08-13 RX ADMIN — Medication 650 MILLIGRAM(S): at 11:10

## 2023-08-13 RX ADMIN — Medication 650 MILLIGRAM(S): at 17:51

## 2023-08-13 RX ADMIN — ARIPIPRAZOLE 15 MILLIGRAM(S): 15 TABLET ORAL at 10:10

## 2023-08-13 NOTE — BH INPATIENT PSYCHIATRY PROGRESS NOTE - MSE UNSTRUCTURED FT
Appearance: middle-aged male, appears stated age, adequate hygiene/grooming  Behavior: calm, cooperative, fairly-related, fair eye contact, no PMA/PMR noted, no tremor, no abnormal movements  Speech: low volume, decreased productivity and rate, normal tone  Mood: " I'm ok. "  Affect: constricted stable, and somewhat reactive  Thought Process: linear and goal directed without loosening of associations, tangential thought, thought blocking or illogical thought  Thought Content: Reports ongoing vague SI (pt declines to elaborate), denies HI/thoughts of harming self or others, impulse control WNL, patient does not appear internally preoccupied over the course of the interview  Perception: Patient denies auditory and visual hallucinations, illusions  Cognition: Oriented to person, place and time, attn/conc/recent and remote memory/fund of knowledge WNL  Insight: Improving  Judgement: Improving

## 2023-08-14 LAB
GLUCOSE BLDC GLUCOMTR-MCNC: 106 MG/DL — HIGH (ref 70–99)
GLUCOSE BLDC GLUCOMTR-MCNC: 110 MG/DL — HIGH (ref 70–99)
GLUCOSE BLDC GLUCOMTR-MCNC: 128 MG/DL — HIGH (ref 70–99)
GLUCOSE BLDC GLUCOMTR-MCNC: 95 MG/DL — SIGNIFICANT CHANGE UP (ref 70–99)
LEVETIRACETAM SERPL-MCNC: <2 UG/ML — LOW (ref 10–40)

## 2023-08-14 PROCEDURE — 99231 SBSQ HOSP IP/OBS SF/LOW 25: CPT

## 2023-08-14 RX ORDER — SENNA PLUS 8.6 MG/1
1 TABLET ORAL AT BEDTIME
Refills: 0 | Status: DISCONTINUED | OUTPATIENT
Start: 2023-08-14 | End: 2023-08-21

## 2023-08-14 RX ADMIN — Medication 1 PATCH: at 18:15

## 2023-08-14 RX ADMIN — Medication 1 PATCH: at 10:00

## 2023-08-14 RX ADMIN — Medication 1 MILLIGRAM(S): at 10:02

## 2023-08-14 RX ADMIN — OLANZAPINE 5 MILLIGRAM(S): 15 TABLET, FILM COATED ORAL at 21:11

## 2023-08-14 RX ADMIN — OLANZAPINE 5 MILLIGRAM(S): 15 TABLET, FILM COATED ORAL at 05:55

## 2023-08-14 RX ADMIN — AMLODIPINE BESYLATE 10 MILLIGRAM(S): 2.5 TABLET ORAL at 10:03

## 2023-08-14 RX ADMIN — ATORVASTATIN CALCIUM 40 MILLIGRAM(S): 80 TABLET, FILM COATED ORAL at 21:09

## 2023-08-14 RX ADMIN — METHADONE HYDROCHLORIDE 60 MILLIGRAM(S): 40 TABLET ORAL at 10:03

## 2023-08-14 RX ADMIN — SENNA PLUS 1 TABLET(S): 8.6 TABLET ORAL at 21:10

## 2023-08-14 RX ADMIN — ARIPIPRAZOLE 15 MILLIGRAM(S): 15 TABLET ORAL at 10:02

## 2023-08-14 RX ADMIN — Medication 1 MILLIGRAM(S): at 21:09

## 2023-08-14 RX ADMIN — Medication 1 PATCH: at 10:05

## 2023-08-14 RX ADMIN — Medication 1 MILLIGRAM(S): at 14:22

## 2023-08-14 RX ADMIN — Medication 81 MILLIGRAM(S): at 10:02

## 2023-08-14 RX ADMIN — QUETIAPINE FUMARATE 100 MILLIGRAM(S): 200 TABLET, FILM COATED ORAL at 21:09

## 2023-08-14 NOTE — BH SOCIAL WORK INITIAL PSYCHOSOCIAL EVALUATION - NSBHSAALC_PSY_A_CORE FT
The patient reports that he first consumed alcohol at the age of 13.  He denies current alcohol use and states that he drinks "socially."

## 2023-08-14 NOTE — BH SOCIAL WORK INITIAL PSYCHOSOCIAL EVALUATION - OTHER PAST PSYCHIATRIC HISTORY (INCLUDE DETAILS REGARDING ONSET, COURSE OF ILLNESS, INPATIENT/OUTPATIENT TREATMENT)
52 year old male, single, undomiciled, unemployed with PPH schizoaffective disorder bipolar type, SIMD, Cluster-B Personality Traits, Polysubstance Use Disorder (Tobacco, Alcohol, Cocaine, Stimulant), Opioid Use Disorder (currently prescribed Methadone 60mg daily at [facility]), history of prior inpatient psychiatric hospitalizations most recently at [facility] (02/21/22 – 02/24/22) for SIMD, currently engaged in outpatient psychiatric treatment at [facility], patient-reported history of suicide attempts, no history of NSSIB, no history of violence, history of trauma, initially presenting to [facility] ED on the evening of 08/09/23 complaining of intermittent, exertional, non-radiating, substernal chest pain x 1 day and admitted to [facility] medicine for cardiac telemetry, now reporting suicidal ideation when informed of plan for discharge and admitted voluntarily to [facility] for safety, medication optimization and psychiatric stabilization    PSYCKES:  MEDICAID ID: [id number]  TX FOR SI: SI x11 (First Date 01/14/2012) most recently at [facility] ED (02/19/22), Treatment for suicide attempt & Self-Inflicted Poisoning (04/04/2017) at [facility] ED, history of opioid overdose (01/13/22) at [facility] ED  SOCIAL DETERMINANTS OF HEALTH (SDOH): Disruption Of Family By Separation And Divorce, Unemployment, Unspecified, Food Insecurity Sheltered Homelessness Other Specified Lack Of Adequate Food Homelessness Unspecified  QUALITY FLAGS: High Utilization - Inpt/ER, Polypharmacy (Antipsychotic Two Plus  Psychotropics Four Plus Aripiprazole + Clonazepam + Clonidine Hcl + Doxepin Hcl + Gabapentin + Olanzapine + Quetiapine Fumarate), Medication Assisted Treatment (MAT) for Opioid Use Disorder (OUD) Not Sustained 6 Months  No Utilization of Pharmacotherapy for Alcohol Abuse or Dependence, Low Antipsychotic Medication Adherence - Schizophrenia  BEHAVIORAL HEALTH DIAGNOSES: Opioid related disorders Schizoaffective Disorder Other psychoactive substance related disorders Schizophrenia Borderline Personality Disorder Unspecified/Other Anxiety Disorder Adjustment Disorder Cocaine related disorders Alcohol related disorders Sedative, hypnotic, or anxiolytic related disorders Substance-Induced Depressive Disorder Tobacco related disorder Bipolar I Other stimulant related disorders Panic Disorder Unspecified/Other Bipolar Unspecified/Other Depressive Disorder Unspecified/Other Personality Disorder  BEHAVIORAL HEALTH MEDICATIONS: Clonazepam 1 mg PO TID (09/02/22 – 06/19/23), Zolpidem Tartrate 10 mg PO daily (08/23/18 – 12/04/19), Buprenorphine Hydrochloride Injection 0.1 mg (07/31/18 – 10/17/19), Clonidine HCl 0.3 mg PO TID (01/12/22 – 06/28/23), Gabapentin 100 mg PO TID (07/25/22 – 06/28/23), Doxepin HCL 50 mg PO BID (09/02/22 – 06/20/23), Aripiprazole 15 mg PO daily (10/03/22 – 06/16/23), Quetiapine Fumarate 100 mg PO daily (05/22/23 – 06/16/23), Olanzapine 5 mg PO daily (09/02/22 – 04/14/23), Olanzapine 20 mg PO daily (09/02/22 – 04/14/23), Amitriptyline HCL 50 mg Po daily (01/21/22), Hydroxyzine Pamoate 50 mg PO QID (12/15/21 – 01/14/22), lorazepam 1 mg PO QID (01/14/22), Nicotine Polacrilex  4 mg/27.5 daily (01/14/22)  OUTPATIENT: [facility] (11/01/21 – 07/13/23) for Opioid Dependence Uncomplicated, [facility] (10/21/21 – 06/16/23) for Schizoaffective Disorder, bipolar type  INPATIENT: MH x1 most recently at [facility] (02/21/22 – 02/24/22) for Other Psychoactive Substance Abuse with Psychoactive SIMD  ED: MH x2 most recently at [facility] (06/20/23-06/21/23) for Schizoaffective Disorder Unspecified, and KIM x3 most recently at [facility] (11/04/22) for Opioid Abuse Uncomplicated, [facility] (02/29/22) for Opioid Use Unspecified Uncomplicated, [facility] (01/13/22) for Poisoning by Unspecified Narcotics Accidental

## 2023-08-14 NOTE — BH SOCIAL WORK INITIAL PSYCHOSOCIAL EVALUATION - NSBHHOUSECONTACTFT_PSY_ALL_CORE
[address] [place] [phone] / [phone] / Director: [email or web address]

## 2023-08-15 DIAGNOSIS — F11.20 OPIOID DEPENDENCE, UNCOMPLICATED: ICD-10-CM

## 2023-08-15 DIAGNOSIS — I20.0 UNSTABLE ANGINA: ICD-10-CM

## 2023-08-15 DIAGNOSIS — I10 ESSENTIAL (PRIMARY) HYPERTENSION: ICD-10-CM

## 2023-08-15 DIAGNOSIS — I48.91 UNSPECIFIED ATRIAL FIBRILLATION: ICD-10-CM

## 2023-08-15 DIAGNOSIS — I20.9 ANGINA PECTORIS, UNSPECIFIED: ICD-10-CM

## 2023-08-15 DIAGNOSIS — E11.9 TYPE 2 DIABETES MELLITUS WITHOUT COMPLICATIONS: ICD-10-CM

## 2023-08-15 DIAGNOSIS — E78.5 HYPERLIPIDEMIA, UNSPECIFIED: ICD-10-CM

## 2023-08-15 DIAGNOSIS — Z79.82 LONG TERM (CURRENT) USE OF ASPIRIN: ICD-10-CM

## 2023-08-15 DIAGNOSIS — F20.9 SCHIZOPHRENIA, UNSPECIFIED: ICD-10-CM

## 2023-08-15 DIAGNOSIS — G40.909 EPILEPSY, UNSPECIFIED, NOT INTRACTABLE, WITHOUT STATUS EPILEPTICUS: ICD-10-CM

## 2023-08-15 DIAGNOSIS — Z88.0 ALLERGY STATUS TO PENICILLIN: ICD-10-CM

## 2023-08-15 DIAGNOSIS — Z79.01 LONG TERM (CURRENT) USE OF ANTICOAGULANTS: ICD-10-CM

## 2023-08-15 LAB
GLUCOSE BLDC GLUCOMTR-MCNC: 100 MG/DL — HIGH (ref 70–99)
GLUCOSE BLDC GLUCOMTR-MCNC: 101 MG/DL — HIGH (ref 70–99)
GLUCOSE BLDC GLUCOMTR-MCNC: 146 MG/DL — HIGH (ref 70–99)
GLUCOSE BLDC GLUCOMTR-MCNC: 89 MG/DL — SIGNIFICANT CHANGE UP (ref 70–99)

## 2023-08-15 PROCEDURE — 99232 SBSQ HOSP IP/OBS MODERATE 35: CPT

## 2023-08-15 RX ORDER — CLONAZEPAM 1 MG
1 TABLET ORAL
Refills: 0 | Status: DISCONTINUED | OUTPATIENT
Start: 2023-08-15 | End: 2023-08-21

## 2023-08-15 RX ORDER — ARIPIPRAZOLE 15 MG/1
20 TABLET ORAL DAILY
Refills: 0 | Status: DISCONTINUED | OUTPATIENT
Start: 2023-08-15 | End: 2023-08-21

## 2023-08-15 RX ORDER — POLYETHYLENE GLYCOL 3350 17 G/17G
17 POWDER, FOR SOLUTION ORAL AT BEDTIME
Refills: 0 | Status: DISCONTINUED | OUTPATIENT
Start: 2023-08-15 | End: 2023-08-19

## 2023-08-15 RX ADMIN — Medication 81 MILLIGRAM(S): at 10:05

## 2023-08-15 RX ADMIN — Medication 1 PATCH: at 10:06

## 2023-08-15 RX ADMIN — SENNA PLUS 1 TABLET(S): 8.6 TABLET ORAL at 21:19

## 2023-08-15 RX ADMIN — Medication 1 MILLIGRAM(S): at 10:05

## 2023-08-15 RX ADMIN — Medication 600 MILLIGRAM(S): at 11:52

## 2023-08-15 RX ADMIN — Medication 1 PATCH: at 07:20

## 2023-08-15 RX ADMIN — Medication 1 MILLIGRAM(S): at 21:20

## 2023-08-15 RX ADMIN — ARIPIPRAZOLE 15 MILLIGRAM(S): 15 TABLET ORAL at 10:05

## 2023-08-15 RX ADMIN — Medication 600 MILLIGRAM(S): at 22:19

## 2023-08-15 RX ADMIN — Medication 1 MILLIGRAM(S): at 14:10

## 2023-08-15 RX ADMIN — Medication 1 PATCH: at 10:04

## 2023-08-15 RX ADMIN — Medication 600 MILLIGRAM(S): at 10:52

## 2023-08-15 RX ADMIN — Medication 600 MILLIGRAM(S): at 21:19

## 2023-08-15 RX ADMIN — QUETIAPINE FUMARATE 100 MILLIGRAM(S): 200 TABLET, FILM COATED ORAL at 21:19

## 2023-08-15 RX ADMIN — METHADONE HYDROCHLORIDE 60 MILLIGRAM(S): 40 TABLET ORAL at 10:05

## 2023-08-15 RX ADMIN — Medication 1 PATCH: at 18:04

## 2023-08-15 RX ADMIN — POLYETHYLENE GLYCOL 3350 17 GRAM(S): 17 POWDER, FOR SOLUTION ORAL at 21:20

## 2023-08-15 RX ADMIN — AMLODIPINE BESYLATE 10 MILLIGRAM(S): 2.5 TABLET ORAL at 10:05

## 2023-08-15 RX ADMIN — ATORVASTATIN CALCIUM 40 MILLIGRAM(S): 80 TABLET, FILM COATED ORAL at 21:20

## 2023-08-16 LAB
GLUCOSE BLDC GLUCOMTR-MCNC: 101 MG/DL — HIGH (ref 70–99)
GLUCOSE BLDC GLUCOMTR-MCNC: 110 MG/DL — HIGH (ref 70–99)
GLUCOSE BLDC GLUCOMTR-MCNC: 118 MG/DL — HIGH (ref 70–99)
GLUCOSE BLDC GLUCOMTR-MCNC: 92 MG/DL — SIGNIFICANT CHANGE UP (ref 70–99)

## 2023-08-16 PROCEDURE — 99232 SBSQ HOSP IP/OBS MODERATE 35: CPT

## 2023-08-16 PROCEDURE — 90834 PSYTX W PT 45 MINUTES: CPT

## 2023-08-16 RX ORDER — OLANZAPINE 15 MG/1
5 TABLET, FILM COATED ORAL EVERY 6 HOURS
Refills: 0 | Status: COMPLETED | OUTPATIENT
Start: 2023-08-16 | End: 2023-08-19

## 2023-08-16 RX ORDER — METHADONE HYDROCHLORIDE 40 MG/1
60 TABLET ORAL DAILY
Refills: 0 | Status: DISCONTINUED | OUTPATIENT
Start: 2023-08-16 | End: 2023-08-21

## 2023-08-16 RX ADMIN — AMLODIPINE BESYLATE 10 MILLIGRAM(S): 2.5 TABLET ORAL at 09:59

## 2023-08-16 RX ADMIN — Medication 1 MILLIGRAM(S): at 09:59

## 2023-08-16 RX ADMIN — Medication 650 MILLIGRAM(S): at 16:05

## 2023-08-16 RX ADMIN — Medication 650 MILLIGRAM(S): at 21:59

## 2023-08-16 RX ADMIN — SENNA PLUS 1 TABLET(S): 8.6 TABLET ORAL at 21:58

## 2023-08-16 RX ADMIN — Medication 81 MILLIGRAM(S): at 09:59

## 2023-08-16 RX ADMIN — Medication 600 MILLIGRAM(S): at 11:56

## 2023-08-16 RX ADMIN — Medication 650 MILLIGRAM(S): at 23:00

## 2023-08-16 RX ADMIN — Medication 1 PATCH: at 10:05

## 2023-08-16 RX ADMIN — Medication 1 MILLIGRAM(S): at 12:56

## 2023-08-16 RX ADMIN — Medication 1 PATCH: at 10:00

## 2023-08-16 RX ADMIN — ATORVASTATIN CALCIUM 40 MILLIGRAM(S): 80 TABLET, FILM COATED ORAL at 21:57

## 2023-08-16 RX ADMIN — Medication 600 MILLIGRAM(S): at 10:56

## 2023-08-16 RX ADMIN — ARIPIPRAZOLE 20 MILLIGRAM(S): 15 TABLET ORAL at 09:59

## 2023-08-16 RX ADMIN — Medication 1 MILLIGRAM(S): at 21:58

## 2023-08-16 RX ADMIN — OLANZAPINE 5 MILLIGRAM(S): 15 TABLET, FILM COATED ORAL at 00:49

## 2023-08-16 RX ADMIN — QUETIAPINE FUMARATE 100 MILLIGRAM(S): 200 TABLET, FILM COATED ORAL at 21:58

## 2023-08-16 RX ADMIN — Medication 650 MILLIGRAM(S): at 15:05

## 2023-08-16 RX ADMIN — METHADONE HYDROCHLORIDE 60 MILLIGRAM(S): 40 TABLET ORAL at 10:44

## 2023-08-16 RX ADMIN — Medication 1 MILLIGRAM(S): at 21:57

## 2023-08-16 NOTE — BH PSYCHOLOGY - CLINICIAN PSYCHOTHERAPY NOTE - TOKEN PULL-DIAGNOSIS
Primary Diagnosis:  Schizoaffective disorder [F25.9]        Problem Dx:   Opioid use disorder [F11.90]      Polysubstance use disorder [F19.90]

## 2023-08-16 NOTE — BH PSYCHOLOGY - CLINICIAN PSYCHOTHERAPY NOTE - NSBHPSYCHOLADDL_PSY_A_CORE
52 year old male, single, undomiciled, unemployed with PMH HTN, HLD, DM2, CAD s/p stents and PPH schizoaffective disorder bipolar type, SIMD, Cluster-B Personality Traits, Polysubstance Use Disorder (Tobacco, Alcohol, Cocaine, Stimulant), Opioid Use Disorder (currently prescribed Methadone 60mg daily at [facility]), history of prior inpatient psychiatric hospitalizations most recently at [facility] (02/21/22 – 02/24/22) for SIMD, currently engaged in outpatient psychiatric treatment at [facility], patient-reported history of suicide attempts, no history of NSSIB, no history of violence, history of trauma, initially presenting to [facility] ED on the evening of 08/09/23 complaining of intermittent, exertional, non-radiating, substernal chest pain x 1 day and admitted to [facility] medicine for cardiac telemetry, now reporting suicidal ideation when informed of plan for discharge and admitted voluntarily to [facility] for safety, medication optimization and psychiatric stabilization.

## 2023-08-16 NOTE — BH PSYCHOLOGY - CLINICIAN PSYCHOTHERAPY NOTE - NSBHPSYCHOLNARRATIVE_PSY_A_CORE FT
APPEARANCE:  [x] adequately groomed []disheveled  [] malodorous [] Other:     BEHAVIOR: [x] cooperative [] uncooperative [x] good EC [] poor EC [x] well related [] oddly related [] guarded []PMA [] PMR []abnormal movements [] Other: _____________     SPEED: [x] normal rate/rhythm/volume [] loud [] quiet [] slow  [] rapid [] pressured [] Other: _________   MOOD: [] euthymic [x] dysphoric []anxious [] irritable [] Other: ___________   AFFECT: [] full [] expansive []x constricted [] blunted [] flat [] stable [] labile [] Other: _________________   THOUGHT PROCESS: [x] organized [] disorganized [] goal-directed [] concrete [] logical  [] illogical   [] circumstantial [] tangential [] impoverished [] effusive [x] repetitive [] Other: ___________   THOUGHT CONTENT: [] negative for delusions/suicidal ideation /homicidal ideation  [] positive for delusions/suicidal ideation/homicidal ideation Describe: reported that he still has AH telling him to hurt himself or others, and thoughts of suicide, but denied any intent to act on these urges while in the hospital   PERCEPTION: [] negative for auditory/ visual hallucinations  [x] positive for auditory/ visual hallucinations Describe: reported that he is hearing voices, however during the assessment, he did not appear to be internally preoccupied._  INSIGHT/JUDGMENT: [] good [x]fair [] poor        IMPULSE CONTROL: [x] good []fair [] poor         COGNITION: [x] alert and oriented to person,time,place    Lacks orientation to person/ time/ place. Describe: _______________________    Met with Mr. Jones for individual psychotherapy session to complete CAMS assessment. He reported that yesterday, he was feeling better, but today, he woke up in a negative mood, hearing voices, and having thoughts of suicide, by [place] or finding a roof, although unable to identify which roof it would be. In ams  APPEARANCE:  [x] adequately groomed []disheveled  [] malodorous [] Other:     BEHAVIOR: [x] cooperative [] uncooperative [x] good EC [] poor EC [x] well related [] oddly related [] guarded []PMA [] PMR []abnormal movements [] Other: _____________     SPEED: [x] normal rate/rhythm/volume [] loud [] quiet [] slow  [] rapid [] pressured [] Other: _________   MOOD: [] euthymic [x] dysphoric []anxious [] irritable [] Other: ___________   AFFECT: [] full [] expansive []x constricted [] blunted [] flat [] stable [] labile [] Other: _________________   THOUGHT PROCESS: [x] organized [] disorganized [] goal-directed [] concrete [] logical  [] illogical   [] circumstantial [] tangential [] impoverished [] effusive [x] repetitive [] Other: ___________   THOUGHT CONTENT: [] negative for delusions/suicidal ideation /homicidal ideation  [] positive for delusions/suicidal ideation/homicidal ideation Describe: reported that he still has AH telling him to hurt himself or others, and thoughts of suicide, but denied any intent to act on these urges while in the hospital   PERCEPTION: [] negative for auditory/ visual hallucinations  [x] positive for auditory/ visual hallucinations Describe: reported that he is hearing voices, however during the assessment, he did not appear to be internally preoccupied._  INSIGHT/JUDGMENT: [] good [x]fair [] poor        IMPULSE CONTROL: [x] good []fair [] poor         COGNITION: [x] alert and oriented to person,time,place    Lacks orientation to person/ time/ place. Describe: _______________________    Met with Mr. Jones for individual psychotherapy session to complete CAMS assessment. He reported that yesterday, he was feeling better, but today, he woke up in a negative mood, hearing voices, and having thoughts of suicide, by jumping [place] or finding a roof, although unable to identify which roof it would be. He reported that if this hospital is able to help him go to [facility] and place him in a shelter there, which can aid in him finding long term housing, that would be helpful to him. He started the session stating that he still has intense thoughts of suicide in the context of hearing voices, and thinks that he might offend voices. Reported that he ahs been hearing the voices for only last 2 weeks, but later added that he has been dealing with voices since 1996.  In CAMS drivers section, he identified pretty high scores on all items either 4 or 5. He reported that his overall risk of suicide is 5/5 today, stating that he wants to jumpr from somewhere, because he is stressed that he is dealing with these problems for the 1st time, however later reported that he had been dealing with psychiatric issues since 2000, hearing voices, taking medications, and has been hospitalized many times. He identified many reasons for living, such as "sister, mother, brother, nephews and [employer]. He reported that [facility] very helpful to combat hopelessness. Reasons for dying include "hopelessness, feels no good for nothing, feels people hate him, and not being able to do what he wants to do, such as going to [facility]. Wish to live and die are both 4/8 and one thing that would help him no longer feel suicidal is "if they find a place for me in [facility]- I did not talk to my  yet". Mr. Jones reported that he had thoughts of jumping to the river, but decided to seek help in the hospital, as he wants to go to [facility], in a shelter [facility] and also rehab for her methadone, and unsure if it would decrease his suicidality, but believes he would feel safer. He reported that if he were to go to [facility], he would go to a palce that can help him find apartment, pay the rent with his disability check, and he would also want to get an attendant for himself, as he does not like to be alone.   This writer and pt also discussed tx plan to cope with problems that contribute to his suicidality, and he reported that voices need to be addressed with medication.  At this time, Mr. Jones is moderate risk for suicide, he is endorsing SI in the context of chronic voices, however, his suicidality is conditional on the housing situation and he appears to have significant future orientedness and vision of what would be helpful for him.  Static: Prior hospitalization, hearing voices, SMI  Moderate: Current depressive episode and suicidality  Protective: Future orietned, identifies coping skills and what would be helpful.

## 2023-08-17 LAB
GLUCOSE BLDC GLUCOMTR-MCNC: 100 MG/DL — HIGH (ref 70–99)
GLUCOSE BLDC GLUCOMTR-MCNC: 104 MG/DL — HIGH (ref 70–99)
GLUCOSE BLDC GLUCOMTR-MCNC: 106 MG/DL — HIGH (ref 70–99)
GLUCOSE BLDC GLUCOMTR-MCNC: 97 MG/DL — SIGNIFICANT CHANGE UP (ref 70–99)

## 2023-08-17 RX ADMIN — METHADONE HYDROCHLORIDE 60 MILLIGRAM(S): 40 TABLET ORAL at 09:55

## 2023-08-17 RX ADMIN — Medication 1 PATCH: at 07:15

## 2023-08-17 RX ADMIN — Medication 1 MILLIGRAM(S): at 14:00

## 2023-08-17 RX ADMIN — AMLODIPINE BESYLATE 10 MILLIGRAM(S): 2.5 TABLET ORAL at 09:56

## 2023-08-17 RX ADMIN — ARIPIPRAZOLE 20 MILLIGRAM(S): 15 TABLET ORAL at 09:56

## 2023-08-17 RX ADMIN — Medication 1 PATCH: at 09:56

## 2023-08-17 RX ADMIN — Medication 1 MILLIGRAM(S): at 09:56

## 2023-08-17 RX ADMIN — Medication 600 MILLIGRAM(S): at 11:31

## 2023-08-17 RX ADMIN — Medication 1 PATCH: at 09:55

## 2023-08-17 RX ADMIN — Medication 81 MILLIGRAM(S): at 09:56

## 2023-08-17 RX ADMIN — POLYETHYLENE GLYCOL 3350 17 GRAM(S): 17 POWDER, FOR SOLUTION ORAL at 21:26

## 2023-08-17 RX ADMIN — QUETIAPINE FUMARATE 100 MILLIGRAM(S): 200 TABLET, FILM COATED ORAL at 21:26

## 2023-08-17 RX ADMIN — ATORVASTATIN CALCIUM 40 MILLIGRAM(S): 80 TABLET, FILM COATED ORAL at 21:26

## 2023-08-17 RX ADMIN — OLANZAPINE 5 MILLIGRAM(S): 15 TABLET, FILM COATED ORAL at 06:02

## 2023-08-17 RX ADMIN — Medication 600 MILLIGRAM(S): at 12:31

## 2023-08-17 RX ADMIN — SENNA PLUS 1 TABLET(S): 8.6 TABLET ORAL at 21:26

## 2023-08-17 RX ADMIN — Medication 1 PATCH: at 18:38

## 2023-08-17 RX ADMIN — Medication 1 MILLIGRAM(S): at 21:26

## 2023-08-17 NOTE — BH INPATIENT PSYCHIATRY PROGRESS NOTE - NSBHCONSDANGERSELF_PSY_A_CORE
unable to care for self
unable to care for self
suicidal ideation with plan and means
unable to care for self
unable to care for self

## 2023-08-18 LAB
GLUCOSE BLDC GLUCOMTR-MCNC: 111 MG/DL — HIGH (ref 70–99)
GLUCOSE BLDC GLUCOMTR-MCNC: 122 MG/DL — HIGH (ref 70–99)
GLUCOSE BLDC GLUCOMTR-MCNC: 81 MG/DL — SIGNIFICANT CHANGE UP (ref 70–99)
GLUCOSE BLDC GLUCOMTR-MCNC: 99 MG/DL — SIGNIFICANT CHANGE UP (ref 70–99)
SARS-COV-2 RNA SPEC QL NAA+PROBE: SIGNIFICANT CHANGE UP

## 2023-08-18 PROCEDURE — 99231 SBSQ HOSP IP/OBS SF/LOW 25: CPT

## 2023-08-18 RX ORDER — BENZTROPINE MESYLATE 1 MG
1 TABLET ORAL
Refills: 0 | Status: DISCONTINUED | OUTPATIENT
Start: 2023-08-18 | End: 2023-08-21

## 2023-08-18 RX ADMIN — QUETIAPINE FUMARATE 100 MILLIGRAM(S): 200 TABLET, FILM COATED ORAL at 21:51

## 2023-08-18 RX ADMIN — Medication 1 PATCH: at 10:19

## 2023-08-18 RX ADMIN — Medication 600 MILLIGRAM(S): at 11:25

## 2023-08-18 RX ADMIN — Medication 1 PATCH: at 19:47

## 2023-08-18 RX ADMIN — Medication 1 MILLIGRAM(S): at 21:51

## 2023-08-18 RX ADMIN — Medication 600 MILLIGRAM(S): at 12:00

## 2023-08-18 RX ADMIN — ARIPIPRAZOLE 20 MILLIGRAM(S): 15 TABLET ORAL at 10:17

## 2023-08-18 RX ADMIN — METHADONE HYDROCHLORIDE 60 MILLIGRAM(S): 40 TABLET ORAL at 10:15

## 2023-08-18 RX ADMIN — OLANZAPINE 5 MILLIGRAM(S): 15 TABLET, FILM COATED ORAL at 14:02

## 2023-08-18 RX ADMIN — ATORVASTATIN CALCIUM 40 MILLIGRAM(S): 80 TABLET, FILM COATED ORAL at 21:51

## 2023-08-18 RX ADMIN — Medication 1 MILLIGRAM(S): at 10:18

## 2023-08-18 RX ADMIN — Medication 1 PATCH: at 10:20

## 2023-08-18 RX ADMIN — SENNA PLUS 1 TABLET(S): 8.6 TABLET ORAL at 21:51

## 2023-08-18 RX ADMIN — Medication 650 MILLIGRAM(S): at 14:30

## 2023-08-18 RX ADMIN — Medication 650 MILLIGRAM(S): at 14:02

## 2023-08-18 RX ADMIN — AMLODIPINE BESYLATE 10 MILLIGRAM(S): 2.5 TABLET ORAL at 10:18

## 2023-08-18 RX ADMIN — Medication 81 MILLIGRAM(S): at 10:14

## 2023-08-18 NOTE — BH INPATIENT PSYCHIATRY PROGRESS NOTE - NSTXSUICIDINTERMD_PSY_ALL_CORE
Psychopharmacology x15 minutes

## 2023-08-18 NOTE — BH INPATIENT PSYCHIATRY PROGRESS NOTE - NSBHCHARTREVIEWVS_PSY_A_CORE FT
Vital Signs Last 24 Hrs  T(C): 37.1 (08-18-23 @ 10:46), Max: 37.1 (08-18-23 @ 10:46)  T(F): 98.7 (08-18-23 @ 10:46), Max: 98.7 (08-18-23 @ 10:46)  HR: 91 (08-18-23 @ 10:46) (74 - 91)  BP: 153/93 (08-18-23 @ 10:46) (143/80 - 153/93)  BP(mean): --  RR: 18 (08-18-23 @ 10:46) (18 - 18)  SpO2: 95% (08-18-23 @ 10:46) (95% - 100%)    
Vital Signs Last 24 Hrs  T(C): --  T(F): --  HR: --  BP: --  BP(mean): --  RR: --  SpO2: --    
Vital Signs Last 24 Hrs  T(C): 36.6 (08-16-23 @ 10:00), Max: 36.6 (08-16-23 @ 10:00)  T(F): 97.8 (08-16-23 @ 10:00), Max: 97.8 (08-16-23 @ 10:00)  HR: 79 (08-16-23 @ 10:00) (73 - 79)  BP: 134/83 (08-16-23 @ 10:00) (126/80 - 134/83)  BP(mean): --  RR: 17 (08-16-23 @ 10:00) (16 - 17)  SpO2: 100% (08-16-23 @ 10:00) (99% - 100%)    
Vital Signs Last 24 Hrs  T(C): 37 (08-17-23 @ 16:34), Max: 37 (08-17-23 @ 16:34)  T(F): 98.6 (08-17-23 @ 16:34), Max: 98.6 (08-17-23 @ 16:34)  HR: 74 (08-17-23 @ 16:34) (67 - 74)  BP: 143/80 (08-17-23 @ 16:34) (127/85 - 143/80)  BP(mean): --  RR: 18 (08-17-23 @ 16:34) (18 - 18)  SpO2: 100% (08-17-23 @ 16:34) (97% - 100%)    
Vital Signs Last 24 Hrs  T(C): 36.9 (08-12-23 @ 16:16), Max: 36.9 (08-12-23 @ 16:16)  T(F): 98.5 (08-12-23 @ 16:16), Max: 98.5 (08-12-23 @ 16:16)  HR: 62 (08-12-23 @ 16:16) (62 - 62)  BP: 144/87 (08-12-23 @ 16:16) (144/87 - 144/87)  BP(mean): --  RR: 18 (08-12-23 @ 16:16) (18 - 18)  SpO2: 99% (08-12-23 @ 16:16) (99% - 99%)    
Vital Signs Last 24 Hrs  T(C): 36.7 (08-15-23 @ 10:44), Max: 36.7 (08-14-23 @ 16:45)  T(F): 98 (08-15-23 @ 10:44), Max: 98 (08-14-23 @ 16:45)  HR: 67 (08-15-23 @ 10:44) (62 - 70)  BP: 138/89 (08-15-23 @ 10:44) (138/89 - 143/83)  BP(mean): --  RR: 16 (08-15-23 @ 10:44) (16 - 18)  SpO2: 100% (08-15-23 @ 10:44) (99% - 100%)
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
## 2023-08-18 NOTE — BH INPATIENT PSYCHIATRY PROGRESS NOTE - NSICDXBHTERTIARYDX_PSY_ALL_CORE
R/O Substance induced mood disorder   F19.94  R/O Adjustment disorder with disturbance of conduct   F43.24  
R/O Substance induced mood disorder   F19.94  R/O Adjustment disorder with disturbance of conduct   F43.24  R/O Malingering   Z76.5  

## 2023-08-18 NOTE — BH INPATIENT PSYCHIATRY PROGRESS NOTE - NSBHMETABOLIC_PSY_ALL_CORE_FT
BMI: BMI (kg/m2): 28.4 (08-10-23 @ 00:22)  HbA1c: A1C with Estimated Average Glucose Result: 5.4 % (08-10-23 @ 05:30)    Glucose: POCT Blood Glucose.: 95 mg/dL (08-14-23 @ 07:51)    BP: 142/84 (08-13-23 @ 10:30) (142/84 - 147/87)  Lipid Panel: Date/Time: 08-10-23 @ 05:30  Cholesterol, Serum: 110  Direct LDL: --  HDL Cholesterol, Serum: 49  Total Cholesterol/HDL Ration Measurement: --  Triglycerides, Serum: 66  
BMI: BMI (kg/m2): 28.4 (08-10-23 @ 00:22)  HbA1c: A1C with Estimated Average Glucose Result: 5.4 % (08-10-23 @ 05:30)    Glucose: POCT Blood Glucose.: 136 mg/dL (08-13-23 @ 11:48)    BP: 144/87 (08-12-23 @ 16:16) (144/87 - 147/87)  Lipid Panel: Date/Time: 08-10-23 @ 05:30  Cholesterol, Serum: 110  Direct LDL: --  HDL Cholesterol, Serum: 49  Total Cholesterol/HDL Ration Measurement: --  Triglycerides, Serum: 66  
BMI: BMI (kg/m2): 28.4 (08-10-23 @ 00:22)  HbA1c: A1C with Estimated Average Glucose Result: 5.4 % (08-10-23 @ 05:30)    Glucose: POCT Blood Glucose.: 81 mg/dL (08-18-23 @ 11:27)    BP: 153/93 (08-18-23 @ 10:46) (126/80 - 153/93)  Lipid Panel: Date/Time: 08-10-23 @ 05:30  Cholesterol, Serum: 110  Direct LDL: --  HDL Cholesterol, Serum: 49  Total Cholesterol/HDL Ration Measurement: --  Triglycerides, Serum: 66  
BMI: BMI (kg/m2): 28.4 (08-10-23 @ 00:22)  HbA1c: A1C with Estimated Average Glucose Result: 5.4 % (08-10-23 @ 05:30)    Glucose: POCT Blood Glucose.: 106 mg/dL (08-17-23 @ 11:33)    BP: 143/80 (08-17-23 @ 16:34) (126/80 - 143/83)  Lipid Panel: Date/Time: 08-10-23 @ 05:30  Cholesterol, Serum: 110  Direct LDL: --  HDL Cholesterol, Serum: 49  Total Cholesterol/HDL Ration Measurement: --  Triglycerides, Serum: 66  
BMI: BMI (kg/m2): 28.4 (08-10-23 @ 00:22)  HbA1c: A1C with Estimated Average Glucose Result: 5.4 % (08-10-23 @ 05:30)    Glucose: POCT Blood Glucose.: 118 mg/dL (08-16-23 @ 12:33)    BP: 134/83 (08-16-23 @ 10:00) (126/80 - 143/83)  Lipid Panel: Date/Time: 08-10-23 @ 05:30  Cholesterol, Serum: 110  Direct LDL: --  HDL Cholesterol, Serum: 49  Total Cholesterol/HDL Ration Measurement: --  Triglycerides, Serum: 66  
BMI: BMI (kg/m2): 28.4 (08-10-23 @ 00:22)  HbA1c: A1C with Estimated Average Glucose Result: 5.4 % (08-10-23 @ 05:30)    Glucose: POCT Blood Glucose.: 100 mg/dL (08-15-23 @ 11:30)    BP: 138/89 (08-15-23 @ 10:44) (130/85 - 143/83)  Lipid Panel: Date/Time: 08-10-23 @ 05:30  Cholesterol, Serum: 110  Direct LDL: --  HDL Cholesterol, Serum: 49  Total Cholesterol/HDL Ration Measurement: --  Triglycerides, Serum: 66

## 2023-08-18 NOTE — BH INPATIENT PSYCHIATRY PROGRESS NOTE - NSBHASSESSSUMMFT_PSY_ALL_CORE
52 year old male, single, undomiciled, unemployed with PMH HTN, HLD, DM2, CAD s/p stents and PPH schizoaffective disorder bipolar type, SIMD, Cluster-B Personality Traits, Polysubstance Use Disorder (Tobacco, Alcohol, Cocaine, Stimulant), Opioid Use Disorder (currently prescribed Methadone 60mg daily at [facility]), history of prior inpatient psychiatric hospitalizations most recently at [facility] (02/21/22 – 02/24/22) for SIMD, currently engaged in outpatient psychiatric treatment at [facility], patient-reported history of suicide attempts, no history of NSSIB, no history of violence, initially presenting to [facility] ED on the evening of 08/09/23 complaining of intermittent, exertional, non-radiating, substernal chest pain x 1 day and admitted to [facility] medicine for cardiac telemetry, now reporting suicidal ideation when informed of plan for discharge and admitted voluntarily to [address] for safety, medication optimization and psychiatric stabilization.    On evaluation, the patient is calm, cooperative, fairly-related, soft-spoken with fair eye contact, constricted affect and demonstrating good behavioral control and linear thought processes.  On initial interview by  Psychiatry, the patient reported low mood, SI with intent, a recent history of a suicide attempt and was unable to contract for safety.  The patient also reported a history of auditory hallucinations, although, he currently denies them.  Differential includes Schizoaffective Disorder vs SIMD vs Adjustment Disorder with Disturbance of Conduct.  The patient would benefit from a voluntary inpatient psychiatric hospitalization for safety, medication optimization, psychiatric stabilization, diagnostic clarification and coordination with outpatient providers.      Plan:    #Schizophrenia vs Schizoaffective Disorder vs SIMD vs Adjustment Disorder with Disturbance of Conduct:  ·  Admit Voluntarily to [address]  ·  CO 1:1 not psychiatrically indicated at this time; q15 min checks  ·  Most recent qtc 479 ms  ·  Continue aripiprazole (Abilify) 15 mg PO daily  ·  Continue benztropine (Cogentin) 1 mg PO TID  ·  Continue clonazepam (Klonopin) 1 mg PO TID  ·  Hold home Doxepin 100 mg PO BID given non-formulary  ·  Continue quetiapine (Seroquel) 100 mg PO qHs  ·  PRNs: Olanzapine 5 mg PO q6h prn, with escalation to IM if patient refusing PO and remains an imminent danger to self or others; if IM antipsychotic is administered, please perform follow-up ECG for QTc monitoring (<500).  · Obtain collateral from Catherine Person (Sister): [phone]    #Polysubstance Use Disorder:  #Tobacco Use Disorder:  #Opioid Use Disorder:   ·  Continue Methadone 60mg daily for opioid dependence  ·  Plan for follow-up at [facility] upon discharge ([phone]) for management of Opioid Use Disorder  ·  c/w nicotine 21mg/24h patch transdermal daily for NRT    #Essential Hypertension:  ·  Continue amlodipine 10 mg PO daily    #Hyperlipidemia (HLD):   -Continue Atorvastatin 40mg PO daily  -Lipid Panel (08/10/23): Cholesterol 110, Triglycerides 66, HDL 49, Non-HDL 61, LDL 48     #Diabetes mellitus, type 2:  ·  Hgb A1c (08/10/23): 5.4  ·  Monitor FS Blood Glucose  ·  Continue Insulin Lispro Med dose sliding scale  (past hx on Metformin 500mg daily) per Cardiology recommendations    #CAD (coronary artery disease):  #Paroxysmal atrial fibrillation:  ·  Most recent ECG demonstrates sinus rhythm  ·  Continue ASA 81mg daily  
52 year old male, single, undomiciled, unemployed with PMH HTN, HLD, DM2, CAD s/p stents and PPH schizoaffective disorder bipolar type, SIMD, Cluster-B Personality Traits, Polysubstance Use Disorder (Tobacco, Alcohol, Cocaine, Stimulant), Opioid Use Disorder (currently prescribed Methadone 60mg daily at [facility]), history of prior inpatient psychiatric hospitalizations most recently at [facility] (02/21/22 – 02/24/22) for SIMD, currently engaged in outpatient psychiatric treatment at [facility], patient-reported history of suicide attempts, no history of NSSIB, no history of violence, initially presenting to [facility] ED on the evening of 08/09/23 complaining of intermittent, exertional, non-radiating, substernal chest pain x 1 day and admitted to [facility] medicine for cardiac telemetry, now reporting suicidal ideation when informed of plan for discharge and admitted voluntarily to [address] for safety, medication optimization and psychiatric stabilization.    On evaluation, the patient is calm, cooperative, fairly-related, soft-spoken with fair eye contact, constricted affect and demonstrating good behavioral control and linear thought processes.  On initial interview by  Psychiatry, the patient reported low mood, SI with intent, a recent history of a suicide attempt and was unable to contract for safety.  The patient also reported a history of auditory hallucinations, although, he currently denies them.  Differential includes Schizoaffective Disorder vs SIMD vs Adjustment Disorder with Disturbance of Conduct.  The patient would benefit from a voluntary inpatient psychiatric hospitalization for safety, medication optimization, psychiatric stabilization, diagnostic clarification and coordination with outpatient providers.      Plan:    #Schizophrenia vs Schizoaffective Disorder vs SIMD vs Adjustment Disorder with Disturbance of Conduct:  ·  Admit Voluntarily to [address]  ·  CO 1:1 not psychiatrically indicated at this time; q15 min checks  ·  Most recent qtc 479 ms  ·  Increase Abilify to 20mg mg PO daily  ·  Continue benztropine (Cogentin) 1 mg PO TID  ·  decrease clonazepam (Klonopin) from 1 mg PO TID to 1mg bid  ·  Hold home Doxepin 100 mg PO BID given non-formulary  ·  Continue quetiapine (Seroquel) 100 mg PO qHs  ·  PRNs: Olanzapine 5 mg PO q6h prn, with escalation to IM if patient refusing PO and remains an imminent danger to self or others; if IM antipsychotic is administered, please perform follow-up ECG for QTc monitoring (<500).  · Obtain collateral from Catherine Person (Sister): [phone]    #Polysubstance Use Disorder:  #Tobacco Use Disorder:  #Opioid Use Disorder:   ·  Continue Methadone 60mg daily for opioid dependence  ·  Plan for follow-up at [facility] upon discharge ([phone]) for management of Opioid Use Disorder  ·  c/w nicotine 21mg/24h patch transdermal daily for NRT    #Essential Hypertension:  ·  Continue amlodipine 10 mg PO daily    #Hyperlipidemia (HLD):   -Continue Atorvastatin 40mg PO daily  -Lipid Panel (08/10/23): Cholesterol 110, Triglycerides 66, HDL 49, Non-HDL 61, LDL 48     #Diabetes mellitus, type 2:  ·  Hgb A1c (08/10/23): 5.4  ·  Monitor FS Blood Glucose  ·  Continue Insulin Lispro Med dose sliding scale  (past hx on Metformin 500mg daily) per Cardiology recommendations    #CAD (coronary artery disease):  #Paroxysmal atrial fibrillation:  ·  Most recent ECG demonstrates sinus rhythm  ·  Continue ASA 81mg daily  
52 year old male, single, undomiciled, unemployed with PMH HTN, HLD, DM2, CAD s/p stents and PPH schizoaffective disorder bipolar type, SIMD, Cluster-B Personality Traits, Polysubstance Use Disorder (Tobacco, Alcohol, Cocaine, Stimulant), Opioid Use Disorder (currently prescribed Methadone 60mg daily at [facility]), history of prior inpatient psychiatric hospitalizations most recently at [facility] (02/21/22 – 02/24/22) for SIMD, currently engaged in outpatient psychiatric treatment at [facility], patient-reported history of suicide attempts, no history of NSSIB, no history of violence, initially presenting to [facility] ED on the evening of 08/09/23 complaining of intermittent, exertional, non-radiating, substernal chest pain x 1 day and admitted to [facility] medicine for cardiac telemetry, now reporting suicidal ideation when informed of plan for discharge and admitted voluntarily to [address] for safety, medication optimization and psychiatric stabilization.    On evaluation, the patient is calm, cooperative, fairly-related, soft-spoken with fair eye contact, constricted affect and demonstrating good behavioral control and linear thought processes.  On initial interview by  Psychiatry, the patient reported low mood, SI with intent, a recent history of a suicide attempt and was unable to contract for safety.  The patient also reported a history of auditory hallucinations, although, he currently denies them.  Differential includes Schizoaffective Disorder vs SIMD vs Adjustment Disorder with Disturbance of Conduct.  The patient would benefit from a voluntary inpatient psychiatric hospitalization for safety, medication optimization, psychiatric stabilization, diagnostic clarification and coordination with outpatient providers.      Plan:    #Schizophrenia vs Schizoaffective Disorder vs SIMD vs Adjustment Disorder with Disturbance of Conduct:  ·  Admit Voluntarily to [address]  ·  CO 1:1 not psychiatrically indicated at this time; q15 min checks  ·  Most recent qtc 479 ms  ·  Continue aripiprazole (Abilify) 15 mg PO daily  ·  Continue benztropine (Cogentin) 1 mg PO TID  ·  Continue clonazepam (Klonopin) 1 mg PO TID  ·  Hold home Doxepin 100 mg PO BID given non-formulary  ·  Continue quetiapine (Seroquel) 100 mg PO qHs  ·  PRNs: Olanzapine 5 mg PO q6h prn, with escalation to IM if patient refusing PO and remains an imminent danger to self or others; if IM antipsychotic is administered, please perform follow-up ECG for QTc monitoring (<500).  · Obtain collateral from Catherine Person (Sister): [phone]    #Polysubstance Use Disorder:  #Tobacco Use Disorder:  #Opioid Use Disorder:   ·  Continue Methadone 60mg daily for opioid dependence  ·  Plan for follow-up at [facility] upon discharge ([phone]) for management of Opioid Use Disorder  ·  c/w nicotine 21mg/24h patch transdermal daily for NRT    #Essential Hypertension:  ·  Continue amlodipine 10 mg PO daily    #Hyperlipidemia (HLD):   -Continue Atorvastatin 40mg PO daily  -Lipid Panel (08/10/23): Cholesterol 110, Triglycerides 66, HDL 49, Non-HDL 61, LDL 48     #Diabetes mellitus, type 2:  ·  Hgb A1c (08/10/23): 5.4  ·  Monitor FS Blood Glucose  ·  Continue Insulin Lispro Med dose sliding scale  (past hx on Metformin 500mg daily) per Cardiology recommendations    #CAD (coronary artery disease):  #Paroxysmal atrial fibrillation:  ·  Most recent ECG demonstrates sinus rhythm  ·  Continue ASA 81mg daily  
52 year old male, single, undomiciled, unemployed with PMH HTN, HLD, DM2, CAD s/p stents and PPH schizoaffective disorder bipolar type, SIMD, Cluster-B Personality Traits, Polysubstance Use Disorder (Tobacco, Alcohol, Cocaine, Stimulant), Opioid Use Disorder (currently prescribed Methadone 60mg daily at [facility]), history of prior inpatient psychiatric hospitalizations most recently at [facility] (02/21/22 – 02/24/22) for SIMD, currently engaged in outpatient psychiatric treatment at [facility], patient-reported history of suicide attempts, no history of NSSIB, no history of violence, initially presenting to [facility] ED on the evening of 08/09/23 complaining of intermittent, exertional, non-radiating, substernal chest pain x 1 day and admitted to [facility] medicine for cardiac telemetry, now reporting suicidal ideation when informed of plan for discharge and admitted voluntarily to [address] for safety, medication optimization and psychiatric stabilization.    On evaluation, the patient is calm, cooperative, fairly-related, soft-spoken with fair eye contact, constricted affect and demonstrating good behavioral control and linear thought processes.  On initial interview by  Psychiatry, the patient reported low mood, SI with intent, a recent history of a suicide attempt and was unable to contract for safety.  The patient also reported a history of auditory hallucinations, although, he currently denies them.  Differential includes Schizoaffective Disorder vs SIMD vs Adjustment Disorder with Disturbance of Conduct.  The patient would benefit from a voluntary inpatient psychiatric hospitalization for safety, medication optimization, psychiatric stabilization, diagnostic clarification and coordination with outpatient providers.      Plan:    #Schizophrenia vs Schizoaffective Disorder vs SIMD vs Adjustment Disorder with Disturbance of Conduct:  ·  Admit Voluntarily to [address]  ·  CO 1:1 not psychiatrically indicated at this time; q15 min checks  ·  Most recent qtc 479 ms  ·  Increase Abilify to 20mg mg PO daily  ·  Continue benztropine (Cogentin) 1 mg PO TID  ·  decrease clonazepam (Klonopin) from 1 mg PO TID to 1mg bid  ·  Hold home Doxepin 100 mg PO BID given non-formulary  ·  Continue quetiapine (Seroquel) 100 mg PO qHs  ·  PRNs: Olanzapine 5 mg PO q6h prn, with escalation to IM if patient refusing PO and remains an imminent danger to self or others; if IM antipsychotic is administered, please perform follow-up ECG for QTc monitoring (<500).  · Obtain collateral from Catherine Person (Sister): [phone]    #Polysubstance Use Disorder:  #Tobacco Use Disorder:  #Opioid Use Disorder:   ·  Continue Methadone 60mg daily for opioid dependence  ·  Plan for follow-up at [facility] upon discharge ([phone]) for management of Opioid Use Disorder  ·  c/w nicotine 21mg/24h patch transdermal daily for NRT    #Essential Hypertension:  ·  Continue amlodipine 10 mg PO daily    #Hyperlipidemia (HLD):   -Continue Atorvastatin 40mg PO daily  -Lipid Panel (08/10/23): Cholesterol 110, Triglycerides 66, HDL 49, Non-HDL 61, LDL 48     #Diabetes mellitus, type 2:  ·  Hgb A1c (08/10/23): 5.4  ·  Monitor FS Blood Glucose  ·  Continue Insulin Lispro Med dose sliding scale  (past hx on Metformin 500mg daily) per Cardiology recommendations    #CAD (coronary artery disease):  #Paroxysmal atrial fibrillation:  ·  Most recent ECG demonstrates sinus rhythm  ·  Continue ASA 81mg daily  
52 year old male, single, undomiciled, unemployed with PMH HTN, HLD, DM2, CAD s/p stents and PPH schizoaffective disorder bipolar type, SIMD, Cluster-B Personality Traits, Polysubstance Use Disorder (Tobacco, Alcohol, Cocaine, Stimulant), Opioid Use Disorder (currently prescribed Methadone 60mg daily at [facility]), history of prior inpatient psychiatric hospitalizations most recently at [facility] (02/21/22 – 02/24/22) for SIMD, currently engaged in outpatient psychiatric treatment at [facility], patient-reported history of suicide attempts, no history of NSSIB, no history of violence, initially presenting to [facility] ED on the evening of 08/09/23 complaining of intermittent, exertional, non-radiating, substernal chest pain x 1 day and admitted to [facility] medicine for cardiac telemetry, now reporting suicidal ideation when informed of plan for discharge and admitted voluntarily to [address] for safety, medication optimization and psychiatric stabilization.    On evaluation, the patient is calm, cooperative, fairly-related, soft-spoken with fair eye contact, constricted affect and demonstrating good behavioral control and linear thought processes.  On initial interview by  Psychiatry, the patient reported low mood, SI with intent, a recent history of a suicide attempt and was unable to contract for safety.  The patient also reported a history of auditory hallucinations, although, he currently denies them.  Differential includes Schizoaffective Disorder vs SIMD vs Adjustment Disorder with Disturbance of Conduct.  The patient would benefit from a voluntary inpatient psychiatric hospitalization for safety, medication optimization, psychiatric stabilization, diagnostic clarification and coordination with outpatient providers.      Plan:    #Schizophrenia vs Schizoaffective Disorder vs SIMD vs Adjustment Disorder with Disturbance of Conduct:  ·  Admit Voluntarily to [address]  ·  CO 1:1 not psychiatrically indicated at this time; q15 min checks  ·  Most recent qtc 479 ms  ·  Increase Abilify to 20mg mg PO daily  ·  Continue benztropine (Cogentin) 1 mg PO TID  ·  decrease clonazepam (Klonopin) from 1 mg PO TID to 1mg bid  ·  Hold home Doxepin 100 mg PO BID given non-formulary  ·  Continue quetiapine (Seroquel) 100 mg PO qHs  ·  PRNs: Olanzapine 5 mg PO q6h prn, with escalation to IM if patient refusing PO and remains an imminent danger to self or others; if IM antipsychotic is administered, please perform follow-up ECG for QTc monitoring (<500).  · Obtain collateral from Catherine Person (Sister): [phone]    #Polysubstance Use Disorder:  #Tobacco Use Disorder:  #Opioid Use Disorder:   ·  Continue Methadone 60mg daily for opioid dependence  ·  Plan for follow-up at [facility] upon discharge ([phone]) for management of Opioid Use Disorder  ·  c/w nicotine 21mg/24h patch transdermal daily for NRT    #Essential Hypertension:  ·  Continue amlodipine 10 mg PO daily    #Hyperlipidemia (HLD):   -Continue Atorvastatin 40mg PO daily  -Lipid Panel (08/10/23): Cholesterol 110, Triglycerides 66, HDL 49, Non-HDL 61, LDL 48     #Diabetes mellitus, type 2:  ·  Hgb A1c (08/10/23): 5.4  ·  Monitor FS Blood Glucose  ·  Continue Insulin Lispro Med dose sliding scale  (past hx on Metformin 500mg daily) per Cardiology recommendations    #CAD (coronary artery disease):  #Paroxysmal atrial fibrillation:  ·  Most recent ECG demonstrates sinus rhythm  ·  Continue ASA 81mg daily  
52 year old male, single, undomiciled, unemployed with PMH HTN, HLD, DM2, CAD s/p stents and PPH schizoaffective disorder bipolar type, SIMD, Cluster-B Personality Traits, Polysubstance Use Disorder (Tobacco, Alcohol, Cocaine, Stimulant), Opioid Use Disorder (currently prescribed Methadone 60mg daily at [facility]), history of prior inpatient psychiatric hospitalizations most recently at [facility] (02/21/22 – 02/24/22) for SIMD, currently engaged in outpatient psychiatric treatment at [facility], patient-reported history of suicide attempts, no history of NSSIB, no history of violence, initially presenting to [facility] ED on the evening of 08/09/23 complaining of intermittent, exertional, non-radiating, substernal chest pain x 1 day and admitted to [facility] medicine for cardiac telemetry, now reporting suicidal ideation when informed of plan for discharge and admitted voluntarily to [address] for safety, medication optimization and psychiatric stabilization.    On evaluation, the patient is calm, cooperative, fairly-related, soft-spoken with fair eye contact, constricted affect and demonstrating good behavioral control and linear thought processes.  On initial interview by  Psychiatry, the patient reported low mood, SI with intent, a recent history of a suicide attempt and was unable to contract for safety.  The patient also reported a history of auditory hallucinations, although, he currently denies them.  Differential includes Schizoaffective Disorder vs SIMD vs Adjustment Disorder with Disturbance of Conduct.  The patient would benefit from a voluntary inpatient psychiatric hospitalization for safety, medication optimization, psychiatric stabilization, diagnostic clarification and coordination with outpatient providers.      Plan:    #Schizophrenia vs Schizoaffective Disorder vs SIMD vs Adjustment Disorder with Disturbance of Conduct:  ·  Admit Voluntarily to [address]  ·  CO 1:1 not psychiatrically indicated at this time; q15 min checks  ·  Most recent qtc 479 ms  ·  Increase Abilify to 20mg mg PO daily  ·  Continue benztropine (Cogentin) 1 mg PO TID  ·  decrease clonazepam (Klonopin) from 1 mg PO TID to 1mg bid  ·  Hold home Doxepin 100 mg PO BID given non-formulary  ·  Continue quetiapine (Seroquel) 100 mg PO qHs  ·  PRNs: Olanzapine 5 mg PO q6h prn, with escalation to IM if patient refusing PO and remains an imminent danger to self or others; if IM antipsychotic is administered, please perform follow-up ECG for QTc monitoring (<500).  · Obtain collateral from Catherine Person (Sister): [phone]    #Polysubstance Use Disorder:  #Tobacco Use Disorder:  #Opioid Use Disorder:   ·  Continue Methadone 60mg daily for opioid dependence  ·  Plan for follow-up at [facility] upon discharge ([phone]) for management of Opioid Use Disorder  ·  c/w nicotine 21mg/24h patch transdermal daily for NRT    #Essential Hypertension:  ·  Continue amlodipine 10 mg PO daily    #Hyperlipidemia (HLD):   -Continue Atorvastatin 40mg PO daily  -Lipid Panel (08/10/23): Cholesterol 110, Triglycerides 66, HDL 49, Non-HDL 61, LDL 48     #Diabetes mellitus, type 2:  ·  Hgb A1c (08/10/23): 5.4  ·  Monitor FS Blood Glucose  ·  Continue Insulin Lispro Med dose sliding scale  (past hx on Metformin 500mg daily) per Cardiology recommendations    #CAD (coronary artery disease):  #Paroxysmal atrial fibrillation:  ·  Most recent ECG demonstrates sinus rhythm  ·  Continue ASA 81mg daily

## 2023-08-18 NOTE — BH INPATIENT PSYCHIATRY PROGRESS NOTE - NSICDXBHPRIMARYDX_PSY_ALL_CORE
Schizoaffective disorder   F25.9  

## 2023-08-18 NOTE — BH INPATIENT PSYCHIATRY PROGRESS NOTE - NSBHMSEPERCEPT_PSY_A_CORE
Auditory hallucinations
No abnormalities
Auditory hallucinations

## 2023-08-18 NOTE — BH INPATIENT PSYCHIATRY PROGRESS NOTE - NSICDXBHSECONDARYDX_PSY_ALL_CORE
Polysubstance use disorder   F19.90  Opioid use disorder   F11.90  

## 2023-08-18 NOTE — BH INPATIENT PSYCHIATRY PROGRESS NOTE - NSDCCRITERIA_PSY_ALL_CORE
Decrease in intensity and frequency of SI, improvement of depressive symptoms

## 2023-08-18 NOTE — BH INPATIENT PSYCHIATRY PROGRESS NOTE - PRN MEDS
MEDICATIONS  (PRN):  acetaminophen     Tablet .. 650 milliGRAM(s) Oral every 6 hours PRN Mild Pain (1 - 3)  dextrose Oral Gel 15 Gram(s) Oral once PRN Blood Glucose LESS THAN 70 milliGRAM(s)/deciliter  ibuprofen  Tablet. 600 milliGRAM(s) Oral every 6 hours PRN Moderate Pain (4 - 6)  OLANZapine 5 milliGRAM(s) Oral every 6 hours PRN agitation  
MEDICATIONS  (PRN):  acetaminophen     Tablet .. 650 milliGRAM(s) Oral every 6 hours PRN Mild Pain (1 - 3), Moderate Pain (4 - 6)  dextrose Oral Gel 15 Gram(s) Oral once PRN Blood Glucose LESS THAN 70 milliGRAM(s)/deciliter  OLANZapine 5 milliGRAM(s) Oral every 6 hours PRN agitation  
MEDICATIONS  (PRN):  acetaminophen     Tablet .. 650 milliGRAM(s) Oral every 6 hours PRN Mild Pain (1 - 3)  dextrose Oral Gel 15 Gram(s) Oral once PRN Blood Glucose LESS THAN 70 milliGRAM(s)/deciliter  ibuprofen  Tablet. 600 milliGRAM(s) Oral every 6 hours PRN Moderate Pain (4 - 6)  
MEDICATIONS  (PRN):  acetaminophen     Tablet .. 650 milliGRAM(s) Oral every 6 hours PRN Mild Pain (1 - 3)  dextrose Oral Gel 15 Gram(s) Oral once PRN Blood Glucose LESS THAN 70 milliGRAM(s)/deciliter  ibuprofen  Tablet. 600 milliGRAM(s) Oral every 6 hours PRN Moderate Pain (4 - 6)  OLANZapine 5 milliGRAM(s) Oral every 6 hours PRN agitation

## 2023-08-18 NOTE — BH INPATIENT PSYCHIATRY PROGRESS NOTE - NSBHATTESTBILLING_PSY_A_CORE
53104-Bjmfwsmtdn OBS or IP - low complexity OR 25-34 mins
56465-Uckariflsa OBS or IP - moderate complexity OR 35-49 mins
57331-Lrtlxpbgvk OBS or IP - low complexity OR 25-34 mins
81085-Oixaulpzda OBS or IP - low complexity OR 25-34 mins
19306-Wabbvzekgb OBS or IP - moderate complexity OR 35-49 mins
94061-Vqcyaklyfr OBS or IP - moderate complexity OR 35-49 mins

## 2023-08-18 NOTE — BH INPATIENT PSYCHIATRY PROGRESS NOTE - NSBHMSEMUSCLE_PSY_A_CORE
Normal muscle tone/strength
Normal muscle tone/strength
wound care:  soap and water qd, xeroform gauze, kerlex, ace wrap dressing change qd
Normal muscle tone/strength

## 2023-08-18 NOTE — BH INPATIENT PSYCHIATRY PROGRESS NOTE - NSTXDCOTHRGOAL_PSY_ALL_CORE
Increase SOS score
Increased SOS score upon discharge
Increase SOS score
Increased SOS score upon discharge

## 2023-08-18 NOTE — BH INPATIENT PSYCHIATRY PROGRESS NOTE - NSBHATTESTTYPEVISIT_PSY_A_CORE
On-site Attending supervising FREDI (99XXX codes)
Resident/Fellow with telephonic supervision
On-site Attending supervising FREDI (99XXX codes)

## 2023-08-18 NOTE — BH INPATIENT PSYCHIATRY PROGRESS NOTE - NSTXSUICIDDATETRGT_PSY_ALL_CORE
----- Message from Huy Arreola MD sent at 9/16/2022 11:15 AM CDT -----  Please assist in bivalent covid vaccine in about one week     
lvm advising a rtn call in regards to booster  
18-Aug-2023
23-Aug-2023
21-Aug-2023
21-Aug-2023
23-Aug-2023
23-Aug-2023

## 2023-08-18 NOTE — BH INPATIENT PSYCHIATRY PROGRESS NOTE - CURRENT MEDICATION
MEDICATIONS  (STANDING):  amLODIPine   Tablet 10 milliGRAM(s) Oral daily  ARIPiprazole 15 milliGRAM(s) Oral daily  aspirin  chewable 81 milliGRAM(s) Oral daily  atorvastatin 40 milliGRAM(s) Oral at bedtime  benztropine 1 milliGRAM(s) Oral three times a day  clonazePAM  Tablet 1 milliGRAM(s) Oral three times a day  insulin lispro (ADMELOG) corrective regimen sliding scale   SubCutaneous three times a day before meals  methadone    Tablet 60 milliGRAM(s) Oral daily  nicotine - 21 mG/24Hr(s) Patch 1 Patch Transdermal daily  QUEtiapine 100 milliGRAM(s) Oral at bedtime    MEDICATIONS  (PRN):  acetaminophen     Tablet .. 650 milliGRAM(s) Oral every 6 hours PRN Mild Pain (1 - 3)  dextrose Oral Gel 15 Gram(s) Oral once PRN Blood Glucose LESS THAN 70 milliGRAM(s)/deciliter  ibuprofen  Tablet. 600 milliGRAM(s) Oral every 6 hours PRN Moderate Pain (4 - 6)  OLANZapine 5 milliGRAM(s) Oral every 6 hours PRN agitation  
MEDICATIONS  (STANDING):  amLODIPine   Tablet 10 milliGRAM(s) Oral daily  ARIPiprazole 15 milliGRAM(s) Oral daily  aspirin  chewable 81 milliGRAM(s) Oral daily  atorvastatin 40 milliGRAM(s) Oral at bedtime  benztropine 1 milliGRAM(s) Oral three times a day  clonazePAM  Tablet 1 milliGRAM(s) Oral three times a day  insulin lispro (ADMELOG) corrective regimen sliding scale   SubCutaneous three times a day before meals  methadone    Tablet 60 milliGRAM(s) Oral daily  nicotine - 21 mG/24Hr(s) Patch 1 Patch Transdermal daily  QUEtiapine 100 milliGRAM(s) Oral at bedtime    MEDICATIONS  (PRN):  acetaminophen     Tablet .. 650 milliGRAM(s) Oral every 6 hours PRN Mild Pain (1 - 3), Moderate Pain (4 - 6)  dextrose Oral Gel 15 Gram(s) Oral once PRN Blood Glucose LESS THAN 70 milliGRAM(s)/deciliter  OLANZapine 5 milliGRAM(s) Oral every 6 hours PRN agitation  
MEDICATIONS  (STANDING):  amLODIPine   Tablet 10 milliGRAM(s) Oral daily  ARIPiprazole 20 milliGRAM(s) Oral daily  aspirin  chewable 81 milliGRAM(s) Oral daily  atorvastatin 40 milliGRAM(s) Oral at bedtime  benztropine 1 milliGRAM(s) Oral three times a day  clonazePAM  Tablet 1 milliGRAM(s) Oral two times a day  insulin lispro (ADMELOG) corrective regimen sliding scale   SubCutaneous Before meals and at bedtime  methadone    Tablet 60 milliGRAM(s) Oral daily  nicotine - 21 mG/24Hr(s) Patch 1 Patch Transdermal daily  polyethylene glycol 3350 17 Gram(s) Oral at bedtime  QUEtiapine 100 milliGRAM(s) Oral at bedtime  senna 1 Tablet(s) Oral at bedtime    MEDICATIONS  (PRN):  acetaminophen     Tablet .. 650 milliGRAM(s) Oral every 6 hours PRN Mild Pain (1 - 3)  dextrose Oral Gel 15 Gram(s) Oral once PRN Blood Glucose LESS THAN 70 milliGRAM(s)/deciliter  ibuprofen  Tablet. 600 milliGRAM(s) Oral every 6 hours PRN Moderate Pain (4 - 6)  OLANZapine 5 milliGRAM(s) Oral every 6 hours PRN agitation  
MEDICATIONS  (STANDING):  amLODIPine   Tablet 10 milliGRAM(s) Oral daily  ARIPiprazole 20 milliGRAM(s) Oral daily  aspirin  chewable 81 milliGRAM(s) Oral daily  atorvastatin 40 milliGRAM(s) Oral at bedtime  benztropine 1 milliGRAM(s) Oral three times a day  clonazePAM  Tablet 1 milliGRAM(s) Oral two times a day  insulin lispro (ADMELOG) corrective regimen sliding scale   SubCutaneous Before meals and at bedtime  methadone    Tablet 60 milliGRAM(s) Oral daily  nicotine - 21 mG/24Hr(s) Patch 1 Patch Transdermal daily  QUEtiapine 100 milliGRAM(s) Oral at bedtime  senna 1 Tablet(s) Oral at bedtime    MEDICATIONS  (PRN):  acetaminophen     Tablet .. 650 milliGRAM(s) Oral every 6 hours PRN Mild Pain (1 - 3)  dextrose Oral Gel 15 Gram(s) Oral once PRN Blood Glucose LESS THAN 70 milliGRAM(s)/deciliter  ibuprofen  Tablet. 600 milliGRAM(s) Oral every 6 hours PRN Moderate Pain (4 - 6)  
MEDICATIONS  (STANDING):  amLODIPine   Tablet 10 milliGRAM(s) Oral daily  ARIPiprazole 20 milliGRAM(s) Oral daily  aspirin  chewable 81 milliGRAM(s) Oral daily  atorvastatin 40 milliGRAM(s) Oral at bedtime  benztropine 1 milliGRAM(s) Oral two times a day  clonazePAM  Tablet 1 milliGRAM(s) Oral two times a day  insulin lispro (ADMELOG) corrective regimen sliding scale   SubCutaneous Before meals and at bedtime  methadone    Tablet 60 milliGRAM(s) Oral daily  nicotine - 21 mG/24Hr(s) Patch 1 Patch Transdermal daily  polyethylene glycol 3350 17 Gram(s) Oral at bedtime  QUEtiapine 100 milliGRAM(s) Oral at bedtime  senna 1 Tablet(s) Oral at bedtime    MEDICATIONS  (PRN):  acetaminophen     Tablet .. 650 milliGRAM(s) Oral every 6 hours PRN Mild Pain (1 - 3)  dextrose Oral Gel 15 Gram(s) Oral once PRN Blood Glucose LESS THAN 70 milliGRAM(s)/deciliter  ibuprofen  Tablet. 600 milliGRAM(s) Oral every 6 hours PRN Moderate Pain (4 - 6)  OLANZapine 5 milliGRAM(s) Oral every 6 hours PRN agitation  
MEDICATIONS  (STANDING):  amLODIPine   Tablet 10 milliGRAM(s) Oral daily  ARIPiprazole 20 milliGRAM(s) Oral daily  aspirin  chewable 81 milliGRAM(s) Oral daily  atorvastatin 40 milliGRAM(s) Oral at bedtime  benztropine 1 milliGRAM(s) Oral three times a day  clonazePAM  Tablet 1 milliGRAM(s) Oral two times a day  insulin lispro (ADMELOG) corrective regimen sliding scale   SubCutaneous Before meals and at bedtime  methadone    Tablet 60 milliGRAM(s) Oral daily  nicotine - 21 mG/24Hr(s) Patch 1 Patch Transdermal daily  polyethylene glycol 3350 17 Gram(s) Oral at bedtime  QUEtiapine 100 milliGRAM(s) Oral at bedtime  senna 1 Tablet(s) Oral at bedtime    MEDICATIONS  (PRN):  acetaminophen     Tablet .. 650 milliGRAM(s) Oral every 6 hours PRN Mild Pain (1 - 3)  dextrose Oral Gel 15 Gram(s) Oral once PRN Blood Glucose LESS THAN 70 milliGRAM(s)/deciliter  ibuprofen  Tablet. 600 milliGRAM(s) Oral every 6 hours PRN Moderate Pain (4 - 6)  OLANZapine 5 milliGRAM(s) Oral every 6 hours PRN agitation

## 2023-08-18 NOTE — BH INPATIENT PSYCHIATRY PROGRESS NOTE - NSBHFUPINTERVALCCFT_PSY_A_CORE
"I'm ok, just some pain"
[place]
'Sometimes I want to be with my mother'  
'I heard that 2 people here are getting apartments'  
'can I stay until Wednesday'  
'I still hear voices'

## 2023-08-18 NOTE — BH INPATIENT PSYCHIATRY PROGRESS NOTE - NSBHCONSULTIPREASON_PSY_A_CORE
danger to self; mental illness expected to respond to inpatient care
other reason
danger to self; mental illness expected to respond to inpatient care
danger to self; mental illness expected to respond to inpatient care

## 2023-08-19 LAB
GLUCOSE BLDC GLUCOMTR-MCNC: 103 MG/DL — HIGH (ref 70–99)
GLUCOSE BLDC GLUCOMTR-MCNC: 104 MG/DL — HIGH (ref 70–99)
GLUCOSE BLDC GLUCOMTR-MCNC: 121 MG/DL — HIGH (ref 70–99)
GLUCOSE BLDC GLUCOMTR-MCNC: 80 MG/DL — SIGNIFICANT CHANGE UP (ref 70–99)

## 2023-08-19 RX ORDER — POLYETHYLENE GLYCOL 3350 17 G/17G
17 POWDER, FOR SOLUTION ORAL
Refills: 0 | Status: DISCONTINUED | OUTPATIENT
Start: 2023-08-19 | End: 2023-08-21

## 2023-08-19 RX ADMIN — Medication 650 MILLIGRAM(S): at 10:45

## 2023-08-19 RX ADMIN — Medication 1 MILLIGRAM(S): at 21:26

## 2023-08-19 RX ADMIN — Medication 1 PATCH: at 10:15

## 2023-08-19 RX ADMIN — QUETIAPINE FUMARATE 100 MILLIGRAM(S): 200 TABLET, FILM COATED ORAL at 21:26

## 2023-08-19 RX ADMIN — Medication 5 MILLIGRAM(S): at 16:49

## 2023-08-19 RX ADMIN — ARIPIPRAZOLE 20 MILLIGRAM(S): 15 TABLET ORAL at 10:17

## 2023-08-19 RX ADMIN — METHADONE HYDROCHLORIDE 60 MILLIGRAM(S): 40 TABLET ORAL at 10:18

## 2023-08-19 RX ADMIN — Medication 81 MILLIGRAM(S): at 10:17

## 2023-08-19 RX ADMIN — Medication 600 MILLIGRAM(S): at 16:00

## 2023-08-19 RX ADMIN — Medication 600 MILLIGRAM(S): at 15:30

## 2023-08-19 RX ADMIN — Medication 1 PATCH: at 10:19

## 2023-08-19 RX ADMIN — AMLODIPINE BESYLATE 10 MILLIGRAM(S): 2.5 TABLET ORAL at 10:18

## 2023-08-19 RX ADMIN — SENNA PLUS 1 TABLET(S): 8.6 TABLET ORAL at 21:26

## 2023-08-19 RX ADMIN — Medication 1 MILLIGRAM(S): at 10:18

## 2023-08-19 RX ADMIN — Medication 650 MILLIGRAM(S): at 10:15

## 2023-08-19 RX ADMIN — POLYETHYLENE GLYCOL 3350 17 GRAM(S): 17 POWDER, FOR SOLUTION ORAL at 21:28

## 2023-08-19 RX ADMIN — Medication 1 PATCH: at 18:11

## 2023-08-19 RX ADMIN — Medication 1 PATCH: at 08:16

## 2023-08-19 RX ADMIN — OLANZAPINE 5 MILLIGRAM(S): 15 TABLET, FILM COATED ORAL at 22:06

## 2023-08-19 RX ADMIN — ATORVASTATIN CALCIUM 40 MILLIGRAM(S): 80 TABLET, FILM COATED ORAL at 21:26

## 2023-08-20 LAB
GLUCOSE BLDC GLUCOMTR-MCNC: 109 MG/DL — HIGH (ref 70–99)
GLUCOSE BLDC GLUCOMTR-MCNC: 86 MG/DL — SIGNIFICANT CHANGE UP (ref 70–99)
GLUCOSE BLDC GLUCOMTR-MCNC: 95 MG/DL — SIGNIFICANT CHANGE UP (ref 70–99)
GLUCOSE BLDC GLUCOMTR-MCNC: 98 MG/DL — SIGNIFICANT CHANGE UP (ref 70–99)

## 2023-08-20 RX ORDER — OLANZAPINE 15 MG/1
5 TABLET, FILM COATED ORAL ONCE
Refills: 0 | Status: COMPLETED | OUTPATIENT
Start: 2023-08-20 | End: 2023-08-20

## 2023-08-20 RX ORDER — HYDROXYZINE HCL 10 MG
50 TABLET ORAL ONCE
Refills: 0 | Status: DISCONTINUED | OUTPATIENT
Start: 2023-08-20 | End: 2023-08-20

## 2023-08-20 RX ADMIN — Medication 1 PATCH: at 10:31

## 2023-08-20 RX ADMIN — Medication 1 MILLIGRAM(S): at 20:59

## 2023-08-20 RX ADMIN — Medication 81 MILLIGRAM(S): at 10:31

## 2023-08-20 RX ADMIN — METHADONE HYDROCHLORIDE 60 MILLIGRAM(S): 40 TABLET ORAL at 10:30

## 2023-08-20 RX ADMIN — Medication 1 PATCH: at 07:06

## 2023-08-20 RX ADMIN — Medication 1 MILLIGRAM(S): at 10:31

## 2023-08-20 RX ADMIN — Medication 1 MILLIGRAM(S): at 20:56

## 2023-08-20 RX ADMIN — QUETIAPINE FUMARATE 100 MILLIGRAM(S): 200 TABLET, FILM COATED ORAL at 20:56

## 2023-08-20 RX ADMIN — ARIPIPRAZOLE 20 MILLIGRAM(S): 15 TABLET ORAL at 10:31

## 2023-08-20 RX ADMIN — Medication 1 PATCH: at 18:37

## 2023-08-20 RX ADMIN — ATORVASTATIN CALCIUM 40 MILLIGRAM(S): 80 TABLET, FILM COATED ORAL at 20:56

## 2023-08-20 RX ADMIN — AMLODIPINE BESYLATE 10 MILLIGRAM(S): 2.5 TABLET ORAL at 10:31

## 2023-08-20 RX ADMIN — SENNA PLUS 1 TABLET(S): 8.6 TABLET ORAL at 20:56

## 2023-08-20 RX ADMIN — OLANZAPINE 5 MILLIGRAM(S): 15 TABLET, FILM COATED ORAL at 18:53

## 2023-08-20 RX ADMIN — Medication 1 PATCH: at 11:30

## 2023-08-21 VITALS
RESPIRATION RATE: 18 BRPM | OXYGEN SATURATION: 96 % | DIASTOLIC BLOOD PRESSURE: 83 MMHG | SYSTOLIC BLOOD PRESSURE: 125 MMHG | HEART RATE: 63 BPM | TEMPERATURE: 98 F

## 2023-08-21 LAB
GLUCOSE BLDC GLUCOMTR-MCNC: 108 MG/DL — HIGH (ref 70–99)
GLUCOSE BLDC GLUCOMTR-MCNC: 92 MG/DL — SIGNIFICANT CHANGE UP (ref 70–99)

## 2023-08-21 PROCEDURE — 99238 HOSP IP/OBS DSCHRG MGMT 30/<: CPT

## 2023-08-21 RX ORDER — NICOTINE POLACRILEX 2 MG
1 GUM BUCCAL
Qty: 30 | Refills: 0
Start: 2023-08-21 | End: 2023-09-19

## 2023-08-21 RX ORDER — ATORVASTATIN CALCIUM 80 MG/1
1 TABLET, FILM COATED ORAL
Qty: 0 | Refills: 0 | DISCHARGE
Start: 2023-08-21

## 2023-08-21 RX ORDER — ASPIRIN/CALCIUM CARB/MAGNESIUM 324 MG
1 TABLET ORAL
Qty: 14 | Refills: 0
Start: 2023-08-21 | End: 2023-09-03

## 2023-08-21 RX ORDER — ASPIRIN/CALCIUM CARB/MAGNESIUM 324 MG
1 TABLET ORAL
Qty: 0 | Refills: 0 | DISCHARGE
Start: 2023-08-21

## 2023-08-21 RX ORDER — QUETIAPINE FUMARATE 200 MG/1
1 TABLET, FILM COATED ORAL
Qty: 14 | Refills: 0
Start: 2023-08-21 | End: 2023-09-03

## 2023-08-21 RX ORDER — POLYETHYLENE GLYCOL 3350 17 G/17G
17 POWDER, FOR SOLUTION ORAL
Qty: 1020 | Refills: 0
Start: 2023-08-21 | End: 2023-09-19

## 2023-08-21 RX ORDER — ARIPIPRAZOLE 15 MG/1
1 TABLET ORAL
Qty: 0 | Refills: 0 | DISCHARGE
Start: 2023-08-21

## 2023-08-21 RX ORDER — CLONAZEPAM 1 MG
1 TABLET ORAL
Qty: 0 | Refills: 0 | DISCHARGE
Start: 2023-08-21

## 2023-08-21 RX ORDER — QUETIAPINE FUMARATE 200 MG/1
1 TABLET, FILM COATED ORAL
Qty: 0 | Refills: 0 | DISCHARGE
Start: 2023-08-21

## 2023-08-21 RX ORDER — POLYETHYLENE GLYCOL 3350 17 G/17G
17 POWDER, FOR SOLUTION ORAL
Qty: 0 | Refills: 0 | DISCHARGE
Start: 2023-08-21

## 2023-08-21 RX ORDER — ARIPIPRAZOLE 15 MG/1
1 TABLET ORAL
Qty: 14 | Refills: 0
Start: 2023-08-21 | End: 2023-09-03

## 2023-08-21 RX ORDER — ATORVASTATIN CALCIUM 80 MG/1
1 TABLET, FILM COATED ORAL
Qty: 14 | Refills: 0
Start: 2023-08-21 | End: 2023-09-03

## 2023-08-21 RX ORDER — METHADONE HYDROCHLORIDE 40 MG/1
6 TABLET ORAL
Qty: 0 | Refills: 0 | DISCHARGE
Start: 2023-08-21

## 2023-08-21 RX ORDER — BENZTROPINE MESYLATE 1 MG
1 TABLET ORAL
Qty: 28 | Refills: 0
Start: 2023-08-21 | End: 2023-09-03

## 2023-08-21 RX ORDER — NICOTINE POLACRILEX 2 MG
1 GUM BUCCAL
Qty: 0 | Refills: 0 | DISCHARGE
Start: 2023-08-21

## 2023-08-21 RX ORDER — AMLODIPINE BESYLATE 2.5 MG/1
1 TABLET ORAL
Qty: 0 | Refills: 0 | DISCHARGE
Start: 2023-08-21

## 2023-08-21 RX ORDER — BENZTROPINE MESYLATE 1 MG
1 TABLET ORAL
Qty: 0 | Refills: 0 | DISCHARGE
Start: 2023-08-21

## 2023-08-21 RX ORDER — SENNA PLUS 8.6 MG/1
1 TABLET ORAL
Qty: 0 | Refills: 0 | DISCHARGE
Start: 2023-08-21

## 2023-08-21 RX ORDER — CLONAZEPAM 1 MG
1 TABLET ORAL
Qty: 28 | Refills: 0
Start: 2023-08-21 | End: 2023-09-03

## 2023-08-21 RX ORDER — AMLODIPINE BESYLATE 2.5 MG/1
1 TABLET ORAL
Qty: 14 | Refills: 0
Start: 2023-08-21 | End: 2023-09-03

## 2023-08-21 RX ORDER — SENNA PLUS 8.6 MG/1
1 TABLET ORAL
Qty: 14 | Refills: 0
Start: 2023-08-21 | End: 2023-09-03

## 2023-08-21 RX ADMIN — Medication 1 PATCH: at 07:24

## 2023-08-21 RX ADMIN — Medication 1 PATCH: at 10:15

## 2023-08-21 RX ADMIN — Medication 1 MILLIGRAM(S): at 10:32

## 2023-08-21 RX ADMIN — ARIPIPRAZOLE 20 MILLIGRAM(S): 15 TABLET ORAL at 10:30

## 2023-08-21 RX ADMIN — Medication 81 MILLIGRAM(S): at 10:30

## 2023-08-21 RX ADMIN — AMLODIPINE BESYLATE 10 MILLIGRAM(S): 2.5 TABLET ORAL at 10:30

## 2023-08-21 RX ADMIN — METHADONE HYDROCHLORIDE 60 MILLIGRAM(S): 40 TABLET ORAL at 10:30

## 2023-08-21 NOTE — BH INPATIENT PSYCHIATRY DISCHARGE NOTE - OTHER PAST PSYCHIATRIC HISTORY (INCLUDE DETAILS REGARDING ONSET, COURSE OF ILLNESS, INPATIENT/OUTPATIENT TREATMENT)
52 year old male, single, undomiciled, unemployed with PPH schizoaffective disorder bipolar type, SIMD, Cluster-B Personality Traits, Polysubstance Use Disorder (Tobacco, Alcohol, Cocaine, Stimulant), Opioid Use Disorder (currently prescribed Methadone 60mg daily at [facility]), history of prior inpatient psychiatric hospitalizations most recently at [facility] (02/21/22 – 02/24/22) for SIMD, currently engaged in outpatient psychiatric treatment at [facility], patient-reported history of suicide attempts, no history of NSSIB, no history of violence, history of trauma, initially presenting to [facility] ED on the evening of 08/09/23 complaining of intermittent, exertional, non-radiating, substernal chest pain x 1 day and admitted to [facility] medicine for cardiac telemetry, now reporting suicidal ideation when informed of plan for discharge and admitted voluntarily to [facility] for safety, medication optimization and psychiatric stabilization    PSYCKES:  MEDICAID ID: [id number]  [place] FOR SI: SI x11 (First Date 01/14/2012) most recently at [facility] ED (02/19/22), Treatment for suicide attempt & Self-Inflicted Poisoning (04/04/2017) at [facility] ED, history of opioid overdose (01/13/22) at [facility] ED  SOCIAL DETERMINANTS OF HEALTH (SDOH): Disruption Of Family By Separation And Divorce, Unemployment, Unspecified, Food Insecurity Sheltered Homelessness Other Specified Lack Of Adequate Food Homelessness Unspecified  QUALITY FLAGS: High Utilization - Inpt/ER, Polypharmacy (Antipsychotic Two Plus  Psychotropics Four Plus Aripiprazole + Clonazepam + Clonidine Hcl + Doxepin Hcl + Gabapentin + Olanzapine + Quetiapine Fumarate), Medication Assisted Treatment (MAT) for Opioid Use Disorder (OUD) Not Sustained 6 Months  No Utilization of Pharmacotherapy for Alcohol Abuse or Dependence, Low Antipsychotic Medication Adherence - Schizophrenia  BEHAVIORAL HEALTH DIAGNOSES: Opioid related disorders Schizoaffective Disorder Other psychoactive substance related disorders Schizophrenia Borderline Personality Disorder Unspecified/Other Anxiety Disorder Adjustment Disorder Cocaine related disorders Alcohol related disorders Sedative, hypnotic, or anxiolytic related disorders Substance-Induced Depressive Disorder Tobacco related disorder Bipolar I Other stimulant related disorders Panic Disorder Unspecified/Other Bipolar Unspecified/Other Depressive Disorder Unspecified/Other Personality Disorder  BEHAVIORAL HEALTH MEDICATIONS: Clonazepam 1 mg PO TID (09/02/22 – 06/19/23), Zolpidem Tartrate 10 mg PO daily (08/23/18 – 12/04/19), Buprenorphine Hydrochloride Injection 0.1 mg (07/31/18 – 10/17/19), Clonidine HCl 0.3 mg PO TID (01/12/22 – 06/28/23), Gabapentin 100 mg PO TID (07/25/22 – 06/28/23), Doxepin HCL 50 mg PO BID (09/02/22 – 06/20/23), Aripiprazole 15 mg PO daily (10/03/22 – 06/16/23), Quetiapine Fumarate 100 mg PO daily (05/22/23 – 06/16/23), Olanzapine 5 mg PO daily (09/02/22 – 04/14/23), Olanzapine 20 mg PO daily (09/02/22 – 04/14/23), Amitriptyline HCL 50 mg Po daily (01/21/22), Hydroxyzine Pamoate 50 mg PO QID (12/15/21 – 01/14/22), lorazepam 1 mg PO QID (01/14/22), Nicotine Polacrilex  4 mg/27.5 daily (01/14/22)  OUTPATIENT: [facility] (11/01/21 – 07/13/23) for Opioid Dependence Uncomplicated, [facility] (10/21/21 – 06/16/23) for Schizoaffective Disorder, bipolar type  INPATIENT: MH x1 most recently at [facility] (02/21/22 – 02/24/22) for Other Psychoactive Substance Abuse with Psychoactive SIMD  ED: MH x2 most recently at [facility] (06/20/23-06/21/23) for Schizoaffective Disorder Unspecified, and KIM x3 most recently at [facility] (11/04/22) for Opioid Abuse Uncomplicated, [facility] (02/29/22) for Opioid Use Unspecified Uncomplicated, [facility] (01/13/22) for Poisoning by Unspecified Narcotics Accidental

## 2023-08-21 NOTE — BH INPATIENT PSYCHIATRY DISCHARGE NOTE - GENERAL APPEARANCE
No deformities present
49y female presents w/ dizziness and visual changes x10 days. Pt had a stroke in November of 2021 w/ right sided residual weakness (which has now resolved after therapy). Pt has no facial droop, extremity weakness, extremity drift. Equal arm strength. A&Ox4, spon resps on Ra, ambulatory w/ a steady gait.

## 2023-08-21 NOTE — BH INPATIENT PSYCHIATRY DISCHARGE NOTE - NSDCMRMEDTOKEN_GEN_ALL_CORE_FT
amLODIPine 10 mg oral tablet: 1 tab(s) orally once a day  ARIPiprazole 20 mg oral tablet: 1 tab(s) orally once a day  aspirin 81 mg oral tablet, chewable: 1 tab(s) orally once a day  atorvastatin 40 mg oral tablet: 1 tab(s) orally once a day (at bedtime)  benztropine 1 mg oral tablet: 1 tab(s) orally 2 times a day  clonazePAM 1 mg oral tablet: 1 tab(s) orally 2 times a day  methadone 10 mg oral tablet: 6 tab(s) orally once a day  nicotine 21 mg/24 hr transdermal film, extended release: 1 patch transdermal once a day as needed for NRT  polyethylene glycol 3350 oral powder for reconstitution: 17 gram(s) orally 2 times a day  QUEtiapine 100 mg oral tablet: 1 tab(s) orally once a day (at bedtime)  senna leaf extract oral tablet: 1 tab(s) orally once a day (at bedtime)   amLODIPine 10 mg oral tablet: 1 tab(s) orally once a day  ARIPiprazole 20 mg oral tablet: 1 tab(s) orally once a day  aspirin 81 mg oral tablet, chewable: 1 tab(s) orally once a day  atorvastatin 40 mg oral tablet: 1 tab(s) orally once a day (at bedtime)  benztropine 1 mg oral tablet: 1 tab(s) orally 2 times a day  clonazePAM 1 mg oral tablet: 1 tab(s) orally 2 times a day MDD: 2  methadone 10 mg oral tablet: 6 tab(s) orally once a day  nicotine 21 mg/24 hr transdermal film, extended release: 1 patch transdermal once a day as needed for NRT  polyethylene glycol 3350 oral powder for reconstitution: 17 gram(s) orally 2 times a day  QUEtiapine 100 mg oral tablet: 1 tab(s) orally once a day (at bedtime)  senna leaf extract oral tablet: 1 tab(s) orally once a day (at bedtime)

## 2023-08-21 NOTE — BH DISCHARGE NOTE NURSING/SOCIAL WORK/PSYCH REHAB - NSDCVIVACCINE_GEN_ALL_CORE_FT
Tdap; 26-Feb-2016 23:27; Yoselin Gardner (ELIZABETH); [employer]; [id number]; IntraMuscular; Deltoid Right.; 0.5 milliLiter(s); VIS (VIS Published: 09-May-2013, VIS Presented: 26-Feb-2016);

## 2023-08-21 NOTE — BH DISCHARGE NOTE NURSING/SOCIAL WORK/PSYCH REHAB - NSCDUDCCRISIS_PSY_A_CORE
[facility]  1 [phone]) [facility] ([phone][facility]  Text "WELL" to 64744  Website: www.[employer]/.National Suicide Prevention Lifeline 5 [phone] [phone](645) 676-9484/988 Suicide and Crisis Lifeline

## 2023-08-21 NOTE — BH DISCHARGE NOTE NURSING/SOCIAL WORK/PSYCH REHAB - FACILITY ADDRESS
[address] [place] [id number] / [phone] / Director: [email or web address]

## 2023-08-21 NOTE — BH DISCHARGE NOTE NURSING/SOCIAL WORK/PSYCH REHAB - NSTOBACCOOTHER_PSY_ALL_CORE_FT
Call [facility] Smokers' Quitline at [phone] ([phone]) or visit [email or web address] normal...

## 2023-08-21 NOTE — BH DISCHARGE NOTE NURSING/SOCIAL WORK/PSYCH REHAB - PATIENT PORTAL LINK FT
You can access the FollowMyHealth Patient Portal offered by [facility] by registering at the following website: [email or web address]. By joining [employer]’s FollowMyHealth portal, you will also be able to view your health information using other applications (apps) compatible with our system.

## 2023-08-21 NOTE — BH INPATIENT PSYCHIATRY DISCHARGE NOTE - HOSPITAL COURSE
Upon admission, pt was continued on medications from the medical unit (Abilify was later increased to 20mg qd). He was noted to be quite focused on housing and requested to be sent to [place] 'to get out of the city.' He reported not wanting to return to his sister's and did not give treatment team permission to contact his sister or any other collateral source. He would frequently endorse conditional si in relation to housing or express worsening on his auditory hallucinations. He began to express desire for rehab in [place] citing wanting to get off of methadone, but when discussion was started regarding tapering his discharge here on the unit, he declined taper and no longer wanted rehab. He later shared that he had plans to get a room, but was waiting until 8/26/23 when his monthly check would be deposited into his account. He requested to remain in the hospital until the 26th.   No acute medical concerns during admission. Visible on the unit, seen appropriately interacting with peers and staff.

## 2023-08-21 NOTE — BH TREATMENT PLAN - NSTXSUICIDINTERRN_PSY_ALL_CORE
facilitate trust relation with patient. brief frequent encounters to check on patient. encourage vent of feelings
facilitate trust relation with patient. brief frequent encounters to check on patient. encourage vent of feelings
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
## 2023-08-21 NOTE — BH TREATMENT PLAN - NSDCCRITERIA_PSY_ALL_CORE
Decrease in intensity and frequency of SI, improvement of depressive symptoms
Decrease in intensity and frequency of SI, improvement of depressive symptoms

## 2023-08-21 NOTE — BH DISCHARGE NOTE NURSING/SOCIAL WORK/PSYCH REHAB - NSDCOTHERTYPOFSERV_GEN_ALL_CORE
At any hour of any day, in almost any language, from phone, tablet or computer, [facility] is your connection to get the help you need:    Suicide prevention and crisis counseling  Peer support and short-term counseling via telephone, text and web  Referrals and warm transfer to other services  Follow-up to check that you have connected to care and it is working for you

## 2023-08-21 NOTE — BH INPATIENT PSYCHIATRY DISCHARGE NOTE - HPI (INCLUDE ILLNESS QUALITY, SEVERITY, DURATION, TIMING, CONTEXT, MODIFYING FACTORS, ASSOCIATED SIGNS AND SYMPTOMS)
52 year old male, single, undomiciled, unemployed with PMH HTN, HLD, DM2, CAD s/p stents and PPH schizoaffective disorder bipolar type, SIMD, Cluster-B Personality Traits, Polysubstance Use Disorder (Tobacco, Alcohol, Cocaine, Stimulant), Opioid Use Disorder ([facility]), history of prior inpatient psychiatric hospitalizations most recently at [facility] (02/21/22 – 02/24/22) for SIMD, currently engaged in outpatient psychiatric treatment at [facility], patient-reported history of suicide attempts, no history of NSSIB, no history of violence, history of trauma, initially presenting to [facility] ED on the evening of 08/09/23 complaining of intermittent, exertional, non-radiating, substernal chest pain x 1 day and admitted to [facility] medicine for cardiac telemetry, now reporting suicidal ideation when informed of plan for discharge and admitted voluntarily to [facility] for safety, medication optimization and psychiatric stabilization.    On evaluation, the patient is calm, cooperative, fairly-related, soft-spoken with fair eye contact, constricted affect and demonstrating good behavioral control and linear thought processes.  The patient states that he wanted to be admitted so that he can "work on getting better."  The patient then expresses that he heard that his sister and nephew were killed prior to his admission but states that he has since confirmed that they are safe.  The patient states that he has been depressed because he does not "know what [he] is doing."  He states that he wants to move out of [facility] to make a better life for himself.  He states that, prior to his admission, he "tried to climb a fence near [address] to jump" but states that he could not climb it because of the difficulty.  He denies auditory hallucinations but reports seeing "shadows" at night.  He also reports vague paranoia stating "I always feel people are after me but I don't know who."  The patient states that he hopes that social work can "get [him] into a [facility] program [facility]."  The patient currently denies SI/HI, intent, plan and AVH.  The patient commits to safety while on [facility].  All questions and concerns addressed.      COLLATERAL:    Catherine Person (Sister): [phone]

## 2023-08-21 NOTE — BH TREATMENT PLAN - NSTXSUICIDINTERPR_PSY_ALL_CORE
Pt will be invited and encouraged to attend all offered groups
Pt. will continue to be invited and encouraged to attend all offered groups

## 2023-08-21 NOTE — BH INPATIENT PSYCHIATRY DISCHARGE NOTE - DESCRIPTION
The patient was born in [facility] and raised by his mother along with five siblings.  He states that he went to [employer] until the 7th grade when his mother then moved the family to [place] to help their aging maternal grandfather.  He states that he lived in [place] for 11 years.  He states that he dropped out of high school but achieved a GED.  He states that he moved back to [place] when he was 21 years old.  He reports that he is unemployed and receives SSI/SSD.  He was previously domiciled with a friend for approximately one month prior to this hospitalization.  He identifies as [employer]ai.

## 2023-08-21 NOTE — BH INPATIENT PSYCHIATRY DISCHARGE NOTE - REASON FOR ADMISSION
Patient expressed SI when he was informed by medicine team that he was medically cleared for discharge from cardiac telemetry.

## 2023-08-21 NOTE — BH INPATIENT PSYCHIATRY DISCHARGE NOTE - MSE OPTIONS
Procedure: RLE Angiogram, reposition infusion cath into distal calf. tPA bolus 2mg.  Angio found persistent thrombus in bypass and no flow into foot.     Diagnosis/Indication: Thrombosed RLE bypass    Surgeon: Edwin    S: Pt c/o back pain.  Ankle stiffness but no pain.     O:  T(C): 36.4 (11-18-20 @ 09:45), Max: 36.4 (11-18-20 @ 09:45)  T(F): 97.6 (11-18-20 @ 09:45), Max: 97.6 (11-18-20 @ 09:45)  HR: 53 (11-18-20 @ 11:00) (53 - 63)  BP: --  RR: 12 (11-18-20 @ 11:00) (12 - 13)  SpO2: 97% (11-18-20 @ 11:00) (97% - 99%)  Wt(kg): --                        13.5   14.11 )-----------( 189      ( 18 Nov 2020 04:15 )             40.6     11-18    136  |  102  |  11  ----------------------------<  118<H>  4.3   |  24  |  0.91    Ca    8.8      18 Nov 2020 04:15  Phos  3.4     11-18  Mg     1.9     11-18        Gen: 60 y/o m Awake, alert, NAD  C/V:   Pulm:   Abd:   Extremities: Left groin hematoma stable from last PM.   RLE: warm to ankle. Foot cool. Can flex and extend ankle. Forefoot strength 4/5 Plantar and dorsiflexion against resistance.   Bypass pulse 2+. No DP/AT/PT signal.  Venous signal noted over PT.       A/P: 62yMale s/p RLE angio and infusion cath reposition and tPA bolus 2mg.  Will infuse tPA for 24 hrs.   Diet: Clears  IVF: NS 80cc/hr  Heparin drip: Heparin 500units/hr  Alteplase 1mg/hr  Pain/nausea control   Structured MSE

## 2023-08-21 NOTE — BH INPATIENT PSYCHIATRY DISCHARGE NOTE - NSDCCPCAREPLAN_GEN_ALL_CORE_FT
PRINCIPAL DISCHARGE DIAGNOSIS  Diagnosis: Schizoaffective disorder  Assessment and Plan of Treatment: Continue current medication regimen and follow up with aftercare planning      SECONDARY DISCHARGE DIAGNOSES  Diagnosis: Cluster B personality disorder in adult  Assessment and Plan of Treatment: Continue current medication regimen and follow up with aftercare planning

## 2023-08-21 NOTE — BH DISCHARGE NOTE NURSING/SOCIAL WORK/PSYCH REHAB - NSDCADDINFO1FT_PSY_ALL_CORE
Psychiatry appointment scheduled with established provider Dr Rosen on Tuesday 8/22 at 9:30am. This is an IN-PERSON appointment.

## 2023-08-21 NOTE — BH INPATIENT PSYCHIATRY DISCHARGE NOTE - NSBHASSESSSUMMFT_PSY_ALL_CORE
52 year old male, single, undomiciled, unemployed with PMH HTN, HLD, DM2, CAD s/p stents and PPH schizoaffective disorder bipolar type, SIMD, Cluster-B Personality Traits, Polysubstance Use Disorder (Tobacco, Alcohol, Cocaine, Stimulant), Opioid Use Disorder (currently prescribed [facility]), history of prior inpatient psychiatric hospitalizations most recently at [facility] (02/21/22 – 02/24/22) for SIMD, currently engaged in outpatient psychiatric treatment at [facility], patient-reported history of suicide attempts, no history of NSSIB, no history of violence, initially presenting to [facility] ED on the evening of 08/09/23 complaining of intermittent, exertional, non-radiating, substernal chest pain x 1 day and admitted to [facility] medicine for cardiac telemetry, now reporting suicidal ideation when informed of plan for discharge and admitted voluntarily to [address] for safety, medication optimization and psychiatric stabilization.    On evaluation, the patient is calm, cooperative, fairly-related, soft-spoken with fair eye contact, constricted affect and demonstrating good behavioral control and linear thought processes.  On initial interview by  Psychiatry, the patient reported low mood, SI with intent, a recent history of a suicide attempt and was unable to contract for safety.  The patient also reported a history of auditory hallucinations, although, he currently denies them.  Differential includes Schizoaffective Disorder vs SIMD vs Adjustment Disorder with Disturbance of Conduct.  The patient would benefit from a voluntary inpatient psychiatric hospitalization for safety, medication optimization, psychiatric stabilization, diagnostic clarification and coordination with outpatient providers.      Plan:    #Schizophrenia vs Schizoaffective Disorder vs SIMD vs Adjustment Disorder with Disturbance of Conduct:  ·  Admit Voluntarily to [address]  ·  CO 1:1 not psychiatrically indicated at this time; q15 min checks  ·  Most recent qtc 479 ms  ·  Increase Abilify to 20mg mg PO daily  ·  Continue benztropine (Cogentin) 1 mg PO TID  ·  decrease clonazepam (Klonopin) from 1 mg PO TID to 1mg bid  ·  Hold home Doxepin 100 mg PO BID given non-formulary  ·  Continue quetiapine (Seroquel) 100 mg PO qHs  ·  PRNs: Olanzapine 5 mg PO q6h prn, with escalation to IM if patient refusing PO and remains an imminent danger to self or others; if IM antipsychotic is administered, please perform follow-up ECG for QTc monitoring (<500).  · Obtain collateral from Catherine Person (Sister): [phone]- pt continually declined to give permission    #Polysubstance Use Disorder:  #Tobacco Use Disorder:  #Opioid Use Disorder:   ·  Continue Methadone 60mg daily for opioid dependence  ·  Plan for follow-up at [facility] upon discharge ([phone]) for management of Opioid Use Disorder  ·  c/w nicotine 21mg/24h patch transdermal daily for NRT    #Essential Hypertension:  ·  Continue amlodipine 10 mg PO daily    #Hyperlipidemia (HLD):   -Continue Atorvastatin 40mg PO daily  -Lipid Panel (08/10/23): Cholesterol 110, Triglycerides 66, HDL 49, Non-HDL 61, LDL 48     #Diabetes mellitus, type 2:  ·  Hgb A1c (08/10/23): 5.4  ·  Monitor FS Blood Glucose  ·  Continue Insulin Lispro Med dose sliding scale  (past hx on Metformin 500mg daily) per Cardiology recommendations    #CAD (coronary artery disease):  #Paroxysmal atrial fibrillation:  ·  Most recent ECG demonstrates sinus rhythm  Continue ASA 81mg daily

## 2023-08-21 NOTE — BH INPATIENT PSYCHIATRY DISCHARGE NOTE - NSBHMETABOLIC_PSY_ALL_CORE_FT
BMI: BMI (kg/m2): 28.4 (08-10-23 @ 00:22)  HbA1c: A1C with Estimated Average Glucose Result: 5.4 % (08-10-23 @ 05:30)    Glucose: POCT Blood Glucose.: 92 mg/dL (08-21-23 @ 07:20)    BP: 125/83 (08-21-23 @ 08:59) (125/83 - 153/93)  Lipid Panel: Date/Time: 08-10-23 @ 05:30  Cholesterol, Serum: 110  Direct LDL: --  HDL Cholesterol, Serum: 49  Total Cholesterol/HDL Ration Measurement: --  Triglycerides, Serum: 66

## 2023-08-21 NOTE — BH TREATMENT PLAN - NSTXPLANTHERAPYSESSIONSFT_PSY_ALL_CORE
08-16-23  Type of therapy: Music therapy  Type of session: Group  Level of patient participation: Participates  Duration of participation: 45 minutes  Therapy conducted by: Psych rehab  Therapy Summary: Pt attended music therapy and participated fully and appropriately.  Pt played instruments throughout the group and demonstrated a strong and organized sense of rhythm in his playing.  Pt engaged in group discussion about the music and engaged well with others.

## 2023-08-21 NOTE — BH INPATIENT PSYCHIATRY DISCHARGE NOTE - NSBHFUPINTERVALHXFT_PSY_A_CORE
Patient visible on the milieu, seen today in tv area watching tv with peers. Noted to be dressed in his own clothing. Focused on discharge, states that he believes that he will be able to stay with a friend for the next 5 days until his check clears on 8/26/23. No si/hi/avh or PI endorsed. No acute medical concerns. He is future oriented and anticipating continuing treatment outpatient. He informs writer that he will be going to his methadone clinic tomorrow to continue care. Has no complaints or concerns at this time.

## 2023-08-22 NOTE — BH SOCIAL WORK CONFIRMATION FOLLOW UP NOTE - NSLINKEDTOLOC_PSY_ALL_CORE
[facility]  22-Aug-2023 09:30  [address]  [place] [address]  [phone]

## 2023-08-22 NOTE — BH SOCIAL WORK CONFIRMATION FOLLOW UP NOTE - NSCOMMENTS_PSY_ALL_CORE
CARING CALL 8/22/23: SW called number on file but female picked up and stated wrong number.     LINKAGE CALL 8/22/23: SW called clinic above and spoke with Yvrose who confirmed pt attend appointment.  CARING CALL 8/22/23: SW called number on file but female picked up and stated wrong number.     LINKAGE CALL 8/22/23: SW called clinic above and spoke with Yvrose who confirmed pt attended appointment.

## 2023-08-22 NOTE — ED ADULT NURSE NOTE - GENITOURINARY WDL
Called patient to let her know recommendation from Abigail NP and she was agreeable but did also want to mention the a couple days ago she was pulling weeds and had horrible chest pain. She had lost consciousness and woke up on the ground. When her 's therapy dog brought her  to her. She said that her chest hurt the rest of the night but she did not go in to UC or ED to be evaluated, she just laid on the couch and went to sleep for the night.     Will update NP for any further recommendations and will also have  reach out to set up appt for Event monitor.    Bladder non-tender and non-distended. Urine clear yellow.

## 2023-08-24 DIAGNOSIS — F17.290 NICOTINE DEPENDENCE, OTHER TOBACCO PRODUCT, UNCOMPLICATED: ICD-10-CM

## 2023-08-24 DIAGNOSIS — I48.0 PAROXYSMAL ATRIAL FIBRILLATION: ICD-10-CM

## 2023-08-24 DIAGNOSIS — E11.9 TYPE 2 DIABETES MELLITUS WITHOUT COMPLICATIONS: ICD-10-CM

## 2023-08-24 DIAGNOSIS — F25.9 SCHIZOAFFECTIVE DISORDER, UNSPECIFIED: ICD-10-CM

## 2023-08-24 DIAGNOSIS — F11.90 OPIOID USE, UNSPECIFIED, UNCOMPLICATED: ICD-10-CM

## 2023-08-24 DIAGNOSIS — I25.10 ATHEROSCLEROTIC HEART DISEASE OF NATIVE CORONARY ARTERY WITHOUT ANGINA PECTORIS: ICD-10-CM

## 2023-08-24 DIAGNOSIS — F60.89 OTHER SPECIFIC PERSONALITY DISORDERS: ICD-10-CM

## 2023-08-24 DIAGNOSIS — Z95.5 PRESENCE OF CORONARY ANGIOPLASTY IMPLANT AND GRAFT: ICD-10-CM

## 2023-08-24 DIAGNOSIS — Z88.8 ALLERGY STATUS TO OTHER DRUGS, MEDICAMENTS AND BIOLOGICAL SUBSTANCES: ICD-10-CM

## 2023-08-24 DIAGNOSIS — I10 ESSENTIAL (PRIMARY) HYPERTENSION: ICD-10-CM

## 2023-08-24 DIAGNOSIS — F19.10 OTHER PSYCHOACTIVE SUBSTANCE ABUSE, UNCOMPLICATED: ICD-10-CM

## 2023-09-08 ENCOUNTER — EMERGENCY (EMERGENCY)
Facility: HOSPITAL | Age: 53
LOS: 1 days | Discharge: HOME | End: 2023-09-08
Attending: EMERGENCY MEDICINE | Admitting: EMERGENCY MEDICINE
Payer: MEDICAID

## 2023-09-08 VITALS
WEIGHT: 179.9 LBS | TEMPERATURE: 98 F | HEIGHT: 66 IN | OXYGEN SATURATION: 100 % | SYSTOLIC BLOOD PRESSURE: 142 MMHG | HEART RATE: 83 BPM | DIASTOLIC BLOOD PRESSURE: 94 MMHG | RESPIRATION RATE: 18 BRPM

## 2023-09-08 VITALS
SYSTOLIC BLOOD PRESSURE: 141 MMHG | RESPIRATION RATE: 18 BRPM | HEART RATE: 89 BPM | TEMPERATURE: 98 F | DIASTOLIC BLOOD PRESSURE: 90 MMHG | OXYGEN SATURATION: 100 %

## 2023-09-08 DIAGNOSIS — Z79.02 LONG TERM (CURRENT) USE OF ANTITHROMBOTICS/ANTIPLATELETS: ICD-10-CM

## 2023-09-08 DIAGNOSIS — Z88.8 ALLERGY STATUS TO OTHER DRUGS, MEDICAMENTS AND BIOLOGICAL SUBSTANCES: ICD-10-CM

## 2023-09-08 DIAGNOSIS — F19.94 OTHER PSYCHOACTIVE SUBSTANCE USE, UNSPECIFIED WITH PSYCHOACTIVE SUBSTANCE-INDUCED MOOD DISORDER: ICD-10-CM

## 2023-09-08 DIAGNOSIS — Z95.5 PRESENCE OF CORONARY ANGIOPLASTY IMPLANT AND GRAFT: ICD-10-CM

## 2023-09-08 DIAGNOSIS — S61.512A LACERATION WITHOUT FOREIGN BODY OF LEFT WRIST, INITIAL ENCOUNTER: ICD-10-CM

## 2023-09-08 DIAGNOSIS — G40.909 EPILEPSY, UNSPECIFIED, NOT INTRACTABLE, WITHOUT STATUS EPILEPTICUS: ICD-10-CM

## 2023-09-08 DIAGNOSIS — F20.9 SCHIZOPHRENIA, UNSPECIFIED: ICD-10-CM

## 2023-09-08 DIAGNOSIS — I10 ESSENTIAL (PRIMARY) HYPERTENSION: ICD-10-CM

## 2023-09-08 DIAGNOSIS — F17.210 NICOTINE DEPENDENCE, CIGARETTES, UNCOMPLICATED: ICD-10-CM

## 2023-09-08 DIAGNOSIS — I25.10 ATHEROSCLEROTIC HEART DISEASE OF NATIVE CORONARY ARTERY WITHOUT ANGINA PECTORIS: ICD-10-CM

## 2023-09-08 DIAGNOSIS — E11.9 TYPE 2 DIABETES MELLITUS WITHOUT COMPLICATIONS: ICD-10-CM

## 2023-09-08 DIAGNOSIS — I48.91 UNSPECIFIED ATRIAL FIBRILLATION: ICD-10-CM
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
## 2023-09-08 DIAGNOSIS — Z79.82 LONG TERM (CURRENT) USE OF ASPIRIN: ICD-10-CM

## 2023-09-08 DIAGNOSIS — Z91.018 ALLERGY TO OTHER FOODS: ICD-10-CM
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
## 2023-09-08 LAB
ANION GAP SERPL CALC-SCNC: 6 MMOL/L — SIGNIFICANT CHANGE UP (ref 5–17)
APAP SERPL-MCNC: <5 UG/ML — LOW (ref 10–30)
BASOPHILS # BLD AUTO: 0.02 K/UL — SIGNIFICANT CHANGE UP (ref 0–0.2)
BASOPHILS NFR BLD AUTO: 0.3 % — SIGNIFICANT CHANGE UP (ref 0–2)
BUN SERPL-MCNC: 17 MG/DL — SIGNIFICANT CHANGE UP (ref 7–23)
CALCIUM SERPL-MCNC: 9.3 MG/DL — SIGNIFICANT CHANGE UP (ref 8.4–10.5)
CHLORIDE SERPL-SCNC: 105 MMOL/L — SIGNIFICANT CHANGE UP (ref 96–108)
CO2 SERPL-SCNC: 30 MMOL/L — SIGNIFICANT CHANGE UP (ref 22–31)
CREAT SERPL-MCNC: 0.94 MG/DL — SIGNIFICANT CHANGE UP (ref 0.5–1.3)
EGFR: 96 ML/MIN/1.73M2 — SIGNIFICANT CHANGE UP
EOSINOPHIL # BLD AUTO: 0.26 K/UL — SIGNIFICANT CHANGE UP (ref 0–0.5)
EOSINOPHIL NFR BLD AUTO: 4.1 % — SIGNIFICANT CHANGE UP (ref 0–6)
ETHANOL SERPL-MCNC: <10 MG/DL — SIGNIFICANT CHANGE UP (ref 0–10)
GLUCOSE SERPL-MCNC: 92 MG/DL — SIGNIFICANT CHANGE UP (ref 70–99)
HCT VFR BLD CALC: 37.5 % — LOW (ref 39–50)
HGB BLD-MCNC: 12 G/DL — LOW (ref 13–17)
IMM GRANULOCYTES NFR BLD AUTO: 0.3 % — SIGNIFICANT CHANGE UP (ref 0–0.9)
LYMPHOCYTES # BLD AUTO: 2 K/UL — SIGNIFICANT CHANGE UP (ref 1–3.3)
LYMPHOCYTES # BLD AUTO: 31.4 % — SIGNIFICANT CHANGE UP (ref 13–44)
MCHC RBC-ENTMCNC: 29.9 PG — SIGNIFICANT CHANGE UP (ref 27–34)
MCHC RBC-ENTMCNC: 32 GM/DL — SIGNIFICANT CHANGE UP (ref 32–36)
MCV RBC AUTO: 93.3 FL — SIGNIFICANT CHANGE UP (ref 80–100)
MONOCYTES # BLD AUTO: 0.38 K/UL — SIGNIFICANT CHANGE UP (ref 0–0.9)
MONOCYTES NFR BLD AUTO: 6 % — SIGNIFICANT CHANGE UP (ref 2–14)
NEUTROPHILS # BLD AUTO: 3.69 K/UL — SIGNIFICANT CHANGE UP (ref 1.8–7.4)
NEUTROPHILS NFR BLD AUTO: 57.9 % — SIGNIFICANT CHANGE UP (ref 43–77)
NRBC # BLD: 0 /100 WBCS — SIGNIFICANT CHANGE UP (ref 0–0)
PLATELET # BLD AUTO: 242 K/UL — SIGNIFICANT CHANGE UP (ref 150–400)
POTASSIUM SERPL-MCNC: 4.1 MMOL/L — SIGNIFICANT CHANGE UP (ref 3.5–5.3)
POTASSIUM SERPL-SCNC: 4.1 MMOL/L — SIGNIFICANT CHANGE UP (ref 3.5–5.3)
RBC # BLD: 4.02 M/UL — LOW (ref 4.2–5.8)
RBC # FLD: 14.6 % — HIGH (ref 10.3–14.5)
SALICYLATES SERPL-MCNC: <0.3 MG/DL — LOW (ref 2.8–20)
SODIUM SERPL-SCNC: 141 MMOL/L — SIGNIFICANT CHANGE UP (ref 135–145)
TROPONIN T, HIGH SENSITIVITY RESULT: <6 NG/L — SIGNIFICANT CHANGE UP (ref 0–51)
WBC # BLD: 6.37 K/UL — SIGNIFICANT CHANGE UP (ref 3.8–10.5)
WBC # FLD AUTO: 6.37 K/UL — SIGNIFICANT CHANGE UP (ref 3.8–10.5)

## 2023-09-08 PROCEDURE — 80048 BASIC METABOLIC PNL TOTAL CA: CPT

## 2023-09-08 PROCEDURE — 93005 ELECTROCARDIOGRAM TRACING: CPT

## 2023-09-08 PROCEDURE — 90792 PSYCH DIAG EVAL W/MED SRVCS: CPT

## 2023-09-08 PROCEDURE — 99285 EMERGENCY DEPT VISIT HI MDM: CPT

## 2023-09-08 PROCEDURE — 99284 EMERGENCY DEPT VISIT MOD MDM: CPT | Mod: 25

## 2023-09-08 PROCEDURE — 80307 DRUG TEST PRSMV CHEM ANLYZR: CPT

## 2023-09-08 PROCEDURE — 82962 GLUCOSE BLOOD TEST: CPT

## 2023-09-08 PROCEDURE — 93010 ELECTROCARDIOGRAM REPORT: CPT

## 2023-09-08 PROCEDURE — 84484 ASSAY OF TROPONIN QUANT: CPT

## 2023-09-08 PROCEDURE — 85025 COMPLETE CBC W/AUTO DIFF WBC: CPT

## 2023-09-08 PROCEDURE — 36415 COLL VENOUS BLD VENIPUNCTURE: CPT

## 2023-09-08 RX ORDER — CLONAZEPAM 1 MG
1 TABLET ORAL ONCE
Refills: 0 | Status: DISCONTINUED | OUTPATIENT
Start: 2023-09-08 | End: 2023-09-08

## 2023-09-08 RX ADMIN — Medication 1 MILLIGRAM(S): at 13:41

## 2023-09-08 NOTE — ED BEHAVIORAL HEALTH ASSESSMENT NOTE - NSBHSAALC_PSY_A_CORE FT
The patient reports that he first consumed alcohol at the age of 13. patient endorse daily drinking of atleast a half pint of vodka since his last inpatient admission 3 weeks ago The patient reports that he first consumed alcohol at the age of 13. patient endorse daily drinking of at least a half pint of vodka since his last inpatient admission 3 weeks ago
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
## 2023-09-08 NOTE — ED PROVIDER NOTE - ATTENDING APP SHARED VISIT CONTRIBUTION OF CARE
52 M schizophrenic minor very superficial scratch/lac to dorsal L wrist.  No bleeding, clean, no fb, no indication for repair.  Cleansed/dressed.  Pt poor historian, long psych hx.  States intentional injury.  No HI.  Pt feels depressed.  Psych consulted and cleared for d/c and outpt services.  TDAP UTD.

## 2023-09-08 NOTE — ED BEHAVIORAL HEALTH ASSESSMENT NOTE - DESCRIPTION
12.0   6.37  )-----------( 242      ( 08 Sep 2023 11:36 )             37.5   09-08    141  |  105  |  17  ----------------------------<  92  4.1   |  30  |  0.94    Ca    9.3      08 Sep 2023 11:36 PMHx of HTN, HLD, DM2, CAD s/p stents The patient was born in [facility] and raised by his mother along with five siblings.  He states that he went to [employer] until the 7th grade when his mother then moved the family to [place] to help their aging maternal grandfather.  He states that he lived in [place] for 11 years.  He states that he dropped out of high school but achieved a GED.  He states that [place] he was 21 years old.  He reports that he is unemployed and receives SSI/SSD.  He was previously domiciled with a friend for approximately one month prior to this hospitalization.  He identifies as Shinto.

## 2023-09-08 NOTE — ED PROVIDER NOTE - CLINICAL SUMMARY MEDICAL DECISION MAKING FREE TEXT BOX
pt with schizophrenia, substance abuse - self-inflicted lac to dorsal aspect left wrist, only partial thickness lac. tetanus up to date.  last CP a couple of days ago, nonischemic ekg, trop neg.  plan: psych eval

## 2023-09-08 NOTE — ED BEHAVIORAL HEALTH ASSESSMENT NOTE - NS ED BHA PLAN TR BH CONTACTED FT
patient requesting inpatient substance rehabilitation, not endorsing active or passive SI at this time and not psychotic

## 2023-09-08 NOTE — ED BEHAVIORAL HEALTH ASSESSMENT NOTE - CURRENT MEDICATION
· 	amLODIPine 10 mg oral tablet: 1 tab(s) orally once a day  · 	aspirin 81 mg oral tablet, chewable: 1 tab(s) orally once a day  · 	atorvastatin 40 mg oral tablet: 1 tab(s) orally once a day (at bedtime)  · 	senna leaf extract oral tablet: 1 tab(s) orally once a day (at bedtime)

## 2023-09-08 NOTE — ED PROVIDER NOTE - PATIENT PORTAL LINK FT
You can access the FollowMyHealth Patient Portal offered by [facility] by registering at the following website: [email or web address]. By joining [email or web address]’s FollowMyHealth portal, you will also be able to view your health information using other applications (apps) compatible with our system.
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
## 2023-09-08 NOTE — ED BEHAVIORAL HEALTH ASSESSMENT NOTE - NSBHMSEAFFQUAL_PSY_A_CORE
Occupational Therapy Discharge Summary    Reason for therapy discharge:    Discharged to home.    Progress towards therapy goal(s). See goals on Care Plan in [facility] electronic health record for goal details.  Goals partially met.  Barriers to achieving goals:   discharge from facility.    Therapy recommendation(s):    No further therapy is recommended.pt has a PCA 5x/week that assists with dressing/bathing and pt has all DME equipment: walker (2), wheelchair, shower chair, commode, grab bars. Pt has family assist also as needed.        Depressed

## 2023-09-08 NOTE — ED PROVIDER NOTE - NSFOLLOWUPINSTRUCTIONS_ED_ALL_ED_FT
Follow up with your methadone program/psychiatry.  You can also follow up with a substance abuse program (see the list provided).  Follow up with primary care physician after weekend.  Return to the Emergency Department if you have any new or worsening symptoms, or if you have any concerns.

## 2023-09-08 NOTE — ED BEHAVIORAL HEALTH ASSESSMENT NOTE - SUMMARY
Pt is a 52 year old male, single w/ no dependents, undomiciled, unemployed on SSD, w/ PMHx of HTN, HLD, DM2, CAD s/p stents, and w/ PPHx per chart of schizoaffective disorder bipolar type, substance-induced mood disorder, Cluster-B Personality Traits, Polysubstance Use Disorder (Tobacco, Alcohol, Cocaine, Stimulant), Opioid Use Disorder on Methadone (currently prescribed Methadone 50mg daily at [facility] per patient), hx of multiple prior inpatient psychiatric hospitalizations (last at [facility] from 8/11/23-8/21/23), hx of patient reported SA, hx of NSSIB, currently, patient-reported history of suicide attempts, no history of NSSIB, no history of violence, currenly enrolled in outpatient tx w/ psychiatrist Dr Rosen, who self presented to the ED w/ c/o CAH and NSSIB via cutting dorsal L wrist. Psychiatry was consulted for a mental health evaluation.    On assessment the patient did not presented as not a reliable historian, but he did not appear to be acutely psychotic, and was alert, oriented, and goal-directed. Patient did endorse poor mood due to an acute social stressor, which led to CAH for NSSIB and HI this morning, but patient is now denying CAH and made many statements that are future oriented, including wanting to find new housing, wanting to find a new job, and visiting his family in [place]. Patient also endorsed increase alcohol consumption over the last few weeks since his last IPP admission, last being this morning prior to his acute social stressor, so that could easily be related to his current mood and presentation vs exacerbation of his previously diagnosed schizoaffective disorder. Given the recent timing of the stressor, adjustment disorder also cannot be ruled out, but it is likely lower on the differential given the substance use. At this time we believe the patient to be an elevated chronic risk for suicide or self harm, but he is not an acute risk, and thus we will not recommend inpatient psychiatric admission. Patient himself describes wanting substance rehabilitation preferably, and given his history he would likely benefit from a program.    #substance induced mood disorder   - patient requesting inpatient substance use rehabilitation, consider connecting him with resources or directly facilitating transfer  - consider Methadone 50mg daily for opioid dependence (patient reported dose), last dose 9/7 per patient  - Plan for follow-up at [facility] ([phone]) for management of Opioid Use Disorder  - consider nicotine 21mg/24h patch transdermal daily for NRT  - monitor for signs of alcohol withdrawal (last drink this AM)    #Schizoaffective Disorder vs Adjustment Disorder with Disturbance of Conduct  - Plan for f/u w/ outpatient psychiatrist Dr Rosen ([facility])  [address] [place] [address], [address]  [place] [address]  [phone]  - consider Aripiprazole 20 mg PO daily (discharged on this medication from [facility] three weeks ago)  - consider benztropine 1 mg PO BID (discharged on this medication from [facility] three weeks ago)  - consider clonazePAM 1 mg PO BID (discharged on this medication from [facility] three weeks ago)  - consider QUEtiapine 100 mg PO QHS (discharged on this medication from [facility] three weeks ago)    #agitation  - For severe agitation not responding to behavioral intervention, consider offering haldol 5 mg po q6h prn, with escalation to IM if patient refusing PO and remains an imminent danger to self or others. If IM antipsychotic is administered, please perform follow-up ECG for QTc monitoring. Hold antipsychotics if Qtc>500 ms. Pt is a 52 year old male, single w/ no dependents, undomiciled, unemployed on SSD, w/ PMHx of HTN, HLD, DM2, CAD s/p stents, and w/ PPHx per chart of schizoaffective disorder bipolar type, substance-induced mood disorder, Cluster-B Personality Traits, Polysubstance Use Disorder (Tobacco, Alcohol, Cocaine, Stimulant), Opioid Use Disorder on Methadone (currently prescribed Methadone 50mg daily at [facility] per patient), hx of multiple prior inpatient psychiatric hospitalizations (last at [facility] from 8/11/23-8/21/23), hx of patient reported SA, hx of NSSIB, currently, patient-reported history of suicide attempts, no history of NSSIB, no history of violence, currenly enrolled in outpatient tx w/ psychiatrist Dr Rosen, who self presented to the ED w/ c/o CAH and NSSIB via cutting dorsal L wrist. Psychiatry was consulted for a mental health evaluation.    On assessment the patient did not presented as not a reliable historian, but he did not appear to be acutely psychotic, and was alert, oriented, and goal-directed. Patient did endorse poor mood due to an acute social stressor, which led to CAH for NSSIB and HI this morning, but patient is now denying CAH and made many statements that are future oriented, including wanting to find new housing, wanting to find a new job, and visiting his family in [place]. Patient also endorsed increase alcohol consumption over the last few weeks since his last IPP admission, last being this morning prior to his acute social stressor, so that could easily be related to his current mood and presentation vs exacerbation of his previously diagnosed schizoaffective disorder. Given the recent timing of the stressor, adjustment disorder also cannot be ruled out, but it is likely lower on the differential given the substance use. At this time we believe the patient to be an elevated chronic risk for suicide or self harm, but he is not an acute risk, and thus we will not recommend inpatient psychiatric admission. Patient himself describes wanting substance rehabilitation preferably, and given his history he would likely benefit from a program.    #substance induced mood disorder   - patient requesting inpatient substance use rehabilitation, consider connecting him with resources or directly facilitating transfer  - consider Methadone 50mg daily for opioid dependence (patient reported dose), last dose 9/7 per patient  - Plan for follow-up at [facility] ([phone]) for management of Opioid Use Disorder  - consider nicotine 21mg/24h patch transdermal daily for NRT  - monitor for signs of alcohol withdrawal (last drink this AM)    #Schizoaffective Disorder vs Adjustment Disorder with Disturbance of Conduct  - Plan for f/u w/ outpatient psychiatrist Dr Rosen ([facility])  [address] [place] [address], [address]  [place] [address] [phone]  - attempted to contact outpatient psychiatrist but unable to connect  - consider Aripiprazole 20 mg PO daily (discharged on this medication from [facility] three weeks ago)  - consider benztropine 1 mg PO BID (discharged on this medication from [facility] three weeks ago)  - consider clonazePAM 1 mg PO BID (discharged on this medication from [facility] three weeks ago)  - consider QUEtiapine 100 mg PO QHS (discharged on this medication from [facility] three weeks ago)    #agitation  - For severe agitation not responding to behavioral intervention, consider offering haldol 5 mg po q6h prn, with escalation to IM if patient refusing PO and remains an imminent danger to self or others. If IM antipsychotic is administered, please perform follow-up ECG for QTc monitoring. Hold antipsychotics if Qtc>500 ms.
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
## 2023-09-08 NOTE — ED PROVIDER NOTE - PHYSICAL EXAMINATION
CONSTITUTIONAL: NAD   SKIN: Normal color and turgor.    HEAD: NC/AT.  EYES: Conjunctiva clear. EOMI. PERRL.    ENT: Airway clear. Normal voice.   RESPIRATORY:  Normal work of breathing. Lungs CTAB.  CARDIOVASCULAR:  RRR, S1S2. No M/R/G.      GI:  Abdomen soft, nontender.    MSK: Neck supple.  No LE edema or calf tenderness. No joint swelling or ROM limitation.  NEURO: Alert; CN: grossly intact. Speech clear.  DAVIES. Gait steady.

## 2023-09-08 NOTE — ED BEHAVIORAL HEALTH ASSESSMENT NOTE - RISK ASSESSMENT
Patient has risk factors for self harm or suicide including: prior diagnosis of mood disorder, prior diagnosis of psychotic disorder, prior SA, prior NSSIB, current symptoms of depression, unclear outpatient treatment status, active substance use, and an acute social stressor that is leading to shame and anger. However patient also has many protective factors, including: residential stability (for now), reports compliance with medications, he is help-seeking, he is future oriented, and he has the insight and judgment to self present when in a mental health crisis.

## 2023-09-08 NOTE — ED BEHAVIORAL HEALTH ASSESSMENT NOTE - OTHER PAST PSYCHIATRIC HISTORY (INCLUDE DETAILS REGARDING ONSET, COURSE OF ILLNESS, INPATIENT/OUTPATIENT TREATMENT)
[place]:  MEDICAID ID: [id number]  [place] FOR SI: SI x11 (First Date 01/14/2012) most recently at [facility] ED (02/19/22), Treatment for suicide attempt & Self-Inflicted Poisoning (04/04/2017) at [facility] ED, history of opioid overdose (01/13/22) at [facility] ED  SOCIAL DETERMINANTS OF HEALTH (SDOH): Disruption Of Family By Separation And Divorce, Unemployment, Unspecified, Food Insecurity Sheltered Homelessness Other Specified Lack Of Adequate Food Homelessness Unspecified  QUALITY FLAGS: High Utilization - Inpt/ER, Polypharmacy (Antipsychotic Two Plus  Psychotropics Four Plus Aripiprazole + Clonazepam + Clonidine Hcl + Doxepin Hcl + Gabapentin + Olanzapine + Quetiapine Fumarate), Medication Assisted Treatment (MAT) for Opioid Use Disorder (OUD) Not Sustained 6 Months  No Utilization of Pharmacotherapy for Alcohol Abuse or Dependence, Low Antipsychotic Medication Adherence - Schizophrenia  BEHAVIORAL HEALTH DIAGNOSES: Opioid related disorders Schizoaffective Disorder Other psychoactive substance related disorders Schizophrenia Borderline Personality Disorder Unspecified/Other Anxiety Disorder Adjustment Disorder Cocaine related disorders Alcohol related disorders Sedative, hypnotic, or anxiolytic related disorders Substance-Induced Depressive Disorder Tobacco related disorder Bipolar I Other stimulant related disorders Panic Disorder Unspecified/Other Bipolar Unspecified/Other Depressive Disorder Unspecified/Other Personality Disorder  BEHAVIORAL HEALTH MEDICATIONS: Clonazepam 1 mg PO TID (09/02/22 – 06/19/23), Zolpidem Tartrate 10 mg PO daily (08/23/18 – 12/04/19), Buprenorphine Hydrochloride Injection 0.1 mg (07/31/18 – 10/17/19), Clonidine HCl 0.3 mg PO TID (01/12/22 – 06/28/23), Gabapentin 100 mg PO TID (07/25/22 – 06/28/23), Doxepin HCL 50 mg PO BID (09/02/22 – 06/20/23), Aripiprazole 15 mg PO daily (10/03/22 – 06/16/23), Quetiapine Fumarate 100 mg PO daily (05/22/23 – 06/16/23), Olanzapine 5 mg PO daily (09/02/22 – 04/14/23), Olanzapine 20 mg PO daily (09/02/22 – 04/14/23), Amitriptyline HCL 50 mg Po daily (01/21/22), Hydroxyzine Pamoate 50 mg PO QID (12/15/21 – 01/14/22), lorazepam 1 mg PO QID (01/14/22), Nicotine Polacrilex  4 mg/27.5 daily (01/14/22)  OUTPATIENT: [facility] (11/01/21 – 07/13/23) for Opioid Dependence Uncomplicated, [facility] (10/21/21 – 06/16/23) for Schizoaffective Disorder, bipolar type  INPATIENT: MH x1 most recently at [facility] (02/21/22 – 02/24/22) for Other Psychoactive Substance Abuse with Psychoactive SIMD  ED: MH x2 most recently at [facility] (06/20/23-06/21/23) for Schizoaffective Disorder Unspecified, and KIM x3 most recently at [facility] (11/04/22) for Opioid Abuse Uncomplicated, [facility] (02/29/22) for Opioid Use Unspecified Uncomplicated, [facility] (01/13/22) for Poisoning by Unspecified Narcotics Accidental

## 2023-09-11 NOTE — DISCHARGE NOTE NURSING/CASE MANAGEMENT/SOCIAL WORK - NSCORESITESY/N_GEN_A_CORE_RD
Name: Charanjit Gordon      : 1940      MRN: [id number]  Encounter Provider: Wilbert Masters MD  Encounter Date: 2023   Encounter department: [address] [place] [address]   Patient patient is an 77-year-old female with PMH of Parkinson's male with PMH of parkinsonism, chronic diastolic CHF chronic diastolic CHF, visual hallucinations Nations, ambulatory dysfunction Nations, ambulatory dysfunction, sciatica, prediabetes, prediabetes, presents today for 3-month follow up. Patient endorses endorses health overall same, does endorse progressive progressively deteriorating mobility as well as stability as well as progressive difficulties with short-term memory for short-term memory. As per aide, patient is beginning to have episodes  of hallucinations as described below. Pt is continuing to struggle with relations with adult children, who frequently ask for money and other favors she cannot afford. Labs and other orders as below, return in three months for re-check     1. Parkinson's disease [facility])  Assessment & Plan:  Patient endorses continued struggles with parkinsonian symptoms, states she has difficulty utilizing her walker for ambulation. Patient continues to endorse deficits in short-term and working memory, and additionally states that she occasionally sees people or objects in the room with her which she attempts to interact with, but do not interact with her. Patient states these visual hallucinations are not impactful for her life and are not causing her suffering or duress    Plan:  • Initiate trial of Namenda  • Continue to stress importance of ambulation using rollator  • Return in 3 months for reassessment    Orders:  -     memantine (Namenda) 10 mg tablet; Take 1 tablet (10 mg total) by mouth 2 (two) times a day    2.  Chronic diastolic CHF (congestive heart failure) ([facility])  Assessment & Plan:  Wt Readings from Last 3 Encounters:   23 67.6 kg (149 lb) 07/18/23 67.6 kg (149 lb)   06/20/23 67.6 kg (149 lb)   Weights stable at last 3 visits, patient endorses no orthopnea, dyspnea, weight gain on home scale, or other similar signs. Plan:  • Continue Lasix 20 mg twice daily  • Continue to monitor weight at follow-up appointments. Orders:  -     Comprehensive metabolic panel; Future    3. Constipation, unspecified constipation type  Assessment & Plan:  Patient continues to endorse issues with constipation, endorsing infrequent, hard bowel movements roughly every 2 to 3 days which require straining. Uncertain of her current diet as aide cooks for her, unlikely to have adequate fiber. Given age and significant deconditioning, likely slow transit constipation    Plan:  • Trial of Senokot at bedtime  • Encourage patient to have adequate fluid intake and adequate exercise to aid in motility  • Return in 3 months for reassessment      Orders:  -     senna (SENOKOT) 8.6 mg; Take 1 tablet (8.6 mg total) by mouth daily at bedtime    4. Ambulatory dysfunction  Assessment & Plan:  Patient endorses dependence on rollator for ambulation, states she has difficulty maneuvering it through the tight spaces of her living situation. Plan:  • Advised patient to continue utilizing rollator  Advised patient caution during transfers, encouraged her to rely on her aide and on rollator for support      5. Prediabetes  Assessment & Plan:  Lab Results   Component Value Date    HGBA1C 5.7 (H) 01/16/2023   Patient endorses no significant changes in lifestyle or diet since last HbA1c as listed above. Plan:  • Fasting CMP ordered        Orders:  -     Lipid Panel with Direct LDL reflex; Future  -     Comprehensive metabolic panel; Future    6. Peripheral edema  Assessment & Plan:  Stable as of this exam with no increase in weight. Continue current medication regimen    Orders:  -     Lipid Panel with Direct LDL reflex; Future    7.  Sciatica of left side  Assessment & Plan:  Patient continues to endorse pain from sciatica, bilateral, left greater than right. Plan:  • Continue to encourage exercise and supervised ambulation  • Continue with Tylenol 650 as needed every 8 hours        8. Visual hallucinations  Assessment & Plan:            Patient endorses visual hallucinations of children in room with her. Patient states hallucinations are frequent, nonthreatening, and exist as shadowy forms at the edges of her vision. Patient states she has attempted to interact with them, but they do not interact back. Patient states she does not feel threatened by them and understands that they are not actually present. Likely consequence of progressive parkinsonism    Plan:  • Limited trial of Namenda        9. Stage 3a chronic kidney disease [facility])  Assessment & Plan:  Lab Results   Component Value Date    EGFR 36 01/16/2023    EGFR 48 09/12/2022    EGFR 52 06/17/2022    CREATININE 1.33 (H) 01/16/2023    CREATININE 1.06 09/12/2022    CREATININE 0.99 06/17/2022   At baseline for this patient. Repeat CMP ordered to assess for progressions    Orders:  -     Comprehensive metabolic panel; Future    BMI Counseling: Body mass index is 31.14 kg/m². The BMI is above normal. Nutrition recommendations include encouraging healthy choices of fruits and vegetables. Exercise recommendations include moderate physical activity 150 minutes/week. Rationale for BMI follow-up plan is due to patient being overweight or obese. Subjective     Congestive Heart Failure  Patient presents for re-evaluation of congestive heart failure. Patient's current complaints are none. She denies chest pain, dyspnea, palpitations and paroxysmal nocturnal dyspnea. She states she is compliant all of the time with her medications. She states she is unsure how complaint she is with her diet. Review of Systems   Constitutional: Positive for fatigue. Negative for appetite change and fever.    HENT: Negative for dental problem and trouble swallowing. Eyes: Negative for visual disturbance. Respiratory: Negative for chest tightness, shortness of breath and wheezing. Cardiovascular: Positive for leg swelling. Negative for chest pain and palpitations. Gastrointestinal: Positive for constipation. Negative for abdominal pain, diarrhea, nausea and vomiting. Genitourinary: Negative for dysuria. Musculoskeletal: Positive for back pain, gait problem and neck pain. Skin: Negative for rash. Neurological: Negative for dizziness. Psychiatric/Behavioral: Positive for confusion and hallucinations. Past Medical History:   Diagnosis Date   • Alzheimer's dementia [facility])    • Aphasia    • CHF (congestive heart failure) (HCC)    • Coronary artery disease    • Hypercholesteremia    • Hypertension    • IBS (irritable bowel syndrome)    • Renal disorder    • Rheumatic fever    • Skin lesion      Past Surgical History:   Procedure Laterality Date   • ANKLE FRACTURE SURGERY      LAST ASSESSED [id number]   • HYSTERECTOMY     • KIDNEY SURGERY     • NEPHRECTOMY     • TONSILLECTOMY       Family History   Problem Relation Age of Onset   • Hypertension Mother    • Asthma Father    • Cancer Sister      Social History     Socioeconomic History   • Marital status:       Spouse name: None   • Number of children: None   • Years of education: None   • Highest education level: None   Occupational History   • None   Tobacco Use   • Smoking status: Never   • Smokeless tobacco: Never   Vaping Use   • Vaping Use: Never used   Substance and Sexual Activity   • Alcohol use: No   • Drug use: No   • Sexual activity: Not Currently   Other Topics Concern   • None   Social History Narrative   • None     Social Determinants of Health     Financial Resource Strain: Not on file   Food Insecurity: No Food Insecurity (6/16/2022)    Hunger Vital Sign    • Worried About Running Out of Food in the Last Year: Never true    • Ran Out of Food in the Last Year: Never true No Transportation Needs: No Transportation Needs (6/16/2022)    PRAPARE - Transportation    • Lack of Transportation (Medical): No    • Lack of Transportation (Non-Medical): No   Physical Activity: Not on file   Stress: Not on file   Social Connections: Not on file   Intimate Partner Violence: Not on file   Housing Stability: Low Risk  (6/16/2022)    Housing Stability Vital Sign    • Unable to Pay for Housing in the Last Year: No    • Number of Places Lived in the Last Year: 2    • Unstable Housing in the Last Year: No     Current Outpatient Medications on File Prior to Visit   Medication Sig   • acetaminophen (TYLENOL) 650 mg CR tablet Take 1 tablet (650 mg total) by mouth every 8 (eight) hours as needed for mild pain or moderate pain   • aspirin (ECOTRIN LOW STRENGTH) 81 mg EC tablet Take 81 mg by mouth daily   • cholecalciferol (VITAMIN D3) 1,000 units tablet take 1 tablet by mouth once daily   • Eliquis 5 MG Take 1 tablet (5 mg total) by mouth 2 (two) times a day   • ezetimibe (ZETIA) 10 mg tablet take 1 tablet by mouth once daily   • furosemide (LASIX) 20 mg tablet take 1 tablet by mouth twice a day   • isosorbide mononitrate (IMDUR) 60 mg 24 hr tablet Take 1 tablet (60 mg total) by mouth daily   • lansoprazole (PREVACID) 30 mg capsule Take 1 capsule (30 mg total) by mouth in the morning.    • losartan (COZAAR) 25 mg tablet take 1 tablet by mouth once daily   • silver sulfadiazine (Silvadene) 1 % cream Apply topically daily   • zinc oxide (DESITIN) 40 % PSTE Apply topically daily   • Syringe/Needle, Disp, (SYRINGE 3CC/22GX1") 22G X 1" 3 ML MISC Use once a week (Patient not taking: Reported on 9/11/2023)     Allergies   Allergen Reactions   • Amoxicillin Hives   • Ciprofloxacin Hives   • Erythromycin Hives   • Statins Other (See Comments)     unknown   • Vancomycin Itching   • Amoxicillin-Pot Clavulanate Other (See Comments)     pt does not remember   • Clindamycin Other (See Comments)     PT DOES NOT RECALL REACTION       Immunization History   Administered Date(s) Administered   • COVID-19 MODERNA VACC 0.5 ML IM 03/16/2021, 04/13/2021, 03/08/2022   • Influenza, high dose seasonal 0.7 mL 10/09/2018, 10/22/2019, 10/25/2021, 10/11/2022   • Pneumococcal Conjugate 13-Valent 10/09/2018   • Pneumococcal Polysaccharide PPV23 11/14/2016       Objective     /68   Pulse 64   Temp 98.4 °F (36.9 °C) (Temporal)   Resp 16   Wt 67.6 kg (149 lb)   SpO2 97%   BMI 31.14 kg/m²     Physical Exam  Constitutional:       Appearance: Normal appearance. She is obese. She is not ill-appearing. HENT:      Head: Normocephalic and atraumatic. Right Ear: External ear normal.      Left Ear: External ear normal.      Nose: Nose normal.      Mouth/Throat:      Mouth: Mucous membranes are moist.      Pharynx: Oropharynx is clear. Eyes:      General: No scleral icterus. Extraocular Movements: Extraocular movements intact. Conjunctiva/sclera: Conjunctivae normal.   Cardiovascular:      Rate and Rhythm: Normal rate and regular rhythm. Pulses: Normal pulses. Heart sounds: Normal heart sounds. No murmur heard. No friction rub. No gallop. Pulmonary:      Effort: Pulmonary effort is normal.      Breath sounds: Normal breath sounds. No wheezing, rhonchi or rales. Abdominal:      Palpations: Abdomen is soft. There is no mass. Tenderness: There is no abdominal tenderness. There is no guarding. Musculoskeletal:      Cervical back: Normal range of motion and neck supple. Right lower leg: Edema present. Left lower leg: Edema present. Comments: Trace edema of B/L LE   Skin:     General: Skin is warm and dry. Capillary Refill: Capillary refill takes less than 2 seconds. Neurological:      Mental Status: She is disoriented.    Psychiatric:         Mood and Affect: Mood normal.       Renny Roland MD

## 2023-09-22 ENCOUNTER — EMERGENCY (EMERGENCY)
Facility: HOSPITAL | Age: 53
LOS: 1 days | Discharge: HOME | End: 2023-09-22
Attending: STUDENT IN AN ORGANIZED HEALTH CARE EDUCATION/TRAINING PROGRAM | Admitting: STUDENT IN AN ORGANIZED HEALTH CARE EDUCATION/TRAINING PROGRAM
Payer: MEDICAID

## 2023-09-22 VITALS
RESPIRATION RATE: 18 BRPM | HEIGHT: 66 IN | DIASTOLIC BLOOD PRESSURE: 80 MMHG | SYSTOLIC BLOOD PRESSURE: 138 MMHG | TEMPERATURE: 98 F | OXYGEN SATURATION: 98 % | HEART RATE: 69 BPM

## 2023-09-22 DIAGNOSIS — F11.10 OPIOID ABUSE, UNCOMPLICATED: ICD-10-CM

## 2023-09-22 DIAGNOSIS — Z79.82 LONG TERM (CURRENT) USE OF ASPIRIN: ICD-10-CM

## 2023-09-22 DIAGNOSIS — Z88.8 ALLERGY STATUS TO OTHER DRUGS, MEDICAMENTS AND BIOLOGICAL SUBSTANCES: ICD-10-CM

## 2023-09-22 DIAGNOSIS — F12.129 CANNABIS ABUSE WITH INTOXICATION, UNSPECIFIED: ICD-10-CM

## 2023-09-22 DIAGNOSIS — F20.9 SCHIZOPHRENIA, UNSPECIFIED: ICD-10-CM

## 2023-09-22 DIAGNOSIS — Z20.822 CONTACT WITH AND (SUSPECTED) EXPOSURE TO COVID-19: ICD-10-CM

## 2023-09-22 DIAGNOSIS — Z91.018 ALLERGY TO OTHER FOODS: ICD-10-CM

## 2023-09-22 LAB
ALBUMIN SERPL ELPH-MCNC: 4.3 G/DL — SIGNIFICANT CHANGE UP (ref 3.3–5)
ALP SERPL-CCNC: 75 U/L — SIGNIFICANT CHANGE UP (ref 40–120)
ALT FLD-CCNC: 18 U/L — SIGNIFICANT CHANGE UP (ref 10–45)
ANION GAP SERPL CALC-SCNC: 8 MMOL/L — SIGNIFICANT CHANGE UP (ref 5–17)
APAP SERPL-MCNC: <5 UG/ML — LOW (ref 10–30)
AST SERPL-CCNC: 20 U/L — SIGNIFICANT CHANGE UP (ref 10–40)
BASOPHILS # BLD AUTO: 0.05 K/UL — SIGNIFICANT CHANGE UP (ref 0–0.2)
BASOPHILS NFR BLD AUTO: 0.8 % — SIGNIFICANT CHANGE UP (ref 0–2)
BILIRUB SERPL-MCNC: 0.3 MG/DL — SIGNIFICANT CHANGE UP (ref 0.2–1.2)
BUN SERPL-MCNC: 10 MG/DL — SIGNIFICANT CHANGE UP (ref 7–23)
CALCIUM SERPL-MCNC: 9.1 MG/DL — SIGNIFICANT CHANGE UP (ref 8.4–10.5)
CHLORIDE SERPL-SCNC: 103 MMOL/L — SIGNIFICANT CHANGE UP (ref 96–108)
CO2 SERPL-SCNC: 29 MMOL/L — SIGNIFICANT CHANGE UP (ref 22–31)
CREAT SERPL-MCNC: 0.86 MG/DL — SIGNIFICANT CHANGE UP (ref 0.5–1.3)
EGFR: 103 ML/MIN/1.73M2 — SIGNIFICANT CHANGE UP
EOSINOPHIL # BLD AUTO: 0.42 K/UL — SIGNIFICANT CHANGE UP (ref 0–0.5)
EOSINOPHIL NFR BLD AUTO: 6.5 % — HIGH (ref 0–6)
ETHANOL SERPL-MCNC: <10 MG/DL — SIGNIFICANT CHANGE UP (ref 0–10)
GLUCOSE SERPL-MCNC: 100 MG/DL — HIGH (ref 70–99)
HCT VFR BLD CALC: 37.1 % — LOW (ref 39–50)
HGB BLD-MCNC: 12.3 G/DL — LOW (ref 13–17)
IMM GRANULOCYTES NFR BLD AUTO: 0.2 % — SIGNIFICANT CHANGE UP (ref 0–0.9)
LYMPHOCYTES # BLD AUTO: 2.09 K/UL — SIGNIFICANT CHANGE UP (ref 1–3.3)
LYMPHOCYTES # BLD AUTO: 32.2 % — SIGNIFICANT CHANGE UP (ref 13–44)
MCHC RBC-ENTMCNC: 30.4 PG — SIGNIFICANT CHANGE UP (ref 27–34)
MCHC RBC-ENTMCNC: 33.2 GM/DL — SIGNIFICANT CHANGE UP (ref 32–36)
MCV RBC AUTO: 91.6 FL — SIGNIFICANT CHANGE UP (ref 80–100)
MONOCYTES # BLD AUTO: 0.5 K/UL — SIGNIFICANT CHANGE UP (ref 0–0.9)
MONOCYTES NFR BLD AUTO: 7.7 % — SIGNIFICANT CHANGE UP (ref 2–14)
NEUTROPHILS # BLD AUTO: 3.43 K/UL — SIGNIFICANT CHANGE UP (ref 1.8–7.4)
NEUTROPHILS NFR BLD AUTO: 52.6 % — SIGNIFICANT CHANGE UP (ref 43–77)
NRBC # BLD: 0 /100 WBCS — SIGNIFICANT CHANGE UP (ref 0–0)
PLATELET # BLD AUTO: 225 K/UL — SIGNIFICANT CHANGE UP (ref 150–400)
POTASSIUM SERPL-MCNC: 3.7 MMOL/L — SIGNIFICANT CHANGE UP (ref 3.5–5.3)
POTASSIUM SERPL-SCNC: 3.7 MMOL/L — SIGNIFICANT CHANGE UP (ref 3.5–5.3)
PROT SERPL-MCNC: 7 G/DL — SIGNIFICANT CHANGE UP (ref 6–8.3)
RBC # BLD: 4.05 M/UL — LOW (ref 4.2–5.8)
RBC # FLD: 14 % — SIGNIFICANT CHANGE UP (ref 10.3–14.5)
SALICYLATES SERPL-MCNC: <0.3 MG/DL — LOW (ref 2.8–20)
SARS-COV-2 RNA SPEC QL NAA+PROBE: SIGNIFICANT CHANGE UP
SODIUM SERPL-SCNC: 140 MMOL/L — SIGNIFICANT CHANGE UP (ref 135–145)
WBC # BLD: 6.5 K/UL — SIGNIFICANT CHANGE UP (ref 3.8–10.5)
WBC # FLD AUTO: 6.5 K/UL — SIGNIFICANT CHANGE UP (ref 3.8–10.5)

## 2023-09-22 PROCEDURE — 99284 EMERGENCY DEPT VISIT MOD MDM: CPT

## 2023-09-22 NOTE — ED ADULT NURSE NOTE - ORIENTED TO PLACE
Name: Moris BENTLEY [facility] Number: [id number]  Date of Treatment: 02/19/2019   Diagnosis:   Encounter Diagnoses   Name Primary?    Decreased range of motion of right shoulder     Shoulder weakness     Right shoulder pain, unspecified chronicity        Time in: 1100  Time Out: 1210  Total Treatment Time: 70        Subjective:    Moris BENTLEY reports he is having more pain this morning than he usually does.  Pt states this is usually the pain he has at night.  Pt reports more sharp pains also into R UE that comes and goes..  Patient reports their pain to be 6-7/10 on a 0-10 scale with 0 being no pain and 10 being the worst pain imaginable.    Objective    Patient received group therapy to increase strength, endurance, ROM, flexibility and posture with 1 patients with activities as follows:     Moris BENTLEY received therapeutic exercises to develop strength, endurance, ROM, flexibility and posture for 50 minutes including:     Ball squeezes x 20 reps  Digiflex x 20 reps  Isometrics flex, ext, IR, ER, abd x 20 reps  Wrist flex/ext x 20 reps each  Pronation/supination x 20 reps each  Elbow flex/ext x 20 reps  pendulums x 20 CW, CCW, flex/ext, side/side x 20 reps   Wand exercises in supine x 20 reps flex, abd ER    CP x 10 min to R shoulder following exercises and manual PT to decrease pain and inflammation              Moris BENTLEY received the following manual therapy techniques: Soft tissue Mobilization/PROM were applied to the: R shoulder/UE for 10 minutes.     Pt demo good understanding of the education provided. Moris BENTLEY demonstrated good return demonstration of activities.     Assessment:       Pt will continue to benefit from skilled PT intervention. Medical Necessity is demonstrated by:  Pain limits function of effected part for some activities, Unable to participate fully in daily activities, Requires skilled supervision to complete and progress HEP and Weakness.    Patient is making good progress towards established  goals.          Plan:  Continue with established Plan of Care towards PT goals.    Yes

## 2023-09-22 NOTE — ED ADULT TRIAGE NOTE - CHIEF COMPLAINT QUOTE
"I have these disturbing thoughts. Sometimes, when someone looks at me, I get angry. I feel like there are these voices telling me to hit myself or another siddhartha."

## 2023-09-22 NOTE — ED ADULT NURSE NOTE - NSFALLUNIVINTERV_ED_ALL_ED
Bed/Stretcher in lowest position, wheels locked, appropriate side rails in place/Call bell, personal items and telephone in reach/Instruct patient to call for assistance before getting out of bed/chair/stretcher/Non-slip footwear applied when patient is off stretcher/[place] to call system/Physically safe environment - no spills, clutter or unnecessary equipment/Purposeful proactive rounding/Room/bathroom lighting operational, light cord in reach
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
## 2023-09-23 VITALS
SYSTOLIC BLOOD PRESSURE: 140 MMHG | RESPIRATION RATE: 16 BRPM | DIASTOLIC BLOOD PRESSURE: 93 MMHG | HEART RATE: 69 BPM | OXYGEN SATURATION: 99 % | TEMPERATURE: 98 F

## 2023-09-23 LAB
AMPHET UR-MCNC: NEGATIVE — SIGNIFICANT CHANGE UP
APPEARANCE UR: CLEAR — SIGNIFICANT CHANGE UP
BACTERIA # UR AUTO: PRESENT /HPF
BARBITURATES UR SCN-MCNC: NEGATIVE — SIGNIFICANT CHANGE UP
BENZODIAZ UR-MCNC: POSITIVE
BILIRUB UR-MCNC: NEGATIVE — SIGNIFICANT CHANGE UP
COCAINE METAB.OTHER UR-MCNC: POSITIVE
COLOR SPEC: YELLOW — SIGNIFICANT CHANGE UP
COMMENT - URINE: SIGNIFICANT CHANGE UP
COVID-19 SPIKE DOMAIN AB INTERP: POSITIVE
COVID-19 SPIKE DOMAIN ANTIBODY RESULT: >250 U/ML — HIGH
DIFF PNL FLD: NEGATIVE — SIGNIFICANT CHANGE UP
EPI CELLS # UR: ABNORMAL /HPF (ref 0–5)
GLUCOSE UR QL: NEGATIVE — SIGNIFICANT CHANGE UP
KETONES UR-MCNC: NEGATIVE — SIGNIFICANT CHANGE UP
LEUKOCYTE ESTERASE UR-ACNC: ABNORMAL
METHADONE UR-MCNC: POSITIVE
NITRITE UR-MCNC: NEGATIVE — SIGNIFICANT CHANGE UP
OPIATES UR-MCNC: NEGATIVE — SIGNIFICANT CHANGE UP
PCP SPEC-MCNC: SIGNIFICANT CHANGE UP
PCP UR-MCNC: NEGATIVE — SIGNIFICANT CHANGE UP
PH UR: 5.5 — SIGNIFICANT CHANGE UP (ref 5–8)
PROT UR-MCNC: NEGATIVE MG/DL — SIGNIFICANT CHANGE UP
RBC CASTS # UR COMP ASSIST: ABNORMAL /HPF
SARS-COV-2 IGG+IGM SERPL QL IA: >250 U/ML — HIGH
SARS-COV-2 IGG+IGM SERPL QL IA: POSITIVE
SP GR SPEC: >=1.03 — SIGNIFICANT CHANGE UP (ref 1–1.03)
THC UR QL: NEGATIVE — SIGNIFICANT CHANGE UP
UROBILINOGEN FLD QL: 1 E.U./DL — SIGNIFICANT CHANGE UP
WBC UR QL: ABNORMAL /HPF

## 2023-09-23 PROCEDURE — 36415 COLL VENOUS BLD VENIPUNCTURE: CPT

## 2023-09-23 PROCEDURE — 86769 SARS-COV-2 COVID-19 ANTIBODY: CPT

## 2023-09-23 PROCEDURE — 99285 EMERGENCY DEPT VISIT HI MDM: CPT

## 2023-09-23 PROCEDURE — 81001 URINALYSIS AUTO W/SCOPE: CPT

## 2023-09-23 PROCEDURE — 82962 GLUCOSE BLOOD TEST: CPT

## 2023-09-23 PROCEDURE — 85025 COMPLETE CBC W/AUTO DIFF WBC: CPT

## 2023-09-23 PROCEDURE — 80307 DRUG TEST PRSMV CHEM ANLYZR: CPT

## 2023-09-23 PROCEDURE — 80053 COMPREHEN METABOLIC PANEL: CPT

## 2023-09-23 PROCEDURE — 87635 SARS-COV-2 COVID-19 AMP PRB: CPT

## 2023-09-23 NOTE — ED PROVIDER NOTE - PHYSICAL EXAMINATION
Constitutional: awake and alert, in no acute distress  HEENT: head normocephalic and atraumatic. moist mucous membranes  Eyes: extraocular movements intact, normal conjunctiva  Neck: supple, normal ROM  Cardiovascular: regular rate   Pulmonary: no respiratory distress  Gastrointestinal: abdomen flat and nondistended  Skin: warm, dry, normal for ethnicity  Musculoskeletal: no edema, no deformity, NROM  Neurological: oriented x4, no focal neurologic deficit.   Psychiatric: calm and cooperative, no SI/HI, admits feeling angry and having thoughts to hurt himself or others

## 2023-09-23 NOTE — ED PROVIDER NOTE - PROGRESS NOTE DETAILS
pt appears more sober now. admits smoking marihuana prior coming to ER, states he had bed thoughts but feels much better now and denies any desire to hurt him or anybody, denies SI/HI, denies auditory or visual hallucinations. pt states he likes to be discharge because he needs to get to methadone clinic. Has out pt f/u with psychiatrist scheduled. known polysubstance abuse, declined any SI/HI or hallucinations, has been calm and cooperative, d/c home for out pt f/u stable.

## 2023-09-23 NOTE — ED PROVIDER NOTE - CLINICAL SUMMARY MEDICAL DECISION MAKING FREE TEXT BOX
51 yo male with h/o schizophrenia, opioid abuse, on methadone in the ER c/o angry thoughts. pt states he doesn't like anyone look at him because he feels like he wants to hit himself or someone. Pt appears intoxicated. Poor historian. denies SI.  Pt with known h/o polysubstance abuse, intoxicated ( high) but calm and cooperative. requesting sometime to sleep and agrees to do labs. will check labs, re-evaluate, call psychiatry for consult.

## 2023-09-23 NOTE — ED PROVIDER NOTE - PATIENT PORTAL LINK FT
You can access the FollowMyHealth Patient Portal offered by [facility] by registering at the following website: [email or web address]. By joining [employer]’s FollowMyHealth portal, you will also be able to view your health information using other applications (apps) compatible with our system.

## 2023-09-23 NOTE — ED PROVIDER NOTE - NSFOLLOWUPINSTRUCTIONS_ED_ALL_ED_FT
Substance Use Disorder  Substance use disorder occurs when a person's repeated use of drugs or alcohol interferes with the ability to be productive. This disorder can cause problems with mental and physical health. It can affect your ability to have healthy relationships, and it can keep you from being able to meet your responsibilities at work, home, or school. It can also lead to addiction, which is a condition in which you cannot stop using the substance consistently for a period of time.    Addiction changes the way the brain works. Because of these changes, addiction is a chronic condition. Substance use disorder can be mild, moderate, or severe.    Some commonly misused substances that can lead to this disorder include:  Alcohol.  Tobacco.  Marijuana.  Stimulants, such as cocaine and methamphetamine.  Hallucinogens, such as LSD and PCP.  Opioids, such as some prescription pain medicines and heroin.  What are the causes?  This condition may develop due to many complex social, psychological, or physical reasons, such as:  Stress.  Abuse.  Peer pressure.  Anxiety or depression.  What increases the risk?  This condition is more likely to develop in people who:  Use substances to cope with stress.  Have been abused.  Have a mental health disorder, such as depression.  Have a family history of substance use disorder.  What are the signs or symptoms?  Symptoms of this condition include:  Using the substance for longer periods of time or at a higher dosage than what is normal or intended.  Having a lasting desire to use the substance.  Being unable to slow down or stop the use of the substance.  Spending an abnormal amount of time getting the substance, using the substance, or recovering from using the substance.  Using the substance in a way that interferes with work, school, social activities, and personal relationships.  Using the substance even after having negative consequences, such as:  Health problems.  Legal or financial troubles.  Job loss.  Relationship problems.  Needing more and more of the substance to get the same effect (developing tolerance).  Experiencing unpleasant symptoms if you do not use the substance (withdrawal).  Using the substance to avoid withdrawal symptoms.  How is this diagnosed?  This condition may be diagnosed based on:  A physical exam.  Your history of substance use.  Your symptoms. This includes:  How substance use affects your life.  Changes in personality, behaviors, and mood.  Having at least two symptoms of substance use disorder within a 12-month period.  Health issues related to substance use, such as liver damage, shortness of breath, fatigue, cough, or heart problems.  Blood or urine tests to screen for alcohol and drugs.  How is this treated?  Three people participating in a support group.  This condition may be treated by:  Stopping substance use safely. This may require taking medicines and being closely monitored for several days.  Taking part in group and individual counseling from mental health providers who help people with substance use disorder.  Staying at a live-in (residential) treatment center for several days or weeks.  Attending daily counseling sessions at a treatment center.  Taking medicine as told by your health care provider:  To ease symptoms and prevent complications during withdrawal.  To treat other mental health issues, such as depression or anxiety.  To block cravings by causing the same effects as the substance.  To block the effects of the substance or replace good sensations with unpleasant ones.  Participating in a support group to share your experience with others who are going through the same thing. These groups are an important part of long-term recovery for many people.  Recovery can be a long process. Many people who undergo treatment start using the substance again after stopping (relapse). If you relapse, that does not mean that treatment will not work.    Follow these instructions at home:  A bottle of beer, a glass of wine, and a glass of hard liquor with a "do not drink" sign over them.   Take over-the-counter and prescription medicines only as told by your health care provider.  Do not use any drugs or alcohol.  Avoid temptations or triggers that you associate with your use of the substance.  Learn and practice techniques for managing stress.  Have a plan for vulnerable moments. Get phone numbers of people who are willing to help and who are committed to your recovery.  Attend support groups on a regular basis. These groups include 12-step programs like Alcoholics Anonymous and Narcotics Anonymous.  Keep all follow-up visits. This is important. This includes continuing to work with therapists and support groups.  Where to find more information  Substance Abuse and Mental Health Services Administration (SAMHSA): www.samhsa.gov  National [place] on Mental Illness (FIL[place]ERTO): www.filiberto.org  Contact a health care provider if:  You cannot take your medicines as told.  Your symptoms get worse.  You have trouble resisting the urge to use drugs or alcohol.  Get help right away if:  You relapse.  You think that you may have taken too much of a drug. The National Poison Control Center hotline is 1-371.395.7009.  You have signs of an overdose. Symptoms include:  Chest pain.  Confusion.  Sleepiness or difficulty staying awake.  Slowed breathing.  Nausea or vomiting.  A seizure.  You have serious thoughts about hurting yourself or someone else.  Drug overdose is an emergency. Do not wait to see if the symptoms will go away. Get medical help right away. Call your local emergency services (503 in the [place] Do not drive yourself to the hospital.    If you ever feel like you may hurt yourself or others, or have thoughts about taking your own life, get help right away. Go to your nearest emergency department or:  Call your local emergency services (939 in the [place]  Call a suicide crisis helpline, such as the National Suicide Prevention Lifeline at 1-163.705.7633 or 205 in the [place] This is open 24 hours a day in the [place]  Text the Crisis Text Line at 527796 (in the [place]  Summary  Substance use disorder occurs when a person's repeated use of drugs or alcohol interferes with the ability to be productive.  Taking part in group and individual counseling from mental health providers is a common treatment for people with substance use disorder.  Recovery can be a long process. Many people who undergo treatment start using the substance again after stopping (relapse). A relapse does not mean that treatment will not work.  Attend support groups such as Alcoholics Anonymous and Narcotics Anonymous. These groups are an important part of long-term recovery for many people.  This information is not intended to replace advice given to you by your health care provider. Make sure you discuss any questions you have with your health care provider.

## 2023-09-23 NOTE — ED PROVIDER NOTE - ATTENDING APP SHARED VISIT CONTRIBUTION OF CARE
53 yo male with h/o schizophrenia, opioid abuse, on methadone in the ER c/o angry thoughts. pt states he doesn't like anyone look at him because he feels like he wants to hit himself or someone. Pt appears intoxicated. Poor historian. denies SI.  Pt with known h/o polysubstance abuse, intoxicated ( high) but calm and cooperative. requesting sometime to sleep and agrees to do labs. will check labs, re-evaluate, call psychiatry for consult.

## 2023-09-23 NOTE — ED PROVIDER NOTE - OBJECTIVE STATEMENT
53 yo male with h/o schizophrenia, opioid abuse, on methadone in the ER c/o angry thoughts. pt states he doesn't like anyone look at him because he feels like he wants to hit himself or someone. Pt appears intoxicated. Poor historian. denies SI.

## 2023-10-01 ENCOUNTER — EMERGENCY (EMERGENCY)
Facility: HOSPITAL | Age: 53
LOS: 1 days | Discharge: HOME | End: 2023-10-01
Attending: EMERGENCY MEDICINE | Admitting: EMERGENCY MEDICINE
Payer: MEDICAID

## 2023-10-01 VITALS
HEART RATE: 83 BPM | SYSTOLIC BLOOD PRESSURE: 143 MMHG | DIASTOLIC BLOOD PRESSURE: 87 MMHG | RESPIRATION RATE: 18 BRPM | WEIGHT: 197.98 LBS | HEIGHT: 66 IN | OXYGEN SATURATION: 96 % | TEMPERATURE: 99 F

## 2023-10-01 DIAGNOSIS — Z95.5 PRESENCE OF CORONARY ANGIOPLASTY IMPLANT AND GRAFT: ICD-10-CM

## 2023-10-01 DIAGNOSIS — E11.9 TYPE 2 DIABETES MELLITUS WITHOUT COMPLICATIONS: ICD-10-CM

## 2023-10-01 DIAGNOSIS — E78.5 HYPERLIPIDEMIA, UNSPECIFIED: ICD-10-CM

## 2023-10-01 DIAGNOSIS — I48.91 UNSPECIFIED ATRIAL FIBRILLATION: ICD-10-CM

## 2023-10-01 DIAGNOSIS — R07.89 OTHER CHEST PAIN: ICD-10-CM

## 2023-10-01 DIAGNOSIS — I10 ESSENTIAL (PRIMARY) HYPERTENSION: ICD-10-CM

## 2023-10-01 DIAGNOSIS — R05.9 COUGH, UNSPECIFIED: ICD-10-CM

## 2023-10-01 DIAGNOSIS — Z20.822 CONTACT WITH AND (SUSPECTED) EXPOSURE TO COVID-19: ICD-10-CM

## 2023-10-01 DIAGNOSIS — Z88.8 ALLERGY STATUS TO OTHER DRUGS, MEDICAMENTS AND BIOLOGICAL SUBSTANCES: ICD-10-CM

## 2023-10-01 DIAGNOSIS — I25.10 ATHEROSCLEROTIC HEART DISEASE OF NATIVE CORONARY ARTERY WITHOUT ANGINA PECTORIS: ICD-10-CM

## 2023-10-01 DIAGNOSIS — Z86.69 PERSONAL HISTORY OF OTHER DISEASES OF THE NERVOUS SYSTEM AND SENSE ORGANS: ICD-10-CM

## 2023-10-01 DIAGNOSIS — Z91.018 ALLERGY TO OTHER FOODS: ICD-10-CM

## 2023-10-01 DIAGNOSIS — J44.9 CHRONIC OBSTRUCTIVE PULMONARY DISEASE, UNSPECIFIED: ICD-10-CM

## 2023-10-01 DIAGNOSIS — Z79.82 LONG TERM (CURRENT) USE OF ASPIRIN: ICD-10-CM

## 2023-10-01 DIAGNOSIS — F20.9 SCHIZOPHRENIA, UNSPECIFIED: ICD-10-CM

## 2023-10-01 PROCEDURE — 99285 EMERGENCY DEPT VISIT HI MDM: CPT

## 2023-10-01 NOTE — ED ADULT TRIAGE NOTE - IDEAL BODY WEIGHT(KG)
1. Have you been to the ER, urgent care clinic since your last visit? Hospitalized since your last visit? Yes, went to [facility] on 11/28/2017 for foot pain/Bone spur. 2. Have you seen or consulted any other health care providers outside of the [facility] since your last visit? Include any pap smears or colon screening.      Chief Complaint   Patient presents with   [facility] Follow Up     went to [facility] on 11/28/2017 for Foot pain and bone spur 62

## 2023-10-01 NOTE — ED ADULT NURSE NOTE - OBJECTIVE STATEMENT
chest pain intermittently for 5 days with cough, recently admitted to [facility] for COPD and ama 6 days ago. Pt complaints of chest pain for 5 days w/ cough, SOB. Recently admitted to [facility] for COPD but AMA 6 days ago.  Denies N/V/D or any other discomfort at this time.  Pt is alert and oriented, A&O4, steady gait noted.

## 2023-10-01 NOTE — ED ADULT TRIAGE NOTE - CHIEF COMPLAINT QUOTE
chest pain intermittently for 5 days with cough, recently admitted to [facility] for COPD and ama 6 days ago.

## 2023-10-01 NOTE — ED ADULT NURSE NOTE - CHIEF COMPLAINT QUOTE
chest pain intermittently for 5 days with cough, recently admitted to [facility] for COPD and ama 6 days ago.

## 2023-10-01 NOTE — ED ADULT NURSE NOTE - NSFALLUNIVINTERV_ED_ALL_ED
Bed/Stretcher in lowest position, wheels locked, appropriate side rails in place/Call bell, personal items and telephone in reach/Instruct patient to call for assistance before getting out of bed/chair/stretcher/Non-slip footwear applied when patient is off stretcher/[place] to call system/Physically safe environment - no spills, clutter or unnecessary equipment/Purposeful proactive rounding/Room/bathroom lighting operational, light cord in reach

## 2023-10-02 VITALS
HEART RATE: 60 BPM | SYSTOLIC BLOOD PRESSURE: 137 MMHG | TEMPERATURE: 98 F | RESPIRATION RATE: 18 BRPM | OXYGEN SATURATION: 97 % | DIASTOLIC BLOOD PRESSURE: 84 MMHG

## 2023-10-02 LAB
ALBUMIN SERPL ELPH-MCNC: 4.4 G/DL — SIGNIFICANT CHANGE UP (ref 3.3–5)
ALP SERPL-CCNC: 75 U/L — SIGNIFICANT CHANGE UP (ref 40–120)
ALT FLD-CCNC: 21 U/L — SIGNIFICANT CHANGE UP (ref 10–45)
ANION GAP SERPL CALC-SCNC: 12 MMOL/L — SIGNIFICANT CHANGE UP (ref 5–17)
AST SERPL-CCNC: 22 U/L — SIGNIFICANT CHANGE UP (ref 10–40)
BASOPHILS # BLD AUTO: 0.06 K/UL — SIGNIFICANT CHANGE UP (ref 0–0.2)
BASOPHILS NFR BLD AUTO: 0.8 % — SIGNIFICANT CHANGE UP (ref 0–2)
BILIRUB SERPL-MCNC: 0.4 MG/DL — SIGNIFICANT CHANGE UP (ref 0.2–1.2)
BUN SERPL-MCNC: 10 MG/DL — SIGNIFICANT CHANGE UP (ref 7–23)
CALCIUM SERPL-MCNC: 9.6 MG/DL — SIGNIFICANT CHANGE UP (ref 8.4–10.5)
CHLORIDE SERPL-SCNC: 102 MMOL/L — SIGNIFICANT CHANGE UP (ref 96–108)
CO2 SERPL-SCNC: 27 MMOL/L — SIGNIFICANT CHANGE UP (ref 22–31)
CREAT SERPL-MCNC: 0.91 MG/DL — SIGNIFICANT CHANGE UP (ref 0.5–1.3)
EGFR: 100 ML/MIN/1.73M2 — SIGNIFICANT CHANGE UP
EOSINOPHIL # BLD AUTO: 0.6 K/UL — HIGH (ref 0–0.5)
EOSINOPHIL NFR BLD AUTO: 7.6 % — HIGH (ref 0–6)
GLUCOSE SERPL-MCNC: 95 MG/DL — SIGNIFICANT CHANGE UP (ref 70–99)
HCT VFR BLD CALC: 36.8 % — LOW (ref 39–50)
HGB BLD-MCNC: 12.1 G/DL — LOW (ref 13–17)
IMM GRANULOCYTES NFR BLD AUTO: 0.4 % — SIGNIFICANT CHANGE UP (ref 0–0.9)
LYMPHOCYTES # BLD AUTO: 1.72 K/UL — SIGNIFICANT CHANGE UP (ref 1–3.3)
LYMPHOCYTES # BLD AUTO: 21.8 % — SIGNIFICANT CHANGE UP (ref 13–44)
MCHC RBC-ENTMCNC: 30.2 PG — SIGNIFICANT CHANGE UP (ref 27–34)
MCHC RBC-ENTMCNC: 32.9 GM/DL — SIGNIFICANT CHANGE UP (ref 32–36)
MCV RBC AUTO: 91.8 FL — SIGNIFICANT CHANGE UP (ref 80–100)
MONOCYTES # BLD AUTO: 0.55 K/UL — SIGNIFICANT CHANGE UP (ref 0–0.9)
MONOCYTES NFR BLD AUTO: 7 % — SIGNIFICANT CHANGE UP (ref 2–14)
NEUTROPHILS # BLD AUTO: 4.93 K/UL — SIGNIFICANT CHANGE UP (ref 1.8–7.4)
NEUTROPHILS NFR BLD AUTO: 62.4 % — SIGNIFICANT CHANGE UP (ref 43–77)
NRBC # BLD: 0 /100 WBCS — SIGNIFICANT CHANGE UP (ref 0–0)
PLATELET # BLD AUTO: 222 K/UL — SIGNIFICANT CHANGE UP (ref 150–400)
POTASSIUM SERPL-MCNC: 4.1 MMOL/L — SIGNIFICANT CHANGE UP (ref 3.5–5.3)
POTASSIUM SERPL-SCNC: 4.1 MMOL/L — SIGNIFICANT CHANGE UP (ref 3.5–5.3)
PROT SERPL-MCNC: 7.1 G/DL — SIGNIFICANT CHANGE UP (ref 6–8.3)
RAPID RVP RESULT: SIGNIFICANT CHANGE UP
RBC # BLD: 4.01 M/UL — LOW (ref 4.2–5.8)
RBC # FLD: 14.4 % — SIGNIFICANT CHANGE UP (ref 10.3–14.5)
SARS-COV-2 RNA SPEC QL NAA+PROBE: SIGNIFICANT CHANGE UP
SODIUM SERPL-SCNC: 141 MMOL/L — SIGNIFICANT CHANGE UP (ref 135–145)
TROPONIN T, HIGH SENSITIVITY RESULT: 7 NG/L — SIGNIFICANT CHANGE UP (ref 0–51)
TROPONIN T, HIGH SENSITIVITY RESULT: 7 NG/L — SIGNIFICANT CHANGE UP (ref 0–51)
WBC # BLD: 7.89 K/UL — SIGNIFICANT CHANGE UP (ref 3.8–10.5)
WBC # FLD AUTO: 7.89 K/UL — SIGNIFICANT CHANGE UP (ref 3.8–10.5)

## 2023-10-02 PROCEDURE — 71046 X-RAY EXAM CHEST 2 VIEWS: CPT

## 2023-10-02 PROCEDURE — 84484 ASSAY OF TROPONIN QUANT: CPT

## 2023-10-02 PROCEDURE — 36415 COLL VENOUS BLD VENIPUNCTURE: CPT

## 2023-10-02 PROCEDURE — 80053 COMPREHEN METABOLIC PANEL: CPT

## 2023-10-02 PROCEDURE — 94640 AIRWAY INHALATION TREATMENT: CPT

## 2023-10-02 PROCEDURE — 85025 COMPLETE CBC W/AUTO DIFF WBC: CPT

## 2023-10-02 PROCEDURE — 0225U NFCT DS DNA&RNA 21 SARSCOV2: CPT

## 2023-10-02 PROCEDURE — 96374 THER/PROPH/DIAG INJ IV PUSH: CPT

## 2023-10-02 PROCEDURE — 99284 EMERGENCY DEPT VISIT MOD MDM: CPT | Mod: 25

## 2023-10-02 PROCEDURE — 71046 X-RAY EXAM CHEST 2 VIEWS: CPT | Mod: 26

## 2023-10-02 RX ORDER — IPRATROPIUM/ALBUTEROL SULFATE 18-103MCG
3 AEROSOL WITH ADAPTER (GRAM) INHALATION ONCE
Refills: 0 | Status: COMPLETED | OUTPATIENT
Start: 2023-10-02 | End: 2023-10-02

## 2023-10-02 RX ORDER — FAMOTIDINE 10 MG/ML
20 INJECTION INTRAVENOUS ONCE
Refills: 0 | Status: COMPLETED | OUTPATIENT
Start: 2023-10-02 | End: 2023-10-02

## 2023-10-02 RX ADMIN — Medication 3 MILLILITER(S): at 02:13

## 2023-10-02 RX ADMIN — FAMOTIDINE 20 MILLIGRAM(S): 10 INJECTION INTRAVENOUS at 01:12

## 2023-10-02 NOTE — ED PROVIDER NOTE - CLINICAL SUMMARY MEDICAL DECISION MAKING FREE TEXT BOX
POOR HISTORIAN 2/2 to Schizophrenia w/PMHx of Schizophrenia, hx of opioid abuse,  CAD s/p stents (does not recall when), DM2, HTN, seizure disorder, afib on ASA, recent admisison to [facility] for copd exacerbation currently on steroids. here w ongoing cough and intermittent chest pain w coughing.   pt well appearing, stable vitals - no tachy or hypoxic, faint wheezing on exam, otherwise unremarkable no rales / rhonchi, no le edema.   suspect sx ongoing from copd exacerbation  possible costochondritis, gerd / gastritis / pud given lower chest and burning discomfort.  r/o pna. do not suspect acs. do not suspect pe / dissection based on presentation  plan - labs, ecg, cxr   trial gi meds and duonebs  reassess

## 2023-10-02 NOTE — ED PROVIDER NOTE - OBJECTIVE STATEMENT
52 yr old male. 52 yr old male, POOR HISTORIAN 2/2 to Schizophrenia w/PMHx of Schizophrenia, hx of opioid abuse on [facility] ([facility])  CAD s/p stents (does not recall when), DM2, HTN, seizure disorder (pt. reports no longer taking Keppra, not listed from prior hx of medication, afib on ASA, presents to the Emergency Department w cough / chest pain. pt recently admitted to [facility] for copd exacerbation. signed out AMA (pt refuses to answer why), left with steroid script, has been taking. here w ongoing cough and intermittent chest discomfort w coughing, similar to symptoms he was admitted for. has used inhalers at home w some relief. no cp / sob w exertion.   no fever, abd pain, n/v/d, leg swelling.

## 2023-10-02 NOTE — ED PROVIDER NOTE - ATTENDING APP SHARED VISIT CONTRIBUTION OF CARE
Attending Statement: I have personally performed a diagnostic evaluation on this patient. I have reviewed the ACP note and agree with the history, exam and plan of care, except as noted.     Attending Contribution to Care:  52M with a hx of substance abuse and COPD who p/w cough and SOB, VSS, no acute resp distress, agree with PA exam, BS tight so treated with nebs and labs, EKG checked. workup with no emergent findings, Do not suspect ACS, PE, dissection or other acute life-threatening pathology at this time. Pt now improved and stable for DC with outpt follow up and return precautions.

## 2023-10-02 NOTE — ED PROVIDER NOTE - PROGRESS NOTE DETAILS
ecg nonischemic. cxr clear. labs reassuring w HS trop 7 -> 7, delta 0.   symptoms improved after gi meds and nebulizer tx. lungs clear w/o wheezing and vitals stable. recommend continue steroid as previously prescribed.   pt stable for dc.     All results reviewed with the patient verbally. Discharge plan and return precautions d/w pt who verbalized understanding and agrees with plan. All questions answered. Vitals WNL. Ready for d/c.

## 2023-10-02 NOTE — ED PROVIDER NOTE - PATIENT PORTAL LINK FT
You can access the FollowMyHealth Patient Portal offered by [facility] by registering at the following website: [email or web address]. By joining [employer]’s FollowMyHealth portal, you will also be able to view your health information using other applications (apps) compatible with our system.

## 2023-10-02 NOTE — ED PROVIDER NOTE - CARE PROVIDER_API CALL
Subhash Rivera  Cardiology  [address] [place] [address], [address]  [place] [address]  Phone: [phone]  Fax: [phone]  Follow Up Time:

## 2023-10-02 NOTE — ED PROVIDER NOTE - NSFOLLOWUPINSTRUCTIONS_ED_ALL_ED_FT
Continue steroids as prescribed at last hospital visit.     Drink plenty of fluids, get plenty of rest.   Avoid strenuous activity until you are seen in follow up.   Take tylenol / motrin as needed for pain.     FOLLOW UP - You need to follow up with a Primary Care Doctor / cardiology within 1-2 weeks for continued evaluation.   Call your Primary Care Doctor this week to schedule a follow up appointment.   See information below regarding a Cardiologist - call to make an appointment.    RETURN PRECAUTIONS - Return to the Emergency Department for persistent, worsening or new symptoms including severe chest pain, shortness of breath, difficulty breathing, nausea/vomiting, excessive sweating, or any other serious concerns.

## 2023-10-02 NOTE — ED PROVIDER NOTE - PHYSICAL EXAMINATION
Constitutional : Well appearing, non-toxic, no acute distress. awake, alert, oriented to person, place, time/situation.  Head : head normocephalic, atraumatic  EENMT : eyes clear bilaterally, PERRL, EOMI. airway patent. moist mucous membranes. neck supple.  Cardiac : Normal rate, regular rhythm. No murmur appreciated, no LE edema.  Resp : Faint expiratory wheezing diffusely. no rales / rhonchi.  Respirations even and unlabored.   Gastro : abdomen soft, nontender, nondistended. no rebound or guarding. no CVAT.  MSK :  range of motion is not limited, no muscle or joint tenderness  Vasc : Extremities warm and well perfused. 2+ radial and DP pulses. cap refill <2 seconds  Neuro : Alert and oriented, CNII-XII grossly intact, no focal deficits, no motor or sensory deficits.  Skin : Skin normal color for race, warm, dry and intact. No evidence of rash.  Psych : Alert and oriented to person, place, time/situation. normal mood and affect. no apparent risk to self or others.

## 2023-10-06 ENCOUNTER — EMERGENCY (EMERGENCY)
Facility: HOSPITAL | Age: 53
LOS: 1 days | Discharge: LEFT BEFORE DISCHARGE | End: 2023-10-06
Admitting: EMERGENCY MEDICINE
Payer: MEDICAID

## 2023-10-06 VITALS
OXYGEN SATURATION: 99 % | SYSTOLIC BLOOD PRESSURE: 150 MMHG | RESPIRATION RATE: 18 BRPM | TEMPERATURE: 98 F | WEIGHT: 177.91 LBS | DIASTOLIC BLOOD PRESSURE: 86 MMHG | HEIGHT: 66 IN | HEART RATE: 71 BPM

## 2023-10-06 DIAGNOSIS — R07.9 CHEST PAIN, UNSPECIFIED: ICD-10-CM

## 2023-10-06 DIAGNOSIS — M79.89 OTHER SPECIFIED SOFT TISSUE DISORDERS: ICD-10-CM

## 2023-10-06 DIAGNOSIS — Z53.21 PROCEDURE AND TREATMENT NOT CARRIED OUT DUE TO PATIENT LEAVING PRIOR TO BEING SEEN BY HEALTH CARE PROVIDER: ICD-10-CM

## 2023-10-06 DIAGNOSIS — R05.9 COUGH, UNSPECIFIED: ICD-10-CM

## 2023-10-06 PROCEDURE — L9991: CPT

## 2023-10-06 NOTE — ED ADULT TRIAGE NOTE - TEMPERATURE IN FAHRENHEIT (DEGREES F)
Hb 6.5 on 4/4/2020 s/p transfusion x 1 prbc  Stable with appropriate response Hb.   No active bleeding  Type/cross  Iron studies consistent with Iron deficiency and ACD       98.5

## 2023-10-08 ENCOUNTER — EMERGENCY (EMERGENCY)
Facility: HOSPITAL | Age: 53
LOS: 1 days | Discharge: HOME | End: 2023-10-08
Admitting: EMERGENCY MEDICINE
Payer: MEDICAID

## 2023-10-08 VITALS
DIASTOLIC BLOOD PRESSURE: 95 MMHG | OXYGEN SATURATION: 100 % | RESPIRATION RATE: 16 BRPM | WEIGHT: 177.91 LBS | HEART RATE: 71 BPM | TEMPERATURE: 98 F | SYSTOLIC BLOOD PRESSURE: 151 MMHG | HEIGHT: 66 IN

## 2023-10-08 PROCEDURE — 83036 HEMOGLOBIN GLYCOSYLATED A1C: CPT

## 2023-10-08 PROCEDURE — 80061 LIPID PANEL: CPT

## 2023-10-08 PROCEDURE — 99285 EMERGENCY DEPT VISIT HI MDM: CPT

## 2023-10-08 PROCEDURE — 85027 COMPLETE CBC AUTOMATED: CPT

## 2023-10-08 PROCEDURE — 80048 BASIC METABOLIC PNL TOTAL CA: CPT

## 2023-10-08 PROCEDURE — 83880 ASSAY OF NATRIURETIC PEPTIDE: CPT

## 2023-10-08 PROCEDURE — 82962 GLUCOSE BLOOD TEST: CPT

## 2023-10-08 PROCEDURE — 87635 SARS-COV-2 COVID-19 AMP PRB: CPT

## 2023-10-08 PROCEDURE — 82248 BILIRUBIN DIRECT: CPT

## 2023-10-08 PROCEDURE — 99283 EMERGENCY DEPT VISIT LOW MDM: CPT

## 2023-10-08 PROCEDURE — 85730 THROMBOPLASTIN TIME PARTIAL: CPT

## 2023-10-08 PROCEDURE — 71045 X-RAY EXAM CHEST 1 VIEW: CPT

## 2023-10-08 PROCEDURE — 93005 ELECTROCARDIOGRAM TRACING: CPT

## 2023-10-08 PROCEDURE — 80177 DRUG SCRN QUAN LEVETIRACETAM: CPT

## 2023-10-08 PROCEDURE — 93306 TTE W/DOPPLER COMPLETE: CPT

## 2023-10-08 PROCEDURE — 84484 ASSAY OF TROPONIN QUANT: CPT

## 2023-10-08 PROCEDURE — 99284 EMERGENCY DEPT VISIT MOD MDM: CPT

## 2023-10-08 PROCEDURE — 82553 CREATINE MB FRACTION: CPT

## 2023-10-08 PROCEDURE — 83735 ASSAY OF MAGNESIUM: CPT

## 2023-10-08 PROCEDURE — 84443 ASSAY THYROID STIM HORMONE: CPT

## 2023-10-08 PROCEDURE — 36415 COLL VENOUS BLD VENIPUNCTURE: CPT

## 2023-10-08 PROCEDURE — 80053 COMPREHEN METABOLIC PANEL: CPT

## 2023-10-08 PROCEDURE — 82550 ASSAY OF CK (CPK): CPT

## 2023-10-08 PROCEDURE — 85025 COMPLETE CBC W/AUTO DIFF WBC: CPT

## 2023-10-08 PROCEDURE — 83690 ASSAY OF LIPASE: CPT

## 2023-10-08 PROCEDURE — 85610 PROTHROMBIN TIME: CPT

## 2023-10-08 RX ORDER — LIDOCAINE 4 G/100G
15 CREAM TOPICAL ONCE
Refills: 0 | Status: COMPLETED | OUTPATIENT
Start: 2023-10-08 | End: 2023-10-08

## 2023-10-08 RX ADMIN — Medication 30 MILLILITER(S): at 13:02

## 2023-10-08 RX ADMIN — LIDOCAINE 15 MILLILITER(S): 4 CREAM TOPICAL at 13:03

## 2023-10-08 NOTE — ED PROVIDER NOTE - CLINICAL SUMMARY MEDICAL DECISION MAKING FREE TEXT BOX
53 y/o current smoker (1ppd X25 yrs ) POOR HISTORIAN 2/2 to Schizophrenia w/PMHx of Schizophrenia, hx of opioid abuse on [facility]adone [facility] ([facility])  CAD s/p stents (does not recall when), DM2, HTN, seizure disorder (pt. reports no longer taking Keppra, not listed from prior hx of medication, afib on ASA presents to ED c/o mild sore throat and abd discomfort x few mins after eating a piece of candy just prior to arrival to ED, which he thinks could have had a piece of glass in it, as it felt sharp.  Abd pain described as "swirling sensation."  Only ate a small piece of candy and spit out the rest.  Denies f/c, cp, sob, n/v/d.  Denies SI, HI, AH, VH, or feeling paranoid currently.  Exam grossly unrevealing. Pt speaking in full clear sentences, tolerating all secretions.  No signs of oropharyngeal trauma or abnormality.  Soft ntnd abdomen.    Overall suspicion for retained foreign body to esophagus or airway is very low at this time. Will trial viscous lido/ maalox for tx, po trial  Provide reassurance

## 2023-10-08 NOTE — ED PROVIDER NOTE - NS ED ROS FT
CONSTITUTIONAL: Denies fever and chills    HEENT: See HPI    RESPIRATORY: Denies SOB    CARDIOVASCULAR: Denies chest pain.    GASTROINTESTINAL: + abd discomfort.  Denies nausea, vomiting and diarrhea.

## 2023-10-08 NOTE — ED ADULT NURSE NOTE - BREATHING, MLM
ER MD at bedside , telling plan of care to patient and family , patient verbalized understanding , ortho surgery calling back      Spontaneous, unlabored and symmetrical

## 2023-10-08 NOTE — ED PROVIDER NOTE - OBJECTIVE STATEMENT
51 y/o current smoker (1ppd X25 yrs ) POOR HISTORIAN 2/2 to Schizophrenia w/PMHx of Schizophrenia, hx of opioid abuse on Methadone [facility] daily ([facility])  CAD s/p stents (does not recall when), DM2, HTN, seizure disorder (pt. reports no longer taking Keppra, not listed from prior hx of medication, afib on ASA presents to ED c/o mild sore throat and abd discomfort x few mins after eating a piece of candy just prior to arrival to ED, which he thinks could have had a piece of glass in it, as it felt sharp.  Abd pain described as "swirling sensation."  Only ate a small piece of candy and spit out the rest.  Denies f/c, cp, sob, n/v/d.  Denies SI, HI, AH, VH, or feeling paranoid currently.

## 2023-10-08 NOTE — ED ADULT TRIAGE NOTE - CHIEF COMPLAINT QUOTE
"I had some candy that had glass in it and I ate it accidentally". +abdominal pain. denies vomiting, chest pain, shortness of breath. speaking in clear full sentences, able to swallow without difficulty.

## 2023-10-08 NOTE — ED PROVIDER NOTE - NSFOLLOWUPINSTRUCTIONS_ED_ALL_ED_FT
Thank you for visiting [facility] Emergency Department.      You may try maalox as needed for pain.    If symptoms persist, please follow up with a gastroenterologist.  Please know that no emergency visit is complete without follow-up with your primary care provider in 1 week.  Please bring copies of all discharge papers and results and show to your doctor.      Please continue taking all previous medications as instructed unless we discussed otherwise.     I appreciated your patience and hope you feel better soon.     Return to ER immediately if you develop fevers, chills, chest pain, shortness of breath, worsening and/or any concerning symptoms.

## 2023-10-08 NOTE — ED PROVIDER NOTE - PATIENT PORTAL LINK FT
You can access the FollowMyHealth Patient Portal offered by [facility] by registering at the following website: [email or web address]. By joining [facility]’s FollowMyHealth portal, you will also be able to view your health information using other applications (apps) compatible with our system.

## 2023-10-08 NOTE — ED ADULT NURSE NOTE - CAS DISCH CONDITION
Pt A&Ox4, respirations even and unlabored, skin color WDL warm and dry, pt is ambulatory with a steady gait. No acute distress observed./Improved

## 2023-10-08 NOTE — ED ADULT NURSE NOTE - OBJECTIVE STATEMENT
51 y/o , hx schizophrenia, 60mg Methadone daily, stents, CAD. C/o sore throat (3/10 pain) and abdominal discomfort post eating a piece of candy beofre coming to ED. Pt reports that he thinks the candy had a piece of glass in, pt stated " It felt sharp when I swallowed". Pt denies chest pain, SOB, N/V/D, fever, chills, visual/auditory hallucination, SI, HI. Pt able to tolerate PO. PT A&Ox4, respirations even and unlabored, skin color WDL warm and dry, pt is ambulatory with a steady gait. No acute distress observed.

## 2023-10-08 NOTE — ED PROVIDER NOTE - PHYSICAL EXAMINATION
CONSTITUTIONAL: Awake, alert.  Nontoxic, no acute distress.    HEAD: Normocephalic, atraumatic.    EYES: Conjunctivae clear without exudates or hemorrhage. Sclera is non-icteric.    ENT:  Ears: External ear normal appearing without tenderness  Nose: Normal appearing nose, nasal mucosa is pink and moist. The nasal septum is midline,  Nares are patent bilaterally.  Throat: Oral mucosa is pink and moist. Tongue normal in appearance without lesions and with good symmetrical movement. No buccal nodules or lesions are noted. The pharynx is normal in appearance without tonsillar swelling or exudates.  Uvula midline.  No trismus.  No submandibular/ submental lymphadenopathy.  Speaking in full clear sentences, tolerating all secretions.    NECK: supple, trachea midline.  FROM.  No ttp.    HEART:  Normal rate, regular rhythm.  Heart sounds S1, S2.  No murmurs, rubs or gallops.    LUNGS:  No acute respiratory distress.  Non-tachypneic and non-labored.  Lungs are clear bilaterally with good aeration.  No wheezing, rales, rhonchi.    ABDOMEN: Normal appearing skin without lesions, rashes.  Normal bowel sounds x 4.  Soft, non-distended, non-tender in all four quadrants. No rebound or guarding. No hernias or masses palpable.  No pulsatile abdominal mass.   No CVA tenderness b/l.    MUSCULOSKELETAL:  Moving all extremities without issue.    SKIN: Skin in warm, dry and intact without rashes or lesions.  Appropriate color for ethnicity.    NEUROLOGICAL:  Patient is alert, oriented x person, place and time.    PSYCH:  Denies SI, HI, AH, VH

## 2023-10-11 DIAGNOSIS — J02.9 ACUTE PHARYNGITIS, UNSPECIFIED: ICD-10-CM

## 2023-10-11 DIAGNOSIS — F20.9 SCHIZOPHRENIA, UNSPECIFIED: ICD-10-CM

## 2023-10-11 DIAGNOSIS — I25.10 ATHEROSCLEROTIC HEART DISEASE OF NATIVE CORONARY ARTERY WITHOUT ANGINA PECTORIS: ICD-10-CM

## 2023-10-11 DIAGNOSIS — E78.5 HYPERLIPIDEMIA, UNSPECIFIED: ICD-10-CM

## 2023-10-11 DIAGNOSIS — I48.91 UNSPECIFIED ATRIAL FIBRILLATION: ICD-10-CM

## 2023-10-11 DIAGNOSIS — Z79.82 LONG TERM (CURRENT) USE OF ASPIRIN: ICD-10-CM

## 2023-10-11 DIAGNOSIS — Z95.5 PRESENCE OF CORONARY ANGIOPLASTY IMPLANT AND GRAFT: ICD-10-CM

## 2023-10-11 DIAGNOSIS — R10.9 UNSPECIFIED ABDOMINAL PAIN: ICD-10-CM

## 2023-10-11 DIAGNOSIS — Z91.018 ALLERGY TO OTHER FOODS: ICD-10-CM

## 2023-10-11 DIAGNOSIS — G40.909 EPILEPSY, UNSPECIFIED, NOT INTRACTABLE, WITHOUT STATUS EPILEPTICUS: ICD-10-CM

## 2023-10-11 DIAGNOSIS — E11.9 TYPE 2 DIABETES MELLITUS WITHOUT COMPLICATIONS: ICD-10-CM

## 2023-10-11 DIAGNOSIS — F17.200 NICOTINE DEPENDENCE, UNSPECIFIED, UNCOMPLICATED: ICD-10-CM

## 2023-10-11 DIAGNOSIS — Z88.8 ALLERGY STATUS TO OTHER DRUGS, MEDICAMENTS AND BIOLOGICAL SUBSTANCES: ICD-10-CM

## 2023-10-11 DIAGNOSIS — I10 ESSENTIAL (PRIMARY) HYPERTENSION: ICD-10-CM

## 2023-10-26 NOTE — BH INPATIENT PSYCHIATRY PROGRESS NOTE - NSBHATTESTAPPBILLTIME_PSY_A_CORE
Discharge Instructions       PATIENT ID: Crow Barney  MRN: [id number]   YOB: 1963    DATE OF ADMISSION: 10/23/2023  DATE OF DISCHARGE: 10/26/2023    PRIMARY CARE PROVIDER: [unfilled]     ATTENDING PHYSICIAN: Luz Gaviria MD   DISCHARGING PROVIDER: Luz Gaviria MD    To contact this individual call [phone] and ask the  to page. If unavailable, ask to be transferred the Adult Hospitalist Department. DISCHARGE DIAGNOSES UTI        CONSULTATIONS: [unfilled]      PROCEDURES/SURGERIES: * No surgery found *    PENDING TEST RESULTS:   At the time of discharge the following test results are still pending: None    FOLLOW UP APPOINTMENTS:   Pardeep Caputo MD  [address] [place] [address]    Schedule an appointment as soon as possible for a visit in 1 week(s)         ADDITIONAL CARE RECOMMENDATIONS: Follow-up with the PCP and continue dialysis 3 times a week    DIET: Renal diet      ACTIVITY: Activity as tolerated    Wound care: {Discharge Wound Care Instructions:32864}    EQUIPMENT needed: ***      DISCHARGE MEDICATIONS:   See Medication Reconciliation Form    It is important that you take the medication exactly as they are prescribed. Keep your medication in the bottles provided by the pharmacist and keep a list of the medication names, dosages, and times to be taken in your wallet. Do not take other medications without consulting your doctor. NOTIFY YOUR PHYSICIAN FOR ANY OF THE FOLLOWING:   Fever over 101 degrees for 24 hours. Chest pain, shortness of breath, fever, chills, nausea, vomiting, diarrhea, change in mentation, falling, weakness, bleeding. Severe pain or pain not relieved by medications. Or, any other signs or symptoms that you may have questions about.       DISPOSITION:    Home With:   OT  PT  HH  RN       SNF/Inpatient Rehab/LTAC    Independent/assisted living    Hospice    Other:         PROBLEM LIST
I attest my time as FREDI is greater than 50% of the total combined time spent on qualifying patient care activities. I have reviewed and verified the documentation.

## 2024-01-01 NOTE — DISCHARGE NOTE NURSING/CASE MANAGEMENT/SOCIAL WORK - NSDCPEFALRISK_GEN_ALL_CORE
Mom called stating patient has had a cough and fever and would like her to be swabbed for strep, covid and flu.    Promise can you call mom and help schedule an sick visit appointment.    For information on Fall & Injury Prevention, visit: [email or web address] OR  [email or web address] OR  https://www.cdc.gov/steadi/patient.html

## 2024-04-26 ENCOUNTER — NON-APPOINTMENT (OUTPATIENT)
Age: 54
End: 2024-04-26

## 2024-05-07 NOTE — H&P ADULT - PROBLEM SELECTOR PLAN 6
send to Dr. Torres requesting more info   Yes to Pt eval and treat for transfers   Currently in SR   ON ASA Ec 81mg daily

## 2025-01-07 NOTE — PATIENT PROFILE ADULT - INTERNATIONAL TRAVEL
Pt has a appt on 1/15/25 pt has medicare his last psa was done 10/16/24 order by [email or web address] order psa on 10/22/24 does pt need to have this done for his appt.please advise.   No